# Patient Record
Sex: MALE | Race: WHITE | NOT HISPANIC OR LATINO | Employment: OTHER | URBAN - METROPOLITAN AREA
[De-identification: names, ages, dates, MRNs, and addresses within clinical notes are randomized per-mention and may not be internally consistent; named-entity substitution may affect disease eponyms.]

---

## 2023-04-22 ENCOUNTER — APPOINTMENT (OUTPATIENT)
Dept: CT IMAGING | Facility: CLINIC | Age: 73
DRG: 713 | End: 2023-04-22
Attending: EMERGENCY MEDICINE
Payer: MEDICARE

## 2023-04-22 ENCOUNTER — HOSPITAL ENCOUNTER (INPATIENT)
Facility: CLINIC | Age: 73
LOS: 6 days | Discharge: HOME | DRG: 713 | End: 2023-04-28
Attending: EMERGENCY MEDICINE | Admitting: STUDENT IN AN ORGANIZED HEALTH CARE EDUCATION/TRAINING PROGRAM
Payer: MEDICARE

## 2023-04-22 DIAGNOSIS — K59.00 CONSTIPATION, UNSPECIFIED CONSTIPATION TYPE: ICD-10-CM

## 2023-04-22 DIAGNOSIS — N39.0 COMPLICATED UTI (URINARY TRACT INFECTION): ICD-10-CM

## 2023-04-22 DIAGNOSIS — N10 PYELONEPHRITIS, ACUTE: ICD-10-CM

## 2023-04-22 DIAGNOSIS — R33.9 URINARY RETENTION: ICD-10-CM

## 2023-04-22 DIAGNOSIS — C61 MALIGNANT NEOPLASM OF PROSTATE (H): ICD-10-CM

## 2023-04-22 DIAGNOSIS — C61 PROSTATE CANCER (H): Primary | ICD-10-CM

## 2023-04-22 DIAGNOSIS — N17.9 AKI (ACUTE KIDNEY INJURY) (H): ICD-10-CM

## 2023-04-22 LAB
ALBUMIN UR-MCNC: ABNORMAL MG/DL
ANION GAP SERPL CALCULATED.3IONS-SCNC: 11 MMOL/L (ref 7–15)
APPEARANCE UR: ABNORMAL
BASOPHILS # BLD AUTO: 0 10E3/UL (ref 0–0.2)
BASOPHILS NFR BLD AUTO: 0 %
BILIRUB UR QL STRIP: NEGATIVE
BUN SERPL-MCNC: 25.9 MG/DL (ref 8–23)
CALCIUM SERPL-MCNC: 9.6 MG/DL (ref 8.8–10.2)
CHLORIDE SERPL-SCNC: 100 MMOL/L (ref 98–107)
COLOR UR AUTO: YELLOW
CREAT SERPL-MCNC: 2.03 MG/DL (ref 0.67–1.17)
DEPRECATED HCO3 PLAS-SCNC: 27 MMOL/L (ref 22–29)
EOSINOPHIL # BLD AUTO: 0 10E3/UL (ref 0–0.7)
EOSINOPHIL NFR BLD AUTO: 0 %
ERYTHROCYTE [DISTWIDTH] IN BLOOD BY AUTOMATED COUNT: 12 % (ref 10–15)
GFR SERPL CREATININE-BSD FRML MDRD: 34 ML/MIN/1.73M2
GLUCOSE SERPL-MCNC: 121 MG/DL (ref 70–99)
GLUCOSE UR STRIP-MCNC: NEGATIVE MG/DL
GRANULAR CAST: 6 /LPF
HCO3 BLDV-SCNC: 28 MMOL/L (ref 21–28)
HCT VFR BLD AUTO: 41 % (ref 40–53)
HGB BLD-MCNC: 14.1 G/DL (ref 13.3–17.7)
HGB UR QL STRIP: ABNORMAL
IMM GRANULOCYTES # BLD: 0.1 10E3/UL
IMM GRANULOCYTES NFR BLD: 0 %
KETONES UR STRIP-MCNC: NEGATIVE MG/DL
LACTATE BLD-SCNC: 2 MMOL/L
LEUKOCYTE ESTERASE UR QL STRIP: ABNORMAL
LYMPHOCYTES # BLD AUTO: 1.7 10E3/UL (ref 0.8–5.3)
LYMPHOCYTES NFR BLD AUTO: 9 %
MCH RBC QN AUTO: 31.1 PG (ref 26.5–33)
MCHC RBC AUTO-ENTMCNC: 34.4 G/DL (ref 31.5–36.5)
MCV RBC AUTO: 90 FL (ref 78–100)
MONOCYTES # BLD AUTO: 1.8 10E3/UL (ref 0–1.3)
MONOCYTES NFR BLD AUTO: 9 %
MUCOUS THREADS #/AREA URNS LPF: PRESENT /LPF
NEUTROPHILS # BLD AUTO: 15.4 10E3/UL (ref 1.6–8.3)
NEUTROPHILS NFR BLD AUTO: 82 %
NITRATE UR QL: NEGATIVE
NRBC # BLD AUTO: 0 10E3/UL
NRBC BLD AUTO-RTO: 0 /100
PCO2 BLDV: 50 MM HG (ref 40–50)
PH BLDV: 7.36 [PH] (ref 7.32–7.43)
PH UR STRIP: 6 [PH] (ref 5–7)
PLATELET # BLD AUTO: 286 10E3/UL (ref 150–450)
PO2 BLDV: 26 MM HG (ref 25–47)
POTASSIUM SERPL-SCNC: 4 MMOL/L (ref 3.4–5.3)
RBC # BLD AUTO: 4.54 10E6/UL (ref 4.4–5.9)
RBC URINE: >182 /HPF
SAO2 % BLDV: 44 % (ref 94–100)
SODIUM SERPL-SCNC: 138 MMOL/L (ref 136–145)
SP GR UR STRIP: 1.01 (ref 1–1.03)
UROBILINOGEN UR STRIP-MCNC: NORMAL MG/DL
WBC # BLD AUTO: 19 10E3/UL (ref 4–11)
WBC CLUMPS #/AREA URNS HPF: PRESENT /HPF
WBC URINE: 77 /HPF

## 2023-04-22 PROCEDURE — 36415 COLL VENOUS BLD VENIPUNCTURE: CPT | Performed by: EMERGENCY MEDICINE

## 2023-04-22 PROCEDURE — 250N000011 HC RX IP 250 OP 636: Performed by: EMERGENCY MEDICINE

## 2023-04-22 PROCEDURE — 85025 COMPLETE CBC W/AUTO DIFF WBC: CPT | Performed by: EMERGENCY MEDICINE

## 2023-04-22 PROCEDURE — 83605 ASSAY OF LACTIC ACID: CPT

## 2023-04-22 PROCEDURE — 120N000001 HC R&B MED SURG/OB

## 2023-04-22 PROCEDURE — 87086 URINE CULTURE/COLONY COUNT: CPT | Performed by: EMERGENCY MEDICINE

## 2023-04-22 PROCEDURE — 81001 URINALYSIS AUTO W/SCOPE: CPT | Performed by: EMERGENCY MEDICINE

## 2023-04-22 PROCEDURE — 82803 BLOOD GASES ANY COMBINATION: CPT

## 2023-04-22 PROCEDURE — 74176 CT ABD & PELVIS W/O CONTRAST: CPT | Mod: MA

## 2023-04-22 PROCEDURE — 258N000003 HC RX IP 258 OP 636: Performed by: STUDENT IN AN ORGANIZED HEALTH CARE EDUCATION/TRAINING PROGRAM

## 2023-04-22 PROCEDURE — 250N000013 HC RX MED GY IP 250 OP 250 PS 637: Performed by: STUDENT IN AN ORGANIZED HEALTH CARE EDUCATION/TRAINING PROGRAM

## 2023-04-22 PROCEDURE — 80048 BASIC METABOLIC PNL TOTAL CA: CPT | Performed by: EMERGENCY MEDICINE

## 2023-04-22 PROCEDURE — 99285 EMERGENCY DEPT VISIT HI MDM: CPT | Mod: 25

## 2023-04-22 PROCEDURE — 96374 THER/PROPH/DIAG INJ IV PUSH: CPT

## 2023-04-22 PROCEDURE — 99222 1ST HOSP IP/OBS MODERATE 55: CPT | Mod: AI | Performed by: STUDENT IN AN ORGANIZED HEALTH CARE EDUCATION/TRAINING PROGRAM

## 2023-04-22 RX ORDER — ONDANSETRON 2 MG/ML
4 INJECTION INTRAMUSCULAR; INTRAVENOUS EVERY 6 HOURS PRN
Status: DISCONTINUED | OUTPATIENT
Start: 2023-04-22 | End: 2023-04-23

## 2023-04-22 RX ORDER — CEFTRIAXONE 2 G/1
2 INJECTION, POWDER, FOR SOLUTION INTRAMUSCULAR; INTRAVENOUS EVERY 24 HOURS
Status: DISCONTINUED | OUTPATIENT
Start: 2023-04-23 | End: 2023-04-28

## 2023-04-22 RX ORDER — CEFTRIAXONE 1 G/1
1 INJECTION, POWDER, FOR SOLUTION INTRAMUSCULAR; INTRAVENOUS ONCE
Status: COMPLETED | OUTPATIENT
Start: 2023-04-22 | End: 2023-04-22

## 2023-04-22 RX ORDER — LIDOCAINE 40 MG/G
CREAM TOPICAL
Status: DISCONTINUED | OUTPATIENT
Start: 2023-04-22 | End: 2023-04-23

## 2023-04-22 RX ORDER — ONDANSETRON 4 MG/1
4 TABLET, ORALLY DISINTEGRATING ORAL EVERY 6 HOURS PRN
Status: DISCONTINUED | OUTPATIENT
Start: 2023-04-22 | End: 2023-04-23

## 2023-04-22 RX ORDER — TAMSULOSIN HYDROCHLORIDE 0.4 MG/1
0.4 CAPSULE ORAL DAILY
Status: DISCONTINUED | OUTPATIENT
Start: 2023-04-22 | End: 2023-04-28 | Stop reason: HOSPADM

## 2023-04-22 RX ORDER — ACETAMINOPHEN 650 MG/1
650 SUPPOSITORY RECTAL EVERY 6 HOURS PRN
Status: DISCONTINUED | OUTPATIENT
Start: 2023-04-22 | End: 2023-04-28 | Stop reason: HOSPADM

## 2023-04-22 RX ORDER — SODIUM CHLORIDE 9 MG/ML
INJECTION, SOLUTION INTRAVENOUS CONTINUOUS
Status: DISCONTINUED | OUTPATIENT
Start: 2023-04-22 | End: 2023-04-23

## 2023-04-22 RX ORDER — ACETAMINOPHEN 325 MG/1
650 TABLET ORAL EVERY 6 HOURS PRN
Status: DISCONTINUED | OUTPATIENT
Start: 2023-04-22 | End: 2023-04-28 | Stop reason: HOSPADM

## 2023-04-22 RX ADMIN — Medication 1 MG: at 21:50

## 2023-04-22 RX ADMIN — CEFTRIAXONE SODIUM 1 G: 1 INJECTION, POWDER, FOR SOLUTION INTRAMUSCULAR; INTRAVENOUS at 17:35

## 2023-04-22 RX ADMIN — ACETAMINOPHEN 650 MG: 325 TABLET ORAL at 21:47

## 2023-04-22 RX ADMIN — TAMSULOSIN HYDROCHLORIDE 0.4 MG: 0.4 CAPSULE ORAL at 23:04

## 2023-04-22 RX ADMIN — SODIUM CHLORIDE: 9 INJECTION, SOLUTION INTRAVENOUS at 21:43

## 2023-04-22 ASSESSMENT — ACTIVITIES OF DAILY LIVING (ADL)
ADLS_ACUITY_SCORE: 35
ADLS_ACUITY_SCORE: 35
ADLS_ACUITY_SCORE: 33
ADLS_ACUITY_SCORE: 35

## 2023-04-22 NOTE — PHARMACY-ADMISSION MEDICATION HISTORY
Pharmacist Admission Medication History    Admission medication history is complete. The information provided in this note is only as accurate as the sources available at the time of the update.    Medication reconciliation/reorder completed by provider prior to medication history? No    Information Source(s): Patient via in-person    Allergies reviewed with patient and updates made in EHR: yes    Medication History Completed By: Sona Carvalho 4/22/2023 5:31 PM    Prior to Admission medications    Medication Sig Last Dose Taking? Auth Provider Long Term End Date   NONFORMULARY Patient takes a mens herbal supplement daily which includes saw palmetto. Past Week Yes Unknown, Entered By History

## 2023-04-22 NOTE — ED NOTES
Patient still unable to provide urine sample. He is requesting catheter placement and follow up with Urology. MD notified.

## 2023-04-22 NOTE — ED PROVIDER NOTES
"  History     Chief Complaint:  Dysuria       The history is provided by the patient.      Don Venegas is a 72 year old male who presents with dysuria. Patient reports 2 days he ago he started to experience dysuria and urination frequency. He reports mild abdominal tenderness. Patient denies hematuria, fever, chills.  He does describe bilateral lower back pain.  Patient denies any past history of bladder infection.    Independent Historian:   None - Patient Only    Review of External Notes: none     ROS:  Review of Systems    Allergies:  No Known Allergies     Medications:    The patient is not currently taking any prescribed medications.     Past Medical History:    The patient denies any significant past medical history.     Social History:  The patient presents alone.  Arrived by private vehicle.         Physical Exam     Patient Vitals for the past 24 hrs:   BP Temp Temp src Pulse Resp SpO2 Height Weight   04/22/23 1213 (!) 160/64 97.1  F (36.2  C) Temporal 78 18 97 % 1.778 m (5' 10\") 68 kg (150 lb)        Physical Exam     Nursing note and vitals reviewed.    Constitutional:  Appears comfortable.    HENT:                    Nose normal.  No discharge.      Oral mucosa is moist.    GI:    Soft. mild suprapubic tenderness. No    guarding     No flank pain.    Neurological:   Alert and oriented. No focal weakness.  Skin:    Skin is warm and dry. No rash noted.    No diaphoresis.      No erythema. No pallor.  No lesions.  Psychiatric:   Behavior is normal. Appropriate mood    and affect.     Judgment and thought content normal.     Emergency Department Course       Laboratory:  Labs Ordered and Resulted from Time of ED Arrival to Time of ED Departure   ROUTINE UA WITH MICROSCOPIC REFLEX TO CULTURE - Abnormal       Result Value    Color Urine Yellow      Appearance Urine Cloudy (*)     Glucose Urine Negative      Bilirubin Urine Negative      Ketones Urine Negative      Specific Gravity Urine 1.015      Blood " Urine Large (*)     pH Urine 6.0      Protein Albumin Urine Trace (*)     Urobilinogen Urine Normal      Nitrite Urine Negative      Leukocyte Esterase Urine Trace (*)     WBC Clumps Urine Present (*)     Mucus Urine Present (*)     RBC Urine >182 (*)     WBC Urine 77 (*)     Granular Casts Urine 6 (*)    BASIC METABOLIC PANEL - Abnormal    Sodium 138      Potassium 4.0      Chloride 100      Carbon Dioxide (CO2) 27      Anion Gap 11      Urea Nitrogen 25.9 (*)     Creatinine 2.03 (*)     Calcium 9.6      Glucose 121 (*)     GFR Estimate 34 (*)    CBC WITH PLATELETS AND DIFFERENTIAL - Abnormal    WBC Count 19.0 (*)     RBC Count 4.54      Hemoglobin 14.1      Hematocrit 41.0      MCV 90      MCH 31.1      MCHC 34.4      RDW 12.0      Platelet Count 286      % Neutrophils 82      % Lymphocytes 9      % Monocytes 9      % Eosinophils 0      % Basophils 0      % Immature Granulocytes 0      NRBCs per 100 WBC 0      Absolute Neutrophils 15.4 (*)     Absolute Lymphocytes 1.7      Absolute Monocytes 1.8 (*)     Absolute Eosinophils 0.0      Absolute Basophils 0.0      Absolute Immature Granulocytes 0.1      Absolute NRBCs 0.0     LACTIC ACID WHOLE BLOOD   URINE CULTURE          Emergency Department Course & Assessments:           Interventions:  Medications   cefTRIAXone (ROCEPHIN) 1 g vial to attach to  mL bag for ADULTS or NS 50 mL bag for PEDS (1 g Intravenous $New Bag 4/22/23 173)        Assessments:  1224 I obtained history and examined the patient as noted above.   1649 I rechecked and updated the patient.     Independent Interpretation (X-rays, CTs, rhythm strip):  None    Consultations/Discussion of Management or Tests: I spoke with Dr. Loredo, Hospitalist, regarding the patient's history and presentation in the emergency department today.           Social Determinants of Health affecting care:   None    Disposition:  The patient was admitted to the hospital under the care of Dr. Loredo.     Impression & Plan       Medical Decision Making:  Patient comes in with dysuria.  He could not urinate while in the emergency room.  A bladder scan was obtained he had over 1000 cc in this we placed a May catheter and relieved his obstruction and a large volume of urine came back and it is infected.  His white blood cell count is 19,000, urine is cultured, he has acute renal failure with a creatinine of 2.03 and GFR of 34 but normal electrolytes.  He has no history of renal failure.  He does not usually go to the doctor.  Rocephin is ordered 1 g IV and he will continue to be drained with the May catheter.  I talked with Dr. Loredo who will be the admitting hospitalist and he recommended a CT scan without contrast and the patient will be admitted for further antibiotics and urology consultation.  Dr. Loredo will follow-up the CT report.  .    Diagnosis:    ICD-10-CM    1. Pyelonephritis, acute  N10       2. SHANON (acute kidney injury) (H)  N17.9       3. Urinary retention  R33.9            Scribe Disclosure:  MARISELA LANGSTON PRINCESS, am serving as a scribe at 12:45 PM on 4/22/2023 to document services personally performed by Micaela Sotelo MD based on my observations and the provider's statements to me.      4/22/2023   Micaela Sotelo MD Powell, Tracy Alan, MD  04/22/23 2750

## 2023-04-22 NOTE — ED TRIAGE NOTES
Painful urination started on Thursday. Frequency started on Friday. Last evening pain worsened in penis area.      Triage Assessment     Row Name 04/22/23 1214       Triage Assessment (Adult)    Airway WDL WDL       Respiratory WDL    Respiratory WDL WDL       Skin Circulation/Temperature WDL    Skin Circulation/Temperature WDL WDL       Cardiac WDL    Cardiac WDL WDL       Peripheral/Neurovascular WDL    Peripheral Neurovascular WDL WDL       Cognitive/Neuro/Behavioral WDL    Cognitive/Neuro/Behavioral WDL WDL

## 2023-04-22 NOTE — ED NOTES
"[facility]  ED Nurse Handoff Report    ED Chief complaint: Dysuria      ED Diagnosis:   Final diagnoses:   Pyelonephritis, acute   SHANON (acute kidney injury) (H)   Urinary retention       Code Status: Full Code    Allergies:   Allergies   Allergen Reactions    Gluten Meal        Patient Story: PT comes in through triage with dysuria since Thursday.  Focused Assessment: A/Ox4 can make needs known. RA sats 98%. May placed for retension, return of 1000 ml. Denies pain at this time.     Treatments and/or interventions provided: Iv, labs, CT pending, IV abx.  Patient's response to treatments and/or interventions: Tolerated well.    To be done/followed up on inpatient unit:  Monitor.    Does this patient have any cognitive concerns?: None    Activity level - Baseline/Home:  Independent  Activity Level - Current:   Independent    Patient's Preferred language: English   Needed?: No    Isolation: None  Infection: Not Applicable  Patient tested for COVID 19 prior to admission: NO  Bariatric?: No    Vital Signs:   Vitals:    04/22/23 1213 04/22/23 1722   BP: (!) 160/64 (!) 157/77   Pulse: 78 74   Resp: 18    Temp: 97.1  F (36.2  C)    TempSrc: Temporal    SpO2: 97% 100%   Weight: 68 kg (150 lb)    Height: 1.778 m (5' 10\")        Cardiac Rhythm:     Was the PSS-3 completed:   Yes  What interventions are required if any?               Family Comments: None  OBS brochure/video discussed/provided to patient/family: N/A              Name of person given brochure if not patient: NA              Relationship to patient: NA    For the majority of the shift this patient's behavior was Green.   Behavioral interventions performed were None.    ED NURSE PHONE NUMBER: [phone]         "

## 2023-04-23 ENCOUNTER — ANESTHESIA EVENT (OUTPATIENT)
Dept: SURGERY | Facility: CLINIC | Age: 73
DRG: 713 | End: 2023-04-23
Payer: MEDICARE

## 2023-04-23 ENCOUNTER — ANESTHESIA (OUTPATIENT)
Dept: SURGERY | Facility: CLINIC | Age: 73
DRG: 713 | End: 2023-04-23
Payer: MEDICARE

## 2023-04-23 LAB
ALBUMIN SERPL BCG-MCNC: 3.4 G/DL (ref 3.5–5.2)
ALP SERPL-CCNC: 90 U/L (ref 40–129)
ALT SERPL W P-5'-P-CCNC: 14 U/L (ref 10–50)
ANION GAP SERPL CALCULATED.3IONS-SCNC: 10 MMOL/L (ref 7–15)
ANION GAP SERPL CALCULATED.3IONS-SCNC: 14 MMOL/L (ref 7–15)
AST SERPL W P-5'-P-CCNC: 28 U/L (ref 10–50)
BILIRUB SERPL-MCNC: 1 MG/DL
BUN SERPL-MCNC: 24 MG/DL (ref 8–23)
BUN SERPL-MCNC: 24.2 MG/DL (ref 8–23)
CALCIUM SERPL-MCNC: 8.5 MG/DL (ref 8.8–10.2)
CALCIUM SERPL-MCNC: 8.6 MG/DL (ref 8.8–10.2)
CHLORIDE SERPL-SCNC: 105 MMOL/L (ref 98–107)
CHLORIDE SERPL-SCNC: 105 MMOL/L (ref 98–107)
CREAT SERPL-MCNC: 1.46 MG/DL (ref 0.67–1.17)
CREAT SERPL-MCNC: 1.51 MG/DL (ref 0.67–1.17)
DEPRECATED HCO3 PLAS-SCNC: 21 MMOL/L (ref 22–29)
DEPRECATED HCO3 PLAS-SCNC: 24 MMOL/L (ref 22–29)
ERYTHROCYTE [DISTWIDTH] IN BLOOD BY AUTOMATED COUNT: 12 % (ref 10–15)
GFR SERPL CREATININE-BSD FRML MDRD: 49 ML/MIN/1.73M2
GFR SERPL CREATININE-BSD FRML MDRD: 51 ML/MIN/1.73M2
GLUCOSE SERPL-MCNC: 107 MG/DL (ref 70–99)
GLUCOSE SERPL-MCNC: 115 MG/DL (ref 70–99)
HCT VFR BLD AUTO: 36.3 % (ref 40–53)
HGB BLD-MCNC: 12.7 G/DL (ref 13.3–17.7)
INR PPP: 1.18 (ref 0.85–1.15)
MCH RBC QN AUTO: 31.7 PG (ref 26.5–33)
MCHC RBC AUTO-ENTMCNC: 35 G/DL (ref 31.5–36.5)
MCV RBC AUTO: 91 FL (ref 78–100)
PLATELET # BLD AUTO: 228 10E3/UL (ref 150–450)
POTASSIUM SERPL-SCNC: 3.7 MMOL/L (ref 3.4–5.3)
POTASSIUM SERPL-SCNC: 3.9 MMOL/L (ref 3.4–5.3)
PROT SERPL-MCNC: 5.7 G/DL (ref 6.4–8.3)
RBC # BLD AUTO: 4.01 10E6/UL (ref 4.4–5.9)
SODIUM SERPL-SCNC: 139 MMOL/L (ref 136–145)
SODIUM SERPL-SCNC: 140 MMOL/L (ref 136–145)
WBC # BLD AUTO: 14.6 10E3/UL (ref 4–11)

## 2023-04-23 PROCEDURE — 36415 COLL VENOUS BLD VENIPUNCTURE: CPT | Performed by: UROLOGY

## 2023-04-23 PROCEDURE — 250N000011 HC RX IP 250 OP 636: Performed by: UROLOGY

## 2023-04-23 PROCEDURE — 250N000009 HC RX 250: Performed by: UROLOGY

## 2023-04-23 PROCEDURE — 99232 SBSQ HOSP IP/OBS MODERATE 35: CPT | Performed by: HOSPITALIST

## 2023-04-23 PROCEDURE — 272N000001 HC OR GENERAL SUPPLY STERILE: Performed by: UROLOGY

## 2023-04-23 PROCEDURE — 250N000013 HC RX MED GY IP 250 OP 250 PS 637: Performed by: STUDENT IN AN ORGANIZED HEALTH CARE EDUCATION/TRAINING PROGRAM

## 2023-04-23 PROCEDURE — 80053 COMPREHEN METABOLIC PANEL: CPT | Performed by: STUDENT IN AN ORGANIZED HEALTH CARE EDUCATION/TRAINING PROGRAM

## 2023-04-23 PROCEDURE — 710N000009 HC RECOVERY PHASE 1, LEVEL 1, PER MIN: Performed by: UROLOGY

## 2023-04-23 PROCEDURE — 88307 TISSUE EXAM BY PATHOLOGIST: CPT | Mod: TC | Performed by: UROLOGY

## 2023-04-23 PROCEDURE — 85610 PROTHROMBIN TIME: CPT | Performed by: UROLOGY

## 2023-04-23 PROCEDURE — 370N000017 HC ANESTHESIA TECHNICAL FEE, PER MIN: Performed by: UROLOGY

## 2023-04-23 PROCEDURE — 258N000001 HC RX 258: Performed by: UROLOGY

## 2023-04-23 PROCEDURE — 250N000013 HC RX MED GY IP 250 OP 250 PS 637: Performed by: UROLOGY

## 2023-04-23 PROCEDURE — 360N000076 HC SURGERY LEVEL 3, PER MIN: Performed by: UROLOGY

## 2023-04-23 PROCEDURE — 258N000003 HC RX IP 258 OP 636: Performed by: UROLOGY

## 2023-04-23 PROCEDURE — 0VB08ZZ EXCISION OF PROSTATE, VIA NATURAL OR ARTIFICIAL OPENING ENDOSCOPIC: ICD-10-PCS | Performed by: UROLOGY

## 2023-04-23 PROCEDURE — 85027 COMPLETE CBC AUTOMATED: CPT | Performed by: STUDENT IN AN ORGANIZED HEALTH CARE EDUCATION/TRAINING PROGRAM

## 2023-04-23 PROCEDURE — 250N000013 HC RX MED GY IP 250 OP 250 PS 637: Performed by: HOSPITALIST

## 2023-04-23 PROCEDURE — 250N000009 HC RX 250: Performed by: NURSE ANESTHETIST, CERTIFIED REGISTERED

## 2023-04-23 PROCEDURE — 250N000011 HC RX IP 250 OP 636: Performed by: STUDENT IN AN ORGANIZED HEALTH CARE EDUCATION/TRAINING PROGRAM

## 2023-04-23 PROCEDURE — 999N000141 HC STATISTIC PRE-PROCEDURE NURSING ASSESSMENT: Performed by: UROLOGY

## 2023-04-23 PROCEDURE — 258N000003 HC RX IP 258 OP 636: Performed by: NURSE ANESTHETIST, CERTIFIED REGISTERED

## 2023-04-23 PROCEDURE — 250N000011 HC RX IP 250 OP 636: Performed by: NURSE ANESTHETIST, CERTIFIED REGISTERED

## 2023-04-23 PROCEDURE — 250N000025 HC SEVOFLURANE, PER MIN: Performed by: UROLOGY

## 2023-04-23 PROCEDURE — C1769 GUIDE WIRE: HCPCS | Performed by: UROLOGY

## 2023-04-23 PROCEDURE — 120N000001 HC R&B MED SURG/OB

## 2023-04-23 PROCEDURE — 36415 COLL VENOUS BLD VENIPUNCTURE: CPT | Performed by: STUDENT IN AN ORGANIZED HEALTH CARE EDUCATION/TRAINING PROGRAM

## 2023-04-23 PROCEDURE — 258N000003 HC RX IP 258 OP 636: Performed by: STUDENT IN AN ORGANIZED HEALTH CARE EDUCATION/TRAINING PROGRAM

## 2023-04-23 RX ORDER — SODIUM CHLORIDE, SODIUM LACTATE, POTASSIUM CHLORIDE, CALCIUM CHLORIDE 600; 310; 30; 20 MG/100ML; MG/100ML; MG/100ML; MG/100ML
INJECTION, SOLUTION INTRAVENOUS CONTINUOUS
Status: DISCONTINUED | OUTPATIENT
Start: 2023-04-23 | End: 2023-04-23 | Stop reason: HOSPADM

## 2023-04-23 RX ORDER — FENTANYL CITRATE 50 UG/ML
25 INJECTION, SOLUTION INTRAMUSCULAR; INTRAVENOUS EVERY 5 MIN PRN
Status: DISCONTINUED | OUTPATIENT
Start: 2023-04-23 | End: 2023-04-23 | Stop reason: HOSPADM

## 2023-04-23 RX ORDER — SODIUM CHLORIDE, SODIUM LACTATE, POTASSIUM CHLORIDE, CALCIUM CHLORIDE 600; 310; 30; 20 MG/100ML; MG/100ML; MG/100ML; MG/100ML
INJECTION, SOLUTION INTRAVENOUS CONTINUOUS PRN
Status: DISCONTINUED | OUTPATIENT
Start: 2023-04-23 | End: 2023-04-23

## 2023-04-23 RX ORDER — FENTANYL CITRATE 50 UG/ML
INJECTION, SOLUTION INTRAMUSCULAR; INTRAVENOUS PRN
Status: DISCONTINUED | OUTPATIENT
Start: 2023-04-23 | End: 2023-04-23

## 2023-04-23 RX ORDER — SODIUM CHLORIDE 9 MG/ML
INJECTION, SOLUTION INTRAVENOUS CONTINUOUS
Status: DISCONTINUED | OUTPATIENT
Start: 2023-04-23 | End: 2023-04-28 | Stop reason: HOSPADM

## 2023-04-23 RX ORDER — DEXAMETHASONE SODIUM PHOSPHATE 4 MG/ML
INJECTION, SOLUTION INTRA-ARTICULAR; INTRALESIONAL; INTRAMUSCULAR; INTRAVENOUS; SOFT TISSUE PRN
Status: DISCONTINUED | OUTPATIENT
Start: 2023-04-23 | End: 2023-04-23

## 2023-04-23 RX ORDER — NALOXONE HYDROCHLORIDE 0.4 MG/ML
0.2 INJECTION, SOLUTION INTRAMUSCULAR; INTRAVENOUS; SUBCUTANEOUS
Status: DISCONTINUED | OUTPATIENT
Start: 2023-04-23 | End: 2023-04-28 | Stop reason: HOSPADM

## 2023-04-23 RX ORDER — LIDOCAINE 40 MG/G
CREAM TOPICAL
Status: DISCONTINUED | OUTPATIENT
Start: 2023-04-23 | End: 2023-04-28 | Stop reason: HOSPADM

## 2023-04-23 RX ORDER — ONDANSETRON 4 MG/1
4 TABLET, ORALLY DISINTEGRATING ORAL EVERY 30 MIN PRN
Status: DISCONTINUED | OUTPATIENT
Start: 2023-04-23 | End: 2023-04-23 | Stop reason: HOSPADM

## 2023-04-23 RX ORDER — OXYCODONE HYDROCHLORIDE 5 MG/1
5 TABLET ORAL EVERY 4 HOURS PRN
Status: DISCONTINUED | OUTPATIENT
Start: 2023-04-23 | End: 2023-04-23

## 2023-04-23 RX ORDER — HYDROMORPHONE HCL IN WATER/PF 6 MG/30 ML
0.4 PATIENT CONTROLLED ANALGESIA SYRINGE INTRAVENOUS EVERY 5 MIN PRN
Status: DISCONTINUED | OUTPATIENT
Start: 2023-04-23 | End: 2023-04-23 | Stop reason: HOSPADM

## 2023-04-23 RX ORDER — CEFAZOLIN SODIUM 1 G/3ML
1 INJECTION, POWDER, FOR SOLUTION INTRAMUSCULAR; INTRAVENOUS EVERY 8 HOURS
Status: DISCONTINUED | OUTPATIENT
Start: 2023-04-23 | End: 2023-04-23

## 2023-04-23 RX ORDER — OXYCODONE HYDROCHLORIDE 5 MG/1
5 TABLET ORAL EVERY 4 HOURS PRN
Status: DISCONTINUED | OUTPATIENT
Start: 2023-04-23 | End: 2023-04-28 | Stop reason: HOSPADM

## 2023-04-23 RX ORDER — ACETAMINOPHEN 325 MG/1
650 TABLET ORAL EVERY 4 HOURS PRN
Qty: 100 TABLET | Refills: 0 | Status: SHIPPED | OUTPATIENT
Start: 2023-04-23 | End: 2023-04-28

## 2023-04-23 RX ORDER — PROPOFOL 10 MG/ML
INJECTION, EMULSION INTRAVENOUS PRN
Status: DISCONTINUED | OUTPATIENT
Start: 2023-04-23 | End: 2023-04-23

## 2023-04-23 RX ORDER — ONDANSETRON 2 MG/ML
4 INJECTION INTRAMUSCULAR; INTRAVENOUS EVERY 6 HOURS PRN
Status: DISCONTINUED | OUTPATIENT
Start: 2023-04-23 | End: 2023-04-28 | Stop reason: HOSPADM

## 2023-04-23 RX ORDER — ONDANSETRON 4 MG/1
4-8 TABLET, ORALLY DISINTEGRATING ORAL EVERY 8 HOURS PRN
Qty: 10 TABLET | Refills: 0 | Status: SHIPPED | OUTPATIENT
Start: 2023-04-23 | End: 2023-04-28

## 2023-04-23 RX ORDER — HYDROMORPHONE HCL IN WATER/PF 6 MG/30 ML
0.2 PATIENT CONTROLLED ANALGESIA SYRINGE INTRAVENOUS EVERY 5 MIN PRN
Status: DISCONTINUED | OUTPATIENT
Start: 2023-04-23 | End: 2023-04-23 | Stop reason: HOSPADM

## 2023-04-23 RX ORDER — LIDOCAINE HYDROCHLORIDE 20 MG/ML
INJECTION, SOLUTION INFILTRATION; PERINEURAL PRN
Status: DISCONTINUED | OUTPATIENT
Start: 2023-04-23 | End: 2023-04-23

## 2023-04-23 RX ORDER — ONDANSETRON 2 MG/ML
4 INJECTION INTRAMUSCULAR; INTRAVENOUS EVERY 30 MIN PRN
Status: DISCONTINUED | OUTPATIENT
Start: 2023-04-23 | End: 2023-04-23 | Stop reason: HOSPADM

## 2023-04-23 RX ORDER — FENTANYL CITRATE 50 UG/ML
50 INJECTION, SOLUTION INTRAMUSCULAR; INTRAVENOUS EVERY 5 MIN PRN
Status: DISCONTINUED | OUTPATIENT
Start: 2023-04-23 | End: 2023-04-23 | Stop reason: HOSPADM

## 2023-04-23 RX ORDER — LIDOCAINE 40 MG/G
CREAM TOPICAL
Status: DISCONTINUED | OUTPATIENT
Start: 2023-04-23 | End: 2023-04-23

## 2023-04-23 RX ORDER — HYDROXYZINE HYDROCHLORIDE 10 MG/1
10 TABLET, FILM COATED ORAL EVERY 6 HOURS PRN
Qty: 30 TABLET | Refills: 0 | Status: SHIPPED | OUTPATIENT
Start: 2023-04-23 | End: 2023-04-28

## 2023-04-23 RX ORDER — ONDANSETRON 4 MG/1
4 TABLET, ORALLY DISINTEGRATING ORAL EVERY 6 HOURS PRN
Status: DISCONTINUED | OUTPATIENT
Start: 2023-04-23 | End: 2023-04-28 | Stop reason: HOSPADM

## 2023-04-23 RX ORDER — CEFAZOLIN SODIUM 2 G/100ML
2 INJECTION, SOLUTION INTRAVENOUS
Status: COMPLETED | OUTPATIENT
Start: 2023-04-23 | End: 2023-04-23

## 2023-04-23 RX ORDER — NALOXONE HYDROCHLORIDE 0.4 MG/ML
0.4 INJECTION, SOLUTION INTRAMUSCULAR; INTRAVENOUS; SUBCUTANEOUS
Status: DISCONTINUED | OUTPATIENT
Start: 2023-04-23 | End: 2023-04-28 | Stop reason: HOSPADM

## 2023-04-23 RX ORDER — EPHEDRINE SULFATE 50 MG/ML
INJECTION, SOLUTION INTRAMUSCULAR; INTRAVENOUS; SUBCUTANEOUS PRN
Status: DISCONTINUED | OUTPATIENT
Start: 2023-04-23 | End: 2023-04-23

## 2023-04-23 RX ADMIN — SODIUM CHLORIDE: 9 INJECTION, SOLUTION INTRAVENOUS at 06:44

## 2023-04-23 RX ADMIN — Medication 5 MG: at 17:26

## 2023-04-23 RX ADMIN — LIDOCAINE HYDROCHLORIDE 100 MG: 20 INJECTION, SOLUTION INFILTRATION; PERINEURAL at 17:19

## 2023-04-23 RX ADMIN — FENTANYL CITRATE 50 MCG: 50 INJECTION, SOLUTION INTRAMUSCULAR; INTRAVENOUS at 17:41

## 2023-04-23 RX ADMIN — Medication 10 MG: at 17:19

## 2023-04-23 RX ADMIN — SODIUM CHLORIDE, POTASSIUM CHLORIDE, SODIUM LACTATE AND CALCIUM CHLORIDE: 600; 310; 30; 20 INJECTION, SOLUTION INTRAVENOUS at 17:15

## 2023-04-23 RX ADMIN — PHENYLEPHRINE HYDROCHLORIDE 200 MCG: 10 INJECTION INTRAVENOUS at 17:22

## 2023-04-23 RX ADMIN — HYDROMORPHONE HYDROCHLORIDE 0.5 MG: 1 INJECTION, SOLUTION INTRAMUSCULAR; INTRAVENOUS; SUBCUTANEOUS at 17:50

## 2023-04-23 RX ADMIN — ACETAMINOPHEN 650 MG: 325 TABLET ORAL at 05:56

## 2023-04-23 RX ADMIN — OXYCODONE HYDROCHLORIDE 5 MG: 5 TABLET ORAL at 10:17

## 2023-04-23 RX ADMIN — OXYCODONE HYDROCHLORIDE 5 MG: 5 TABLET ORAL at 14:13

## 2023-04-23 RX ADMIN — DEXAMETHASONE SODIUM PHOSPHATE 4 MG: 4 INJECTION, SOLUTION INTRA-ARTICULAR; INTRALESIONAL; INTRAMUSCULAR; INTRAVENOUS; SOFT TISSUE at 17:39

## 2023-04-23 RX ADMIN — SODIUM CHLORIDE: 9 INJECTION, SOLUTION INTRAVENOUS at 20:08

## 2023-04-23 RX ADMIN — Medication 10 MG: at 17:21

## 2023-04-23 RX ADMIN — PROPOFOL 200 MG: 10 INJECTION, EMULSION INTRAVENOUS at 17:19

## 2023-04-23 RX ADMIN — PHENYLEPHRINE HYDROCHLORIDE 200 MCG: 10 INJECTION INTRAVENOUS at 17:28

## 2023-04-23 RX ADMIN — CEFAZOLIN SODIUM 2 G: 2 INJECTION, SOLUTION INTRAVENOUS at 17:15

## 2023-04-23 RX ADMIN — HYOSCYAMINE SULFATE 125 MCG: 0.12 TABLET SUBLINGUAL at 18:50

## 2023-04-23 RX ADMIN — ONDANSETRON 4 MG: 2 INJECTION INTRAMUSCULAR; INTRAVENOUS at 17:38

## 2023-04-23 RX ADMIN — PHENYLEPHRINE HYDROCHLORIDE 200 MCG: 10 INJECTION INTRAVENOUS at 17:26

## 2023-04-23 RX ADMIN — PHENYLEPHRINE HYDROCHLORIDE 200 MCG: 10 INJECTION INTRAVENOUS at 17:24

## 2023-04-23 RX ADMIN — FENTANYL CITRATE 50 MCG: 50 INJECTION, SOLUTION INTRAMUSCULAR; INTRAVENOUS at 17:19

## 2023-04-23 RX ADMIN — TAMSULOSIN HYDROCHLORIDE 0.4 MG: 0.4 CAPSULE ORAL at 09:19

## 2023-04-23 ASSESSMENT — ACTIVITIES OF DAILY LIVING (ADL)
ADLS_ACUITY_SCORE: 36

## 2023-04-23 NOTE — PLAN OF CARE
Goal Outcome Evaluation:    DX: SHANON, Urinary Retention       Received pt on Unit at 2100. Alert and oriented X4, in no distress. VSS, clear lungs sound bilaterally. Complained of right lower back pain upon arrival, controlled with PRN tylenol X2. Transfer/mobility independent w/SBA. IV NS at 100ml/hr, laughlin in place for urinary retention, patent.  On IV ABX (ROCEPHIN) Q24hr, with initial dose administered in ED. Expected discharge pending 4/24

## 2023-04-23 NOTE — PLAN OF CARE
Goal Outcome Evaluation:      Plan of Care Reviewed With: patient    Overall Patient Progress: no changeOverall Patient Progress: no change     A&O x 4. VSS, RA. CMS intact. Pain managed with oxy. Up SBA. May patent and intact, will be removed in OR. NPO since 7:45 am, OR today.

## 2023-04-23 NOTE — H&P
[facility]    History and Physical - Hospitalist Service       Date of Admission:  4/22/2023    Assessment & Plan      Don Venegas is a 72 year old male admitted on 4/22/2023.         #Pyelonephritis  #Nephrolithiasis  CT scan of the abdomen  Right hydronephrosis/hydroureter with a tiny calcification near the right UVJ and this is favored to represent a small UVJ stone.  2.  Retroperitoneal edema and fluid surrounding right kidney suggests calyceal fornix rupture.  -Consulted urology discussed, with on-call urologist and to continue IV fluids and antibiotics and no surgical intervention planned urology will see patient in the morning  -Continue iv fluids  -Continue May care  -Continue Rocephin  -Follow culture        # SHANON  #Urinary retention    Patient SHANON is most likely due to postrenal.  Has been having symptoms of difficulty voiding since 1 year.  CT scan shows right-sided hydronephrosis and hydroureter  May catheter placed in the ER   Continue IV fluids   Recheck BMP in the morning   Follow urology recommendations        # BPH ?  Will start him on Tamsulosin        Diet: Combination Diet Regular Diet Adult  DVT Prophylaxis: Pneumatic Compression Devices  May Catheter: Not present  Lines: None     Cardiac Monitoring: None  Code Status: Full Code                             Disposition Plan      Expected Discharge Date: 04/24/2023                  Italia Loredo MD  Hospitalist Service  [facility]  Securely message with [employer] (more info)  Text page via [employer] Paging/Directory     ______________________________________________________________________    Chief Complaint       History is obtained from the patient    History of Present Illness   Don Venegas is a 72 year old male who   Patient does not have any significant past medical history.  He has been having difficulty voiding since 1 year.  Since the last 3 to 4 days has been having  flank pain and dysuria.  Denies any fever or chills.  Denies hematuria.  Has been having difficulty with his stream.  Denies any abdominal pain.  Denies any bleeding in the stools.  Denies any shortness of breath or chest pain.      Past Medical History    No past medical history on file.    Past Surgical History   No past surgical history on file.    Prior to Admission Medications   Prior to Admission Medications   Prescriptions Last Dose Informant Patient Reported? Taking?   NONFORMULARY Past Week  Yes Yes   Sig: Patient takes a mens herbal supplement daily which includes saw palmetto.      Facility-Administered Medications: None           Physical Exam   Vital Signs: Temp: 99.3  F (37.4  C) Temp src: Oral BP: (!) 156/74 Pulse: 75   Resp: 16 SpO2: 99 % O2 Device: None (Room air)    Weight: 150 lbs 0 oz  Physical Exam  Cardiovascular:      Rate and Rhythm: Normal rate and regular rhythm.      Heart sounds: Normal heart sounds. No murmur heard.     No gallop.   Pulmonary:      Effort: Pulmonary effort is normal. No respiratory distress.      Breath sounds: No wheezing or rales.   Abdominal:      General: There is no distension.      Palpations: Abdomen is soft.      Tenderness: There is no abdominal tenderness. There is no right CVA tenderness or left CVA tenderness.   Musculoskeletal:      Right lower leg: No edema.      Left lower leg: No edema.       Medical Decision Making       Data     I have personally reviewed the following data over the past 24 hrs:    19.0 (H)  \   14.1   / 286     138 100 25.9 (H) /  121 (H)   4.0 27 2.03 (H) \       Procal: N/A CRP: N/A Lactic Acid: 2.0         Imaging results reviewed over the past 24 hrs:   Recent Results (from the past 24 hour(s))   CT Abdomen Pelvis w/o Contrast    Narrative    EXAM: CT ABDOMEN PELVIS W/O CONTRAST  LOCATION: [facility]  DATE/TIME: 4/22/2023 6:49 PM CDT    INDICATION: Patient has acute urinary retention with acute renal  failure and pyelonephritis.  COMPARISON: None.  TECHNIQUE: CT scan of the abdomen and pelvis was performed without IV contrast. Multiplanar reformats were obtained. Dose reduction techniques were used.  CONTRAST: None.    FINDINGS:   LOWER CHEST: Normal.    HEPATOBILIARY: Normal.    PANCREAS: Normal.    SPLEEN: Normal.    ADRENAL GLANDS: Normal.    KIDNEYS/BLADDER: Moderate right hydronephrosis and hydroureter. There is a 2 mm calcification in the region of the UPJ on series 4 image 163 which is favored to represent a UPJ stone. There is abundant soft tissue stranding and modest retroperitoneal   fluid adjacent to right kidney suggesting calyceal fornix rupture.    Left kidney within normal limits.  May catheter present with bladder empty.    BOWEL: No obstruction or inflammatory change. Appendix normal.    LYMPH NODES: There are several mildly prominent periaortic nodes, largest is aortocaval node measuring 2 x 1.2 cm on image 71    VASCULATURE: Unremarkable.    PELVIC ORGANS: Normal.    MUSCULOSKELETAL: Small bilateral inguinal hernias right side contains a short loop of nonobstructed small bowel.      Impression    IMPRESSION:   1.  Right hydronephrosis/hydroureter with a tiny calcification near the right UVJ and this is favored to represent a small UVJ stone.  2.  Retroperitoneal edema and fluid surrounding right kidney suggests calyceal fornix rupture.

## 2023-04-23 NOTE — BRIEF OP NOTE
[facility] Brief Operative Note    Pre-operative diagnosis: Kidney stone [N20.0]   Post-operative diagnosis Ureteral obstruction from median lobe of prostate    Procedure: Procedure(s):  CYSTOSCOPY, LIMITED TRANSURETHRAL RESECTION OF PROSTATE   Surgeon: Matthias Cuellar MD   Assistants(s): none   Estimated blood loss: Less than 50 ml    Specimens: Alisa   Findings: Unable to see UOS micaela; limited resection of median lobe of prostate in attempt to see UOs; a lot of inflammation    Plan: he needs right PCN next and follow-up on creatinine; CBI till clear; keep laughlin for 1-2 weeks

## 2023-04-23 NOTE — PROGRESS NOTES
[facility]    Medicine Progress Note - Hospitalist Service    Date of Admission:  4/22/2023    Assessment & Plan   Don Venegas is a 72 year old male admitted on 4/22/2023.            Pyelonephritis  Nephrolithiasis  CT scan of the abdomen  Right hydronephrosis/hydroureter with a tiny calcification near the right UVJ and this is favored to represent a small UVJ stone.  Retroperitoneal edema and fluid surrounding right kidney suggests calyceal fornix rupture.  - Consulted urology - unclear timing of possible cystoscopy and intervention.  - Continue iv fluids  - Continue May care  - Continue Rocephin  - Follow culture  - prn analgesia now in place    SHANON - improving  Urinary retention  Patient SHANON is most likely due to postrenal.  Has been having symptoms of difficulty voiding since 1 year.  CT scan shows right-sided hydronephrosis and hydroureter  May catheter placed in the ER  - Continue IV fluids  - creat 1.5 today, 2.0 on adm  - Follow urology recommendations     BPH, possible  Will start him on Tamsulosin          Diet: NPO for Medical/Clinical Reasons Except for: Meds    DVT Prophylaxis: Pneumatic Compression Devices  May Catheter: PRESENT, indication:    Lines: None     Cardiac Monitoring: None  Code Status: Full Code      Clinically Significant Risk Factors Present on Admission              # Hypoalbuminemia: Lowest albumin = 3.4 g/dL at 4/23/2023  6:53 AM, will monitor as appropriate                  Disposition Plan      Expected Discharge Date: 04/24/2023                  Javier Loja MD  Hospitalist Service  [facility]  Securely message with [employer] (more info)  Text page via [employer] Paging/Directory   ______________________________________________________________________    Interval History   Seen and examined. No new issues, still some R flank/back pain. Improved with analgesia. No sob, f/c.    Physical Exam   Vital Signs: Temp: 97.8  F (36.6   C) Temp src: Oral BP: 124/60 Pulse: 76   Resp: 16 SpO2: (P) 97 % O2 Device: (P) None (Room air)    Weight: 150 lbs 0 oz    Gen: NAD, pleasant  HEENT: EOMI, MMM  Resp: no focal crackles, no wheezes, no increased work of resp  CV: S1S2 heard, reg rhythm, reg rate  Abdo: soft, nontender, nondistended, bowel sounds present  Ext: calves nontender, well perfused  Neuro: aa, conversant, moving ext, CN grossly intact, no facial asymmetry      Medical Decision Making       36 MINUTES SPENT BY ME on the date of service doing chart review, history, exam, documentation & further activities per the note.      Data   Recent Labs   Lab 04/23/23  0653 04/22/23  1541   WBC 14.6* 19.0*   HGB 12.7* 14.1   MCV 91 90    286    138   POTASSIUM 3.7 4.0   CHLORIDE 105 100   CO2 24 27   BUN 24.2* 25.9*   CR 1.51* 2.03*   ANIONGAP 10 11   MIKY 8.6* 9.6   * 121*   ALBUMIN 3.4*  --    PROTTOTAL 5.7*  --    BILITOTAL 1.0  --    ALKPHOS 90  --    ALT 14  --    AST 28  --

## 2023-04-23 NOTE — ANESTHESIA CARE TRANSFER NOTE
Patient: Don Venegas    Procedure: Procedure(s):  CYSTOSCOPY, LIMITED TRANSURETHRAL RESECTION OF PROSTATE       Diagnosis: Kidney stone [N20.0]  Diagnosis Additional Information: No value filed.    Anesthesia Type:   General     Note:    Oropharynx: oropharynx clear of all foreign objects  Level of Consciousness: awake  Oxygen Supplementation: face mask  Level of Supplemental Oxygen (L/min / FiO2): 6  Independent Airway: airway patency satisfactory and stable  Dentition: dentition unchanged  Vital Signs Stable: post-procedure vital signs reviewed and stable  Report to RN Given: handoff report given  Patient transferred to: PACU    Handoff Report: Identifed the Patient, Identified the Reponsible Provider, Reviewed the pertinent medical history, Discussed the surgical course, Reviewed Intra-OP anesthesia mangement and issues during anesthesia, Set expectations for post-procedure period and Allowed opportunity for questions and acknowledgement of understanding      Vitals:  Vitals Value Taken Time   /83 04/23/23 1834   Temp     Pulse 100 04/23/23 1840   Resp 13 04/23/23 1840   SpO2 95 % 04/23/23 1840   Vitals shown include unvalidated device data.    Electronically Signed By: DALILA Solis CRNA  April 23, 2023  6:40 PM

## 2023-04-23 NOTE — PROGRESS NOTES
Notified provider about indwelling laughlin catheter discussed removal or continued need.    Did provider choose to remove indwelling laughlin catheter? Yes after surgery, provider will remove in OR    Provider's laughlin indication for keeping indwelling laughlin catheter: Retention.    Is there an order for indwelling laughlin catheter? Yes    *If there is a plan to keep laughlin catheter in place at discharge daily notification with provider is not necessary, but please add a notation in the treatment team sticky note that the patient will be discharging with the catheter.

## 2023-04-23 NOTE — ED NOTES
RECEIVING UNIT ED HANDOFF REVIEW    ED Nurse Handoff Report was reviewed by: Miracle Carballo RN on April 22, 2023 at 8:48 PM

## 2023-04-23 NOTE — CONSULTS
[facility] Inpatient Consultation Note    Don Venegas MRN# [id number]   Age: 72 year old YOB: 1950     Date of Admission:  4/22/2023    Reason for consult: Right UVJ stone       Requesting physician: Dr Loredo                   History of Present Illness:     73 yo M admitted with flank pain and dysuria x 3 days. CT here reveals small right UVJ stone with fluid around kidney suggesting calyceal rupture.     Febrile. Cr 2.03, WBC 19, UA with RBC > WBC and culture pending. Started on Rocephin in ED.     Endorses difficulty urinating recently, weak stream, burning with urination. Bladder scan for 999cc last night; laughlin placed for 700cc. Denies fever/chills, n/v. He did eat this morning.             Past Medical History:   No past medical history on file.          Past Surgical History:   No past surgical history on file.          Social History:     Social History     Socioeconomic History     Marital status: Single     Spouse name: Not on file     Number of children: Not on file     Years of education: Not on file     Highest education level: Not on file   Occupational History     Not on file   Tobacco Use     Smoking status: Not on file     Smokeless tobacco: Not on file   Substance and Sexual Activity     Alcohol use: Not on file     Drug use: Not on file     Sexual activity: Not on file   Other Topics Concern     Not on file   Social History Narrative     Not on file     Social Determinants of Health     Financial Resource Strain: Not on file   Food Insecurity: Not on file   Transportation Needs: Not on file   Physical Activity: Not on file   Stress: Not on file   Social Connections: Not on file   Intimate Partner Violence: Not on file   Housing Stability: Not on file             Family History:   No family history on file.          Immunizations:     There is no immunization history on file for this patient.          Allergies:     Allergies   Allergen Reactions     Gluten Meal            "   Medications:     Current Facility-Administered Medications   Medication     acetaminophen (TYLENOL) tablet 650 mg    Or     acetaminophen (TYLENOL) Suppository 650 mg     ceFAZolin (ANCEF) 2 g in 100 mL D5W intermittent infusion     cefTRIAXone (ROCEPHIN) 2 g vial to attach to  ml bag for ADULTS or NS 50 ml bag for PEDS     lidocaine (LMX4) cream     lidocaine 1 % 0.1-1 mL     melatonin tablet 1 mg     naloxone (NARCAN) injection 0.2 mg    Or     naloxone (NARCAN) injection 0.4 mg    Or     naloxone (NARCAN) injection 0.2 mg    Or     naloxone (NARCAN) injection 0.4 mg     ondansetron (ZOFRAN ODT) ODT tab 4 mg    Or     ondansetron (ZOFRAN) injection 4 mg     oxyCODONE (ROXICODONE) tablet 5 mg     sodium chloride (PF) 0.9% PF flush 3 mL     sodium chloride (PF) 0.9% PF flush 3 mL     sodium chloride 0.9% infusion     tamsulosin (FLOMAX) capsule 0.4 mg             Review of Systems:   Comprehensive review of systems from the Admission note dated 4/22/23 at [facility] was reviewed with no changes except per HPI.     Examination:  /60 (BP Location: Right arm)   Pulse 76   Temp 97.8  F (36.6  C) (Oral)   Resp 16   Ht 1.778 m (5' 10\")   Wt 68 kg (150 lb)   SpO2 (P) 97%   BMI 21.52 kg/m              Data:     Lab Results   Component Value Date    WBC 14.6 04/23/2023     Lab Results   Component Value Date    RBC 4.01 04/23/2023     Lab Results   Component Value Date    HGB 12.7 04/23/2023     Lab Results   Component Value Date    HCT 36.3 04/23/2023     Lab Results   Component Value Date     04/23/2023     Creatinine   Date Value Ref Range Status   04/23/2023 1.51 (H) 0.67 - 1.17 mg/dL Final   ]  Lab Results   Component Value Date    BUN 24.2 04/23/2023       Imaging:    CT AP WO 4/22/23  IMPRESSION:   1.  Right hydronephrosis/hydroureter with a tiny calcification near the right UVJ and this is favored to represent a small UVJ stone.  2.  Retroperitoneal edema and fluid " surrounding right kidney suggests calyceal fornix rupture.       Impression:  71 yo M with small right UVJ stone, calyceal fornix rupture, SHANON    Plan:  - To OR today with Dr Cuellar for right ureteral stent placement once NPO for several hours and safe for anesthesia   - Trend Cr, follow UCx  - Agree with laughlin placement and starting Flomax d/t difficulty voiding      Yasmin Kruse PA-C  [facility]   Pager: [phone]  Office: [phone]

## 2023-04-24 ENCOUNTER — APPOINTMENT (OUTPATIENT)
Dept: INTERVENTIONAL RADIOLOGY/VASCULAR | Facility: CLINIC | Age: 73
DRG: 713 | End: 2023-04-24
Attending: UROLOGY
Payer: MEDICARE

## 2023-04-24 LAB
BACTERIA UR CULT: NORMAL
GLUCOSE BLDC GLUCOMTR-MCNC: 113 MG/DL (ref 70–99)

## 2023-04-24 PROCEDURE — 250N000013 HC RX MED GY IP 250 OP 250 PS 637: Performed by: STUDENT IN AN ORGANIZED HEALTH CARE EDUCATION/TRAINING PROGRAM

## 2023-04-24 PROCEDURE — 272N000500 HC NEEDLE CR2

## 2023-04-24 PROCEDURE — 99232 SBSQ HOSP IP/OBS MODERATE 35: CPT | Performed by: HOSPITALIST

## 2023-04-24 PROCEDURE — 250N000011 HC RX IP 250 OP 636: Performed by: STUDENT IN AN ORGANIZED HEALTH CARE EDUCATION/TRAINING PROGRAM

## 2023-04-24 PROCEDURE — C1729 CATH, DRAINAGE: HCPCS

## 2023-04-24 PROCEDURE — 258N000003 HC RX IP 258 OP 636: Performed by: UROLOGY

## 2023-04-24 PROCEDURE — 272N000147 HC KIT CR7

## 2023-04-24 PROCEDURE — 250N000011 HC RX IP 250 OP 636: Performed by: NURSE PRACTITIONER

## 2023-04-24 PROCEDURE — 250N000013 HC RX MED GY IP 250 OP 250 PS 637: Performed by: HOSPITALIST

## 2023-04-24 PROCEDURE — 50432 PLMT NEPHROSTOMY CATHETER: CPT

## 2023-04-24 PROCEDURE — 120N000001 HC R&B MED SURG/OB

## 2023-04-24 PROCEDURE — 250N000009 HC RX 250: Performed by: NURSE PRACTITIONER

## 2023-04-24 PROCEDURE — 0T9330Z DRAINAGE OF RIGHT KIDNEY PELVIS WITH DRAINAGE DEVICE, PERCUTANEOUS APPROACH: ICD-10-PCS | Performed by: RADIOLOGY

## 2023-04-24 PROCEDURE — C1769 GUIDE WIRE: HCPCS

## 2023-04-24 RX ORDER — NALOXONE HYDROCHLORIDE 0.4 MG/ML
0.2 INJECTION, SOLUTION INTRAMUSCULAR; INTRAVENOUS; SUBCUTANEOUS
Status: DISCONTINUED | OUTPATIENT
Start: 2023-04-24 | End: 2023-04-24

## 2023-04-24 RX ORDER — NALOXONE HYDROCHLORIDE 0.4 MG/ML
0.4 INJECTION, SOLUTION INTRAMUSCULAR; INTRAVENOUS; SUBCUTANEOUS
Status: DISCONTINUED | OUTPATIENT
Start: 2023-04-24 | End: 2023-04-24

## 2023-04-24 RX ORDER — DOCUSATE SODIUM 100 MG/1
100 CAPSULE, LIQUID FILLED ORAL 2 TIMES DAILY
Status: DISCONTINUED | OUTPATIENT
Start: 2023-04-24 | End: 2023-04-28 | Stop reason: HOSPADM

## 2023-04-24 RX ORDER — FENTANYL CITRATE 50 UG/ML
25-50 INJECTION, SOLUTION INTRAMUSCULAR; INTRAVENOUS EVERY 5 MIN PRN
Status: DISCONTINUED | OUTPATIENT
Start: 2023-04-24 | End: 2023-04-26 | Stop reason: HOSPADM

## 2023-04-24 RX ORDER — SENNOSIDES 8.6 MG
8.6 TABLET ORAL 2 TIMES DAILY PRN
Status: DISCONTINUED | OUTPATIENT
Start: 2023-04-24 | End: 2023-04-28 | Stop reason: HOSPADM

## 2023-04-24 RX ORDER — FLUMAZENIL 0.1 MG/ML
0.2 INJECTION, SOLUTION INTRAVENOUS
Status: DISCONTINUED | OUTPATIENT
Start: 2023-04-24 | End: 2023-04-26 | Stop reason: HOSPADM

## 2023-04-24 RX ORDER — POLYETHYLENE GLYCOL 3350 17 G/17G
17 POWDER, FOR SOLUTION ORAL DAILY PRN
Status: DISCONTINUED | OUTPATIENT
Start: 2023-04-24 | End: 2023-04-28 | Stop reason: HOSPADM

## 2023-04-24 RX ADMIN — SODIUM CHLORIDE: 9 INJECTION, SOLUTION INTRAVENOUS at 10:45

## 2023-04-24 RX ADMIN — MIDAZOLAM 2 MG: 1 INJECTION INTRAMUSCULAR; INTRAVENOUS at 09:05

## 2023-04-24 RX ADMIN — ACETAMINOPHEN 650 MG: 325 TABLET ORAL at 15:37

## 2023-04-24 RX ADMIN — DOCUSATE SODIUM 100 MG: 100 CAPSULE, LIQUID FILLED ORAL at 21:29

## 2023-04-24 RX ADMIN — LIDOCAINE HYDROCHLORIDE 6 ML: 10 INJECTION, SOLUTION INFILTRATION; PERINEURAL at 09:07

## 2023-04-24 RX ADMIN — TAMSULOSIN HYDROCHLORIDE 0.4 MG: 0.4 CAPSULE ORAL at 08:22

## 2023-04-24 RX ADMIN — DOCUSATE SODIUM 100 MG: 100 CAPSULE, LIQUID FILLED ORAL at 10:45

## 2023-04-24 RX ADMIN — FENTANYL CITRATE 50 MCG: 50 INJECTION, SOLUTION INTRAMUSCULAR; INTRAVENOUS at 08:55

## 2023-04-24 RX ADMIN — SENNOSIDES 8.6 MG: 8.6 TABLET, FILM COATED ORAL at 10:45

## 2023-04-24 RX ADMIN — CEFTRIAXONE SODIUM 2 G: 2 INJECTION, POWDER, FOR SOLUTION INTRAMUSCULAR; INTRAVENOUS at 18:06

## 2023-04-24 RX ADMIN — FENTANYL CITRATE 50 MCG: 50 INJECTION, SOLUTION INTRAMUSCULAR; INTRAVENOUS at 09:05

## 2023-04-24 ASSESSMENT — ACTIVITIES OF DAILY LIVING (ADL)
ADLS_ACUITY_SCORE: 39
ADLS_ACUITY_SCORE: 41
ADLS_ACUITY_SCORE: 41
ADLS_ACUITY_SCORE: 36
ADLS_ACUITY_SCORE: 41
ADLS_ACUITY_SCORE: 39
ADLS_ACUITY_SCORE: 39
ADLS_ACUITY_SCORE: 41
ADLS_ACUITY_SCORE: 39
ADLS_ACUITY_SCORE: 39

## 2023-04-24 NOTE — ANESTHESIA POSTPROCEDURE EVALUATION
Patient: Don Venegas    Procedure: Procedure(s):  CYSTOSCOPY, LIMITED TRANSURETHRAL RESECTION OF PROSTATE       Anesthesia Type:  General    Note:  Disposition: Inpatient   Postop Pain Control: Uneventful            Sign Out: Well controlled pain   PONV: No   Neuro/Psych: Uneventful            Sign Out: Acceptable/Baseline neuro status   Airway/Respiratory: Uneventful            Sign Out: Acceptable/Baseline resp. status   CV/Hemodynamics: Uneventful            Sign Out: Acceptable CV status; No obvious hypovolemia; No obvious fluid overload   Other NRE: NONE   DID A NON-ROUTINE EVENT OCCUR? No           Last vitals:  Vitals Value Taken Time   /83 04/23/23 1900   Temp     Pulse 87 04/23/23 1910   Resp 12 04/23/23 1910   SpO2 99 % 04/23/23 1912   Vitals shown include unvalidated device data.    Electronically Signed By: Raul Hudson MD  April 23, 2023  9:06 PM

## 2023-04-24 NOTE — IR NOTE
Patient Name: Don Venegas  Medical Record Number: [id number]  Today's Date: 4/24/2023    Procedure: Right Nephrostomy Tube Placement  Proceduralist: Dr. Vides  Pathology present: no    Procedure Start: 0904  Procedure end: 0911  Sedation medications administered: Last Dose: 0905, Fentanyl 100 mcg, Versed 2 mg, Lidocaine 6 ml's.    Report given to: Mary Beth Lees RN  : no    Other Notes: Pt will require 1 bedrest post procedure. Pt arrived to IR room 2 from 2319. Consent reviewed. Pt denies any questions or concerns regarding procedure. Pt positioned prone and monitored per protocol. Pt tolerated procedure without any noted complications.

## 2023-04-24 NOTE — PLAN OF CARE
Goal Outcome Evaluation:      Plan of Care Reviewed With: patient    Overall Patient Progress: improving    Outcome Evaluation: neph tube placement complete    Date/Time: 4/24 3-11:30 shift    Trauma/Ortho/Medical (Choose one) Medical    Diagnosis: Urinary retention w/hydronephrosis and hydroureter  POD#: 1 from cytoscopy and minimal TURP, 0 from nephrostomy tube placement  Mental Status: A&O x4  Activity/dangle: SBA x1  Diet: Regular  Pain: Mild to moderate, received tylenol at 1537 with good effect  May/Voiding: May to be left until after discharge  Tele/Restraints/Iso: No  02/LDA: PIV with NS @50 mL/hr. Nephrostomy tube draining blood tinged fluid.   D/C Date: Estimated 1-2 days pending need for ureter stent possibly being done tomorrow

## 2023-04-24 NOTE — PRE-PROCEDURE
GENERAL PRE-PROCEDURE:   Procedure:  Right nephrostomy tube placement with moderate sedation   Date/Time:  4/24/2023 8:10 AM    Written consent obtained?: Yes    Risks and benefits: Risks, benefits and alternatives were discussed    Consent given by:  Patient  Patient states understanding of procedure being performed: Yes    Patient's understanding of procedure matches consent: Yes    Procedure consent matches procedure scheduled: Yes    Expected level of sedation:  Moderate  Appropriately NPO:  Yes  ASA Class:  3  Mallampati  :  Grade 2- soft palate, base of uvula, tonsillar pillars, and portion of posterior pharyngeal wall visible  Lungs:  Lungs clear with good breath sounds bilaterally  Heart:  Normal heart sounds and rate  History & Physical reviewed:  History and physical reviewed and no updates needed  Statement of review:  I have reviewed the lab findings, diagnostic data, medications, and the plan for sedation    Total time: 20 minutes    Thanks [facility] Interventional Radiology CNP ([phone]) (phone [phone])

## 2023-04-24 NOTE — PLAN OF CARE
Notified provider about indwelling laughlin catheter discussed removal or continued need.    Did provider choose to remove indwelling laughlin catheter? Yes    Provider's laughlin indication for keeping indwelling laughlin catheter: /GI/GYN Pelvic Procedure . Plan to leave in at discharge    Is there an order for indwelling laughlin catheter? Yes    *If there is a plan to keep laughlin catheter in place at discharge daily notification with provider is not necessary, but please add a notation in the treatment team sticky note that the patient will be discharging with the catheter.

## 2023-04-24 NOTE — PROGRESS NOTES
[facility]    Urology Progress Note  Date of Service: 04/24/2023      Interval History   Patient went with IR this am, right PCN placed. Says he feels better now. cbi clamped.     AVSS.  Creatinine: 1.51--1.46    Assessment & Plan   73 yo M with small right UVJ stone, calyceal fornix rupture, SHANON     Dr. Cuellar took to OR 4/23, unable to see UO for stent placement. Did limited TUR.     Went to IR this morning for right PCN placement.      - Trend creatinine. If not improving after PCN on the right. Consider re imaging to evaluate the left side.    - Ideally convert PCN to stent.    - Continue laughlin, will plan to discharge with laughlin.    - Disconnect cbi.    - Follow path result from prostate resection.     Physical Exam   Temp:  [97.8  F (36.6  C)-98.8  F (37.1  C)] 98.8  F (37.1  C)  Pulse:  [73-90] 73  Resp:  [12-16] 15  BP: (111-150)/(55-81) 113/57  SpO2:  [92 %-98 %] 96 %    Weights:   Vitals:    04/22/23 1213   Weight: 68 kg (150 lb)    Body mass index is 21.52 kg/m .    Constitutional: Alert. Pleasant. No acute distress.   Respiratory: No respiratory distress. Breathing unlabored.   GI: Soft, nontender.  Male :laughlin in place with gabby outputs. Right PCN now also in place- blood tinged output.  Skin: Warm and dry.  Musculoskeletal: Moving all extremities approprietly.    Data   Recent Labs   Lab 04/24/23  0602 04/23/23  2254 04/23/23  1958 04/23/23  0653 04/22/23  1541   WBC  --   --   --  14.6* 19.0*   HGB  --   --   --  12.7* 14.1   MCV  --   --   --  91 90   PLT  --   --   --  228 286   INR  --  1.18*  --   --   --    NA  --   --  140 139 138   POTASSIUM  --   --  3.9 3.7 4.0   CHLORIDE  --   --  105 105 100   CO2  --   --  21* 24 27   BUN  --   --  24.0* 24.2* 25.9*   CR  --   --  1.46* 1.51* 2.03*   ANIONGAP  --   --  14 10 11   MIKY  --   --  8.5* 8.6* 9.6   *  --  107* 115* 121*   ALBUMIN  --   --   --  3.4*  --    PROTTOTAL  --   --   --  5.7*  --    BILITOTAL  --    --   --  1.0  --    ALKPHOS  --   --   --  90  --    ALT  --   --   --  14  --    AST  --   --   --  28  --        Recent Labs   Lab 04/24/23  0602 04/23/23 1958 04/23/23 0653 04/22/23  1541   * 107* 115* 121*        Unresulted Labs Ordered in the Past 30 Days of this Admission     Date and Time Order Name Status Description    4/23/2023  6:05 PM Surgical Pathology Exam In process            Imaging  No results found for this or any previous visit (from the past 24 hour(s)).     Marcia Dickson PA-C on 4/24/2023 at 9:46 AM   [facility]

## 2023-04-24 NOTE — PROGRESS NOTES
[facility]    Medicine Progress Note - Hospitalist Service    Date of Admission:  4/22/2023    Assessment & Plan   Don Venegas is a 72 year old male with limited past medical history admitted on 4/22/2023  with acute kidney injury (SHANON), urinary retention, and abnormal CT imaging - see report.    Possible acute pyelonephritis  Right hydronephrosis/hydroureter with tiny calcification near the right UVJ on CT 4/22  Retroperitoneal edema and fluid surrounding right kidney suggests calyceal fornix rupture on CT 4/22  S/p cystoscopy, limited transurethral resection of prostate (TURP) 4/23/23  S/p Right nephrostomy tube placement with IR, 4/24/23  Acute kidney injury (SHANON)   Benign prostatic hypertrophy (BPH)  Urinary retention with obstruction  CT scan abdomen/pelvis 4/22/23, see report.  - Urology consult follow-up, surgery 4/23, biopsies/pathology PENDING  - IR consult follow-up, procedure 4/24  - Consulted urology - unclear timing of possible cystoscopy and intervention  - consider re-imaging at some point, urology to review  - Continue IV fluids at adjusted rate, reassess daily  - Continue May per urology; CBI as per urology  - Continue IV Rocephin, urology to please review duration of use  - Urine culture 4/22 showed 10,000-50,000 CFU/mL mixture of urogenital olena   - pain control  - continue Flomax  - AM labs with basic profile, CBC     Constipation  - bowel regiment ordered 4/24, continue to monitor    Disposition   - estimated length of stay 24 to 48 hours  - anticipated discharge to home; social work consult     Diet: Regular Diet Adult    DVT Prophylaxis: Pneumatic Compression Devices  May Catheter: PRESENT, indication: Retention, /GI/GYN Pelvic Procedure  Lines: None     Cardiac Monitoring: None  Code Status: Full Code      Clinically Significant Risk Factors              # Hypoalbuminemia: Lowest albumin = 3.4 g/dL at 4/23/2023  6:53 AM, will monitor as appropriate                    Disposition Plan     Expected Discharge Date: 04/25/2023      Destination: home            Alexandre Timbo Redmond MD  Hospitalist Service  [facility]  Securely message with app2you (more info)  Text page via [employer] Paging/Directory   ______________________________________________________________________    Interval History   First visit with patient today. In good spirits, pleasant and interactive.  No report of pain.  Procedure with IR earlier this morning.    Physical Exam   Vital Signs: Temp: 98.8  F (37.1  C) Temp src: Oral BP: 113/57 Pulse: 73   Resp: 15 SpO2: 96 % O2 Device: None (Room air)    Weight: 150 lbs 0 oz    GENERAL awake and alert, lying in bed, no acute distress, in good spirits  HEENT no conjunctival injection or jaundice  LUNGS moderate inspiratory effort, no wheezes or crackles  HEART S1, S2 regular rate and rhythm; no rubs or gallops  ABDOMEN soft, nontender to palpation; no guarding, rebound, or rigidity  MUSCULOSKELETAL extremities without edema  SKIN warm and dry; right percutaneous nephrostomy tube with bandaging in place; May catheter also noted  NEURO moves upper and lower extremities spontaneously and to command  MENTAL STATUS answering questions and following simple commands    Medical Decision Making             Data     I have personally reviewed the following data over the past 24 hrs:    N/A  \   N/A   / N/A     140 105 24.0 (H) /  113 (H)   3.9 21 (L) 1.46 (H) \       INR:  1.18 (H) PTT:  N/A   D-dimer:  N/A Fibrinogen:  N/A       Imaging results reviewed over the past 24 hrs:   No results found for this or any previous visit (from the past 24 hour(s)).

## 2023-04-24 NOTE — PROGRESS NOTES
Date/Time 4/24/23/ 2300 -0730    Trauma/Ortho/Medical (Choose one) Medical    Diagnosis: Hydronephrosis  POD#: 1 from Cytoscopy and Mild TURP   Mental Status: A&O x 4  Activity/dangle: Stand by assist  Diet: NPO for Procedure today  Pain: Denied  May/Voiding: May  Tele/Restraints/Iso: NA  02/LDA: Continuous PIV NS at 75 ml/hr  D/C Date: Pending  Other Info: Pt is On continuous bladder irrigation with Sodium Chloride as needed due to TURP.

## 2023-04-24 NOTE — PROVIDER NOTIFICATION
MD Notification    Notified Person: MD    Notified Person Name: Dr. Redmond    Notification Date/Time: 04/24/23 9:40 AM    Notification Interaction: Text Page    Purpose of Notification: Pt just had R Neph tube placed, he is reporting some constipation can we have some bowl meds? Also a diet?     Orders Received:    Comments:

## 2023-04-24 NOTE — PROGRESS NOTES
Urology     71 yo M with small right UVJ stone, calyceal fornix rupture, SHANON     Dr. Cuellar took to OR 4/23, unable to see UO for stent placement. Did limited TUR.     - IR consult for right PCN with hopeful placement of right ureteral stent.    - Trend creatinine. If not improving after PCN on the right. Consider re imaging to evaluate the left side.    - Continue laughlin, will plan to discharge with laughlin.    - Wean CBI today.    - Follow path result from prostate resection.      I will plan to see patient later today.     Marcia Dickson PA-C   [facility]  Office Phone: [phone]  After 4pm and on weekends, please call [phone].

## 2023-04-25 LAB
ANION GAP SERPL CALCULATED.3IONS-SCNC: 9 MMOL/L (ref 7–15)
BASOPHILS # BLD AUTO: 0 10E3/UL (ref 0–0.2)
BASOPHILS NFR BLD AUTO: 0 %
BUN SERPL-MCNC: 14.4 MG/DL (ref 8–23)
CALCIUM SERPL-MCNC: 8.6 MG/DL (ref 8.8–10.2)
CHLORIDE SERPL-SCNC: 105 MMOL/L (ref 98–107)
CREAT SERPL-MCNC: 0.83 MG/DL (ref 0.67–1.17)
DEPRECATED HCO3 PLAS-SCNC: 26 MMOL/L (ref 22–29)
EOSINOPHIL # BLD AUTO: 0 10E3/UL (ref 0–0.7)
EOSINOPHIL NFR BLD AUTO: 0 %
ERYTHROCYTE [DISTWIDTH] IN BLOOD BY AUTOMATED COUNT: 11.9 % (ref 10–15)
GFR SERPL CREATININE-BSD FRML MDRD: >90 ML/MIN/1.73M2
GLUCOSE SERPL-MCNC: 97 MG/DL (ref 70–99)
HCT VFR BLD AUTO: 35.4 % (ref 40–53)
HGB BLD-MCNC: 12.3 G/DL (ref 13.3–17.7)
IMM GRANULOCYTES # BLD: 0.1 10E3/UL
IMM GRANULOCYTES NFR BLD: 0 %
LYMPHOCYTES # BLD AUTO: 2 10E3/UL (ref 0.8–5.3)
LYMPHOCYTES NFR BLD AUTO: 15 %
MCH RBC QN AUTO: 31.6 PG (ref 26.5–33)
MCHC RBC AUTO-ENTMCNC: 34.7 G/DL (ref 31.5–36.5)
MCV RBC AUTO: 91 FL (ref 78–100)
MONOCYTES # BLD AUTO: 1.3 10E3/UL (ref 0–1.3)
MONOCYTES NFR BLD AUTO: 10 %
NEUTROPHILS # BLD AUTO: 9.8 10E3/UL (ref 1.6–8.3)
NEUTROPHILS NFR BLD AUTO: 75 %
NRBC # BLD AUTO: 0 10E3/UL
NRBC BLD AUTO-RTO: 0 /100
PLATELET # BLD AUTO: 226 10E3/UL (ref 150–450)
POTASSIUM SERPL-SCNC: 3.6 MMOL/L (ref 3.4–5.3)
RBC # BLD AUTO: 3.89 10E6/UL (ref 4.4–5.9)
SODIUM SERPL-SCNC: 140 MMOL/L (ref 136–145)
WBC # BLD AUTO: 13.2 10E3/UL (ref 4–11)

## 2023-04-25 PROCEDURE — 250N000013 HC RX MED GY IP 250 OP 250 PS 637: Performed by: UROLOGY

## 2023-04-25 PROCEDURE — 120N000001 HC R&B MED SURG/OB

## 2023-04-25 PROCEDURE — 99232 SBSQ HOSP IP/OBS MODERATE 35: CPT | Performed by: HOSPITALIST

## 2023-04-25 PROCEDURE — 258N000003 HC RX IP 258 OP 636: Performed by: HOSPITALIST

## 2023-04-25 PROCEDURE — 36415 COLL VENOUS BLD VENIPUNCTURE: CPT | Performed by: HOSPITALIST

## 2023-04-25 PROCEDURE — 85025 COMPLETE CBC W/AUTO DIFF WBC: CPT | Performed by: HOSPITALIST

## 2023-04-25 PROCEDURE — 80048 BASIC METABOLIC PNL TOTAL CA: CPT | Performed by: HOSPITALIST

## 2023-04-25 PROCEDURE — 250N000011 HC RX IP 250 OP 636: Performed by: STUDENT IN AN ORGANIZED HEALTH CARE EDUCATION/TRAINING PROGRAM

## 2023-04-25 PROCEDURE — 250N000013 HC RX MED GY IP 250 OP 250 PS 637: Performed by: HOSPITALIST

## 2023-04-25 PROCEDURE — 250N000013 HC RX MED GY IP 250 OP 250 PS 637: Performed by: STUDENT IN AN ORGANIZED HEALTH CARE EDUCATION/TRAINING PROGRAM

## 2023-04-25 RX ADMIN — ACETAMINOPHEN 650 MG: 325 TABLET ORAL at 08:41

## 2023-04-25 RX ADMIN — TAMSULOSIN HYDROCHLORIDE 0.4 MG: 0.4 CAPSULE ORAL at 08:42

## 2023-04-25 RX ADMIN — SODIUM CHLORIDE: 9 INJECTION, SOLUTION INTRAVENOUS at 03:44

## 2023-04-25 RX ADMIN — CEFTRIAXONE SODIUM 2 G: 2 INJECTION, POWDER, FOR SOLUTION INTRAMUSCULAR; INTRAVENOUS at 17:39

## 2023-04-25 RX ADMIN — ACETAMINOPHEN 650 MG: 325 TABLET ORAL at 01:10

## 2023-04-25 RX ADMIN — DOCUSATE SODIUM 100 MG: 100 CAPSULE, LIQUID FILLED ORAL at 08:42

## 2023-04-25 RX ADMIN — DOCUSATE SODIUM 100 MG: 100 CAPSULE, LIQUID FILLED ORAL at 21:06

## 2023-04-25 RX ADMIN — ACETAMINOPHEN 650 MG: 325 TABLET ORAL at 21:06

## 2023-04-25 RX ADMIN — OXYCODONE HYDROCHLORIDE 2.5 MG: 5 TABLET ORAL at 20:06

## 2023-04-25 RX ADMIN — SODIUM CHLORIDE: 9 INJECTION, SOLUTION INTRAVENOUS at 23:45

## 2023-04-25 ASSESSMENT — ACTIVITIES OF DAILY LIVING (ADL)
ADLS_ACUITY_SCORE: 41

## 2023-04-25 NOTE — PROGRESS NOTES
Date/Time:04/24 ,6812-5609    Trauma/Ortho/Medical (Choose one) :Medical    Diagnosis:Urinary retention w/hydronephrosis and hydroureter  POD#:1 right PCN placement  Mental Status:A&O x 4  Activity/dangle:A-1 SBA  Diet:Reg  Pain:PRN Tylenol,oxycodone available  Laughlin/Voiding:Continue laughlin, will plan to discharge with laughlin  Tele/Restraints/Iso:N/A  02/LDA:RA  D/C Date:04/25??  Other Info:Nephrostomy tube draining blood tinged fluid.   NS @50 ml/hr continuous  Possible placement of stent??

## 2023-04-25 NOTE — PLAN OF CARE
Goal Outcome Evaluation:      Plan of Care Reviewed With: patient    Overall Patient Progress: improving        Trauma/Ortho/Medical (Choose one) Medical    Diagnosis:Urinary retention with hydronephrosis and hydroureter   POD#:2 of cystoscopy with TURP. POD 1 of Neph tupe placement   Mental Status: A&O x4  Activity/dangle Up with SBA with GW  Diet: Reg, NPO at midnight for stent placement.  Pain: Tylenol  Laughlin/Voiding: via laughlin  Tele/Restraints/Iso:NA  02/LDA: NS infusing at 50 ml/hr  D/C Date: TBD  Other Info: Neph drain of 360 ml.

## 2023-04-25 NOTE — PLAN OF CARE
Goal Outcome Evaluation:      Plan of Care Reviewed With: patient    Overall Patient Progress: improving     Date/Time: 4/25 3-11:30 shift     Trauma/Ortho/Medical (Choose one) Medical     Diagnosis: Urinary retention w/hydronephrosis and hydroureter  POD#: 2 from cytoscopy and minimal TURP, 1 from nephrostomy tube placement  Mental Status: A&O x4  Activity/dangle: SBA x1  Diet: Regular  Pain: Complained of back pain, was slumped down in bed, given boost and felt better  May/Voiding: May to be left until after discharge  Tele/Restraints/Iso: No  02/LDA: PIV with NS @50 mL/hr. Nephrostomy tube draining blood tinged fluid.   D/C Date: Estimated 1-2 days   Other info: to have stent placed tomorrow. Has not had BM yet.

## 2023-04-25 NOTE — PROGRESS NOTES
"[facility]    Urology Progress Note     Assessment & Plan   Don Venegas is a 71 yo M admitted with small right UVJ stone, calyceal fornix rupture, SHANON, and urinary retention. Dr Cuellar took to OR 4/23, unable to see UO for stent placement, limited TURP performed. CBI weaned 4/24. Now s/p R PCN placement with IR on 2/24.     Interval History   R PCN placed by IR yesterday, draining well, good outputs, blood tinged (450 / 1090 ml). Laughlin in place, CBI disconnected, clear outputs and also with good UOP (500 / 1625 ml via laughlin). He is feeling stronger today, still some flank pain.     AVSS  Cr normalized today to 0.83  WBC improving 19 -- 14 -- 13.   UCx from 4/22 negative     Plan:   - Clinically improving well s/p PCN placement. Would hold off on re-imaging at this time.   - Will plan for internalization of right PCN to antegrade stent when PCN outputs clear, likely tomorrow. NPO at midnight for stent placement tomorrow with IR.   - Anticipate likely OK for discharge tomorrow with laughlin pending IR procedure   - F/u with urology in about 2 weeks for TOV, repeat CT, and possible stent removal vs. discuss definitive stone surgery pending passage of small distal stone with stent in place.  - Recommend 14 day total course of antibiotic; could discharge tomorrow with 10 days oral     D/w Dr Cuellar.     Yasmin Kruse PA-C  [facility]  Pager: [phone]  Office: [phone]      OBJECTIVE:          /51 (BP Location: Right arm)   Pulse 82   Temp 99.5  F (37.5  C) (Oral)   Resp 16   Ht 1.778 m (5' 10\")   Wt 68 kg (150 lb)   SpO2 97%   BMI 21.52 kg/m      Intake/Output Summary (Last 24 hours) at 4/25/2023 0839  Last data filed at 4/25/2023 0700  Gross per 24 hour   Intake 1317 ml   Output 2715 ml   Net -1398 ml            General appearance shows no deformaties and good grooming in no acute distress.  EYES: no icterus  HEAD, EARS, NOSE, MOUTH, AND THROAT: " atraumatic, normocephalic  CARDIAC: skin well perfused, no LE edema  RESPIRATORY: breathing unlabored  ABDOMEN: soft, non tender, non distended  : May draining clear urine. Right nephrostomy tube draining gabby, blood-tinged urine.   SKIN/HAIR/NAILS: no visible rashes  NEUROLOGIC: no focal deficits  PSYCHIATRIC: Speech, mood, and affect normal        Yasmin Kruse PA-C  [facility]   Pager: [phone]  Office: [phone]

## 2023-04-25 NOTE — PROGRESS NOTES
Interventional Radiology Progress Note:  Inpatient at [facility]  Date: April 25, 2023     History: Don Venegas is a 72 year old male with admitted on 4/22/2023  with acute kidney injury (SHANON), urinary retention, and abnormal CT imaging. He was found to have a R hydronephrosis, kidney stone and a  calyceal rupture. He is s/p cystoscopy, limited (TURP) 4/23/23, urology unable to place retrograde stent and is s/p right nephrostomy tube placement in IR 4/24.     Don is being seen in IR follow up today.     Interval History: Feeling stronger and better today. Ate all of breakfast. Intermittent pain from nephrostomy tube.     Physical Exam:   Vitals:Temp:  [98.5  F (36.9  C)-99.5  F (37.5  C)] 99.5  F (37.5  C)  Pulse:  [73-87] 82  Resp:  [12-16] 16  BP: (113-143)/(51-81) 130/51  SpO2:  [92 %-98 %] 97 %    General: Pleasant, cooperative, thin male sitting in bed in no acute distress.    Neuro:  A&O x 4. Moves all extremities equally.  Resp:  Normal respirations on room air. Non labored breathing. Equal air entry B/L   Abdomen:  Soft, non-distended, non-tender.    R nephrostomy Drain:   -Dressing is C/D/I.   -Tube is draining serosanguinous urine.     Outputs since placement: 1100 mL    Labs:  ROUTINE ICU LABS (Last four results)  CMPRecent Labs   Lab 04/24/23  0602 04/23/23 1958 04/23/23  0653 04/22/23  1541   NA  --  140 139 138   POTASSIUM  --  3.9 3.7 4.0   CHLORIDE  --  105 105 100   CO2  --  21* 24 27   ANIONGAP  --  14 10 11   * 107* 115* 121*   BUN  --  24.0* 24.2* 25.9*   CR  --  1.46* 1.51* 2.03*   GFRESTIMATED  --  51* 49* 34*   MIKY  --  8.5* 8.6* 9.6   PROTTOTAL  --   --  5.7*  --    ALBUMIN  --   --  3.4*  --    BILITOTAL  --   --  1.0  --    ALKPHOS  --   --  90  --    AST  --   --  28  --    ALT  --   --  14  --      CBC  Recent Labs   Lab 04/23/23  0653 04/22/23  1541   WBC 14.6* 19.0*   RBC 4.01* 4.54   HGB 12.7* 14.1   HCT 36.3* 41.0   MCV 91 90   MCH 31.7 31.1   MCHC  35.0 34.4   RDW 12.0 12.0    286     INR  Recent Labs   Lab 04/23/23  2254   INR 1.18*     Arterial Blood Gas  Recent Labs   Lab 04/22/23  1807   PH 7.36     Assessment: Don Venegas is a 72 year old male with admitted on 4/22/2023  with acute kidney injury (SHANON), urinary retention, and abnormal CT imaging. He was found to have a R hydronephrosis, kidney stone and a  calyceal rupture. He is s/p cystoscopy, limited (TURP) 4/23/23, urology unable to place retrograde stent and is s/p right nephrostomy tube placement in IR 4/24.     Successful right nephrostomy tube placement with good UO, Labs not back yet from today   -Discussed briefly things to think about, that he will need a person to help with dressing changes (Don thinks he has friends that can help with that), also discussed showering and covering the site with plastic.     Plan:   -Monitor WBC and kidney function   -Continue to follow regarding discharge planning, instructions  -Further plans per Urology     Total time spent on the date of the encounter is 20 minutes, including time spent counseling the patient, performing a medically appropriate evaluation, reviewing prior medical history, ordering medications and tests, documenting clinical information in the medical record, and communication of results.    Thanks [facility] Interventional Radiology CNP ([phone]) (phone [phone])

## 2023-04-25 NOTE — PROGRESS NOTES
[facility]    Medicine Progress Note - Hospitalist Service    Date of Admission:  4/22/2023    Assessment & Plan   Don Venegas is a 72 year old male with limited past medical history admitted on 4/22/2023  with acute kidney injury (SHANON), urinary retention, and abnormal CT imaging - see report.    Possible acute pyelonephritis  Right hydronephrosis/hydroureter with tiny calcification near the right UVJ on CT 4/22  Retroperitoneal edema and fluid surrounding right kidney suggests calyceal fornix rupture on CT 4/22  S/p cystoscopy, limited transurethral resection of prostate (TURP) 4/23/23  S/p Right nephrostomy tube placement with IR, 4/24/23  Acute kidney injury (SHANON)   Benign prostatic hypertrophy (BPH)  Urinary retention with obstruction  CT scan abdomen/pelvis 4/22/23, see report.  - Urology consult follow-up, surgery 4/23, biopsies/pathology PENDING, follow-up note 4/25, possible internalization for right PCN to antegrade stent 4/26  - IR consult follow-up, procedure 4/24, follow-up note 4/25 reviewed  - timing of future re-imaging as per urology  - Continue IV fluids, reassess daily  - Continue May per urology with plans for discharge with May   - Continue IV Rocephin in hospital, urology planning 10 days of oral therapy on discharge  - Urine culture 4/22 showed 10,000-50,000 CFU/mL mixture of urogenital olena   - pain control  - continue Flomax     Constipation  - bowel medications ordered 4/24, continue to monitor    Disposition   - estimated length of stay 24 to 48 hours  - anticipated discharge to home; social work consult     Diet: Regular Diet Adult  NPO per Anesthesia Guidelines for Procedure/Surgery Except for: Meds    DVT Prophylaxis: Pneumatic Compression Devices  May Catheter: PRESENT, indication: Retention, Retention  Lines: None     Cardiac Monitoring: None  Code Status: Full Code      Clinically Significant Risk Factors              # Hypoalbuminemia: Lowest  albumin = 3.4 g/dL at 4/23/2023  6:53 AM, will monitor as appropriate                   Disposition Plan      Expected Discharge Date: 04/26/2023      Destination: home            Alexandre Redmond MD  Hospitalist Service  [facility]  Securely message with [employer] (more info)  Text page via [facility] Paging/Directory   ______________________________________________________________________    Interval History   First visit with patient yesterday.  Resting comfortably, lying in bed, in good spirits.  Mild to moderate discomfort right flank and area of nephrostomy tube.  Otherwise no new complaints.  Urology and interventional radiology follow-up.    Physical Exam   Vital Signs: Temp: 99.5  F (37.5  C) Temp src: Oral BP: 130/51 Pulse: 82   Resp: 16 SpO2: 97 % O2 Device: None (Room air)    Weight: 150 lbs 0 oz    GENERAL awake and alert, sitting up in bed, pleasant and interactive  HEENT no conjunctival injection or jaundice  LUNGS moderate inspiratory effort, no wheezes or crackles  HEART S1, S2 regular rate and rhythm; no rubs or gallops  ABDOMEN soft, nontender to palpation; no guarding, rebound, or rigidity  MUSCULOSKELETAL extremities without edema  SKIN warm and dry; right percutaneous nephrostomy tube with bandaging in place; May catheter also noted  NEURO moves upper and lower extremities spontaneously and to command  MENTAL STATUS answering questions and following simple commands    Medical Decision Making             Data     I have personally reviewed the following data over the past 24 hrs:    13.2 (H)  \   12.3 (L)   / 226     140 105 14.4 /  97   3.6 26 0.83 \       Imaging results reviewed over the past 24 hrs:   No results found for this or any previous visit (from the past 24 hour(s)).

## 2023-04-26 ENCOUNTER — APPOINTMENT (OUTPATIENT)
Dept: NUCLEAR MEDICINE | Facility: CLINIC | Age: 73
DRG: 713 | End: 2023-04-26
Payer: MEDICARE

## 2023-04-26 ENCOUNTER — MEDICAL CORRESPONDENCE (OUTPATIENT)
Dept: HEALTH INFORMATION MANAGEMENT | Facility: CLINIC | Age: 73
End: 2023-04-26

## 2023-04-26 ENCOUNTER — APPOINTMENT (OUTPATIENT)
Dept: INTERVENTIONAL RADIOLOGY/VASCULAR | Facility: CLINIC | Age: 73
DRG: 713 | End: 2023-04-26
Payer: MEDICARE

## 2023-04-26 LAB
PATH REPORT.COMMENTS IMP SPEC: ABNORMAL
PATH REPORT.COMMENTS IMP SPEC: ABNORMAL
PATH REPORT.COMMENTS IMP SPEC: YES
PATH REPORT.FINAL DX SPEC: ABNORMAL
PATH REPORT.GROSS SPEC: ABNORMAL
PATH REPORT.MICROSCOPIC SPEC OTHER STN: ABNORMAL
PATH REPORT.RELEVANT HX SPEC: ABNORMAL
PHOTO IMAGE: ABNORMAL
PSA SERPL DL<=0.01 NG/ML-MCNC: 145 NG/ML (ref 0–6.5)

## 2023-04-26 PROCEDURE — C1769 GUIDE WIRE: HCPCS

## 2023-04-26 PROCEDURE — 250N000011 HC RX IP 250 OP 636: Performed by: PHYSICIAN ASSISTANT

## 2023-04-26 PROCEDURE — 88341 IMHCHEM/IMCYTCHM EA ADD ANTB: CPT | Mod: 26 | Performed by: PATHOLOGY

## 2023-04-26 PROCEDURE — 99152 MOD SED SAME PHYS/QHP 5/>YRS: CPT

## 2023-04-26 PROCEDURE — 88342 IMHCHEM/IMCYTCHM 1ST ANTB: CPT | Mod: 26 | Performed by: PATHOLOGY

## 2023-04-26 PROCEDURE — 250N000009 HC RX 250: Performed by: PHYSICIAN ASSISTANT

## 2023-04-26 PROCEDURE — 36415 COLL VENOUS BLD VENIPUNCTURE: CPT

## 2023-04-26 PROCEDURE — 250N000013 HC RX MED GY IP 250 OP 250 PS 637: Performed by: HOSPITALIST

## 2023-04-26 PROCEDURE — 120N000001 HC R&B MED SURG/OB

## 2023-04-26 PROCEDURE — 250N000009 HC RX 250: Performed by: RADIOLOGY

## 2023-04-26 PROCEDURE — 84153 ASSAY OF PSA TOTAL: CPT

## 2023-04-26 PROCEDURE — A9503 TC99M MEDRONATE: HCPCS | Performed by: RADIOLOGY

## 2023-04-26 PROCEDURE — 250N000011 HC RX IP 250 OP 636: Performed by: STUDENT IN AN ORGANIZED HEALTH CARE EDUCATION/TRAINING PROGRAM

## 2023-04-26 PROCEDURE — 250N000011 HC RX IP 250 OP 636: Performed by: NURSE PRACTITIONER

## 2023-04-26 PROCEDURE — 272N000116 HC CATH CR1

## 2023-04-26 PROCEDURE — C1758 CATHETER, URETERAL: HCPCS

## 2023-04-26 PROCEDURE — 88305 TISSUE EXAM BY PATHOLOGIST: CPT | Mod: 26 | Performed by: PATHOLOGY

## 2023-04-26 PROCEDURE — 258N000003 HC RX IP 258 OP 636: Performed by: HOSPITALIST

## 2023-04-26 PROCEDURE — 99232 SBSQ HOSP IP/OBS MODERATE 35: CPT | Performed by: HOSPITALIST

## 2023-04-26 PROCEDURE — 78306 BONE IMAGING WHOLE BODY: CPT | Mod: MG

## 2023-04-26 PROCEDURE — 250N000013 HC RX MED GY IP 250 OP 250 PS 637: Performed by: STUDENT IN AN ORGANIZED HEALTH CARE EDUCATION/TRAINING PROGRAM

## 2023-04-26 PROCEDURE — 0T9670Z DRAINAGE OF RIGHT URETER WITH DRAINAGE DEVICE, VIA NATURAL OR ARTIFICIAL OPENING: ICD-10-PCS | Performed by: RADIOLOGY

## 2023-04-26 PROCEDURE — 343N000001 HC RX 343: Performed by: RADIOLOGY

## 2023-04-26 PROCEDURE — C1729 CATH, DRAINAGE: HCPCS

## 2023-04-26 RX ORDER — NALOXONE HYDROCHLORIDE 0.4 MG/ML
0.2 INJECTION, SOLUTION INTRAMUSCULAR; INTRAVENOUS; SUBCUTANEOUS
Status: DISCONTINUED | OUTPATIENT
Start: 2023-04-26 | End: 2023-04-26 | Stop reason: HOSPADM

## 2023-04-26 RX ORDER — NALOXONE HYDROCHLORIDE 0.4 MG/ML
0.4 INJECTION, SOLUTION INTRAMUSCULAR; INTRAVENOUS; SUBCUTANEOUS
Status: DISCONTINUED | OUTPATIENT
Start: 2023-04-26 | End: 2023-04-26 | Stop reason: HOSPADM

## 2023-04-26 RX ORDER — LIDOCAINE HYDROCHLORIDE 20 MG/ML
JELLY TOPICAL ONCE
Status: COMPLETED | OUTPATIENT
Start: 2023-04-26 | End: 2023-04-26

## 2023-04-26 RX ORDER — TC 99M MEDRONATE 20 MG/10ML
25 INJECTION, POWDER, LYOPHILIZED, FOR SOLUTION INTRAVENOUS ONCE
Status: COMPLETED | OUTPATIENT
Start: 2023-04-26 | End: 2023-04-26

## 2023-04-26 RX ORDER — FLUMAZENIL 0.1 MG/ML
0.2 INJECTION, SOLUTION INTRAVENOUS
Status: DISCONTINUED | OUTPATIENT
Start: 2023-04-26 | End: 2023-04-26 | Stop reason: HOSPADM

## 2023-04-26 RX ORDER — FENTANYL CITRATE 50 UG/ML
25-50 INJECTION, SOLUTION INTRAMUSCULAR; INTRAVENOUS EVERY 5 MIN PRN
Status: DISCONTINUED | OUTPATIENT
Start: 2023-04-26 | End: 2023-04-26 | Stop reason: HOSPADM

## 2023-04-26 RX ADMIN — SODIUM CHLORIDE: 9 INJECTION, SOLUTION INTRAVENOUS at 19:37

## 2023-04-26 RX ADMIN — DOCUSATE SODIUM 100 MG: 100 CAPSULE, LIQUID FILLED ORAL at 08:07

## 2023-04-26 RX ADMIN — TC 99M MEDRONATE 28.2 MILLICURIE: 20 INJECTION, POWDER, LYOPHILIZED, FOR SOLUTION INTRAVENOUS at 12:56

## 2023-04-26 RX ADMIN — CEFTRIAXONE SODIUM 2 G: 2 INJECTION, POWDER, FOR SOLUTION INTRAMUSCULAR; INTRAVENOUS at 17:17

## 2023-04-26 RX ADMIN — LIDOCAINE HYDROCHLORIDE 6 ML: 20 JELLY TOPICAL at 09:17

## 2023-04-26 RX ADMIN — FENTANYL CITRATE 25 MCG: 50 INJECTION, SOLUTION INTRAMUSCULAR; INTRAVENOUS at 09:14

## 2023-04-26 RX ADMIN — ACETAMINOPHEN 650 MG: 325 TABLET ORAL at 19:36

## 2023-04-26 RX ADMIN — DOCUSATE SODIUM 100 MG: 100 CAPSULE, LIQUID FILLED ORAL at 19:36

## 2023-04-26 RX ADMIN — MIDAZOLAM 0.5 MG: 1 INJECTION INTRAMUSCULAR; INTRAVENOUS at 09:06

## 2023-04-26 RX ADMIN — FENTANYL CITRATE 50 MCG: 50 INJECTION, SOLUTION INTRAMUSCULAR; INTRAVENOUS at 08:59

## 2023-04-26 RX ADMIN — LIDOCAINE HYDROCHLORIDE 40 ML: 10 INJECTION, SOLUTION INFILTRATION; PERINEURAL at 09:12

## 2023-04-26 RX ADMIN — MIDAZOLAM 1 MG: 1 INJECTION INTRAMUSCULAR; INTRAVENOUS at 08:59

## 2023-04-26 RX ADMIN — FENTANYL CITRATE 25 MCG: 50 INJECTION, SOLUTION INTRAMUSCULAR; INTRAVENOUS at 09:07

## 2023-04-26 RX ADMIN — TAMSULOSIN HYDROCHLORIDE 0.4 MG: 0.4 CAPSULE ORAL at 08:07

## 2023-04-26 RX ADMIN — MIDAZOLAM 0.5 MG: 1 INJECTION INTRAMUSCULAR; INTRAVENOUS at 09:14

## 2023-04-26 ASSESSMENT — ACTIVITIES OF DAILY LIVING (ADL)
WEAR_GLASSES_OR_BLIND: YES
DIFFICULTY_EATING/SWALLOWING: NO
DOING_ERRANDS_INDEPENDENTLY_DIFFICULTY: NO
ADLS_ACUITY_SCORE: 41
ADLS_ACUITY_SCORE: 26
ADLS_ACUITY_SCORE: 41
DRESSING/BATHING_DIFFICULTY: NO
VISION_MANAGEMENT: GLASSES
CONCENTRATING,_REMEMBERING_OR_MAKING_DECISIONS_DIFFICULTY: NO
WALKING_OR_CLIMBING_STAIRS_DIFFICULTY: NO
ADLS_ACUITY_SCORE: 26
ADLS_ACUITY_SCORE: 41
ADLS_ACUITY_SCORE: 41
ADLS_ACUITY_SCORE: 26
ADLS_ACUITY_SCORE: 26
TOILETING_ISSUES: NO
FALL_HISTORY_WITHIN_LAST_SIX_MONTHS: NO
ADLS_ACUITY_SCORE: 41
CHANGE_IN_FUNCTIONAL_STATUS_SINCE_ONSET_OF_CURRENT_ILLNESS/INJURY: NO

## 2023-04-26 NOTE — PROGRESS NOTES
Notified provider about indwelling laughlin catheter discussed removal or continued need.    Did provider choose to remove indwelling laughlin catheter? NO    Provider's laughlin indication for keeping indwelling alughlin catheter: Indication for continued use: Retention WILL KEEP AT DISCHARGE    Is there an order for indwelling laughlin catheter? YES    *If there is a plan to keep laughlin catheter in place at discharge daily notification with provider is not necessary, but please add a notation in the treatment team sticky note that the patient will be discharging with the catheter.

## 2023-04-26 NOTE — PROGRESS NOTES
[facility]    Medicine Progress Note - Hospitalist Service    Date of Admission:  4/22/2023    Assessment & Plan   Don Venegas is a 72 year old male with limited past medical history admitted on 4/22/2023  with acute kidney injury (SHANON), urinary retention, and abnormal CT imaging - see report.  Newly diagnosed prostate cancer, high grade.    Newly diagnosed high grade prostate cancer  Possible acute pyelonephritis  Right hydronephrosis/hydroureter with tiny calcification near the right UVJ on CT 4/22  Retroperitoneal edema and fluid surrounding right kidney suggests calyceal fornix rupture on CT 4/22  S/p cystoscopy, limited transurethral resection of prostate (TURP) 4/23/23  S/p Right nephrostomy tube placement with IR, 4/24/23  Acute kidney injury (SHANON)   Benign prostatic hypertrophy (BPH)  Urinary retention with obstruction  CT scan abdomen/pelvis 4/22/23, see report.  - urology follow-up, note 4/26, new high grade prostate cancer, workup underway; defer to urology further testing and treatment options (bone scan); future follow-up with Dr. Dey noted  - IR consult follow-up, note 4/26, s/p Double-J stent placement; future repeat nephrostogram next week (Monday) as outpatient with possible tube removal at that time (urology note 4/26)  - 10 days oral antibiotic on discharge, urology to review choice (currently on IV ceftriaxone)  - future discharge with May catheter  - AM labs with serum Cr     Constipation  - bowel medications ordered 4/24, continue to monitor, reassess daily    Disposition   - estimated length of stay 24 hours  - anticipated discharge to home; social work consult     Diet: Regular Diet Adult    DVT Prophylaxis: Pneumatic Compression Devices  May Catheter: PRESENT, indication: Retention, Retention  Lines: None     Cardiac Monitoring: None  Code Status: Full Code      Clinically Significant Risk Factors              # Hypoalbuminemia: Lowest albumin = 3.4  g/dL at 4/23/2023  6:53 AM, will monitor as appropriate                   Disposition Plan      Expected Discharge Date: 04/27/2023      Destination: home            Alexandre Redmond MD  Hospitalist Service  [facility]  Securely message with [employer] (more info)  Text page via [employer] Paging/Directory   ______________________________________________________________________    Interval History   Comfortable, lying in bed, no acute distress.  Pain controlled, no new symptoms.    Physical Exam   Vital Signs: Temp: 97.9  F (36.6  C) Temp src: Oral BP: (!) 149/73 Pulse: 71   Resp: 12 SpO2: 99 % O2 Device: None (Room air)    Weight: 150 lbs 0 oz    GENERAL awake and alert, lying in bed, talkative and interactive   HEENT no conjunctival injection or jaundice  LUNGS moderate inspiratory effort, no wheezes or crackles  HEART S1, S2 regular rate and rhythm; no rubs or gallops  ABDOMEN soft, nontender to palpation; no guarding, rebound, or rigidity  MUSCULOSKELETAL extremities without edema  SKIN warm and dry; right percutaneous nephrostomy tube site examined with bandaging in place; May catheter also noted  NEURO moves upper and lower extremities spontaneously and to command  MENTAL STATUS answering questions and following simple commands, normal    Medical Decision Making             Data         Imaging results reviewed over the past 24 hrs:   Recent Results (from the past 24 hour(s))   IR Ureteral Stent Placement Right    Narrative    IR URETERAL STENT PLACEMENT RIGHT  4/26/2023 9:27 AM     HISTORY: Patient had a nephrostomy tube placed for a right renal  obstruction on 4/24/2023. Patient presents today for internalization  of the nephrostomy tube for a double-J stent.    COMPARISON: 4/24/2023    FINDINGS: After obtaining informed consent, the patient was placed in  a prone position on the fluoroscopy table. The skin entry site and  external portions of existing nephrostomy tube were prepped and  draped  in the usual sterile manner. 1% lidocaine was injected for local  anesthesia. Nephrostogram was performed. This showed prominence of the  right intrarenal collecting system with drainage of contrast into the  distal ureter but no emptying into the bladder.    Intervention: The existing nephrostomy tube was removed over a  Glidewire. The Glidewire was manipulated into the distal ureter with  the aid of a 5 Bulgarian Dunne catheter. The catheter wire  combination were then able to be passed across an area of narrowing in  the distal right ureter. A Super Stiff Amplatz wire was placed. Over  the wire, a 10 Bulgarian by 26 cm double J stent was placed. A repeat  nephrostogram was performed. There was slow emptying of the right  intrarenal collecting system through the double-J stent therefore, it  was elected to leave behind a 10 Bulgarian nephrostomy tube. A  fluoroscopic image was saved to document catheter positions.    I determined this patient to be an appropriate candidate for the  planned sedation and procedure and reassessed the patient immediately  prior to sedation and procedure. Moderate intravenous conscious  sedation was supervised by me. The patient was independently monitored  by a registered nurse assigned to the Department of radiology using  automated blood pressure, EKG and pulse oximetry. The patient  tolerated the procedure well. There were no immediate postprocedure  complications. The patient's vital signs were monitored by radiology  nursing staff under my supervision and remained stable throughout the  study. Radiation dose for this scan was reduced using automated  exposure control, adjustment of the mA and/or kV according to patient  size, or iterative reconstruction technique.    MEDICATIONS: Versed 2 mg, fentanyl 100 mcg    Sedation time: 29 minutes    Fluoroscopy time: 3.7 minutes    Total fluoroscopy dose: 12.3 mGy Air Kerma    Contrast: 30 mL Isovue-300    Local anesthetic: 40 mL       Impression    IMPRESSION: Double-J stent placed as above. Slow drainage from the  right intrarenal collecting system to the bladder through the double-J  stent, therefore, a right nephrostomy tube was left in place. The  nephrostomy tube will be capped at this time. A repeat nephrostogram  could be performed in 3-4 days. If there is satisfactory drainage  through the double-J stent, the nephrostomy tube will be removed.    ANNIKA TAM MD         SYSTEM ID:  [id number]

## 2023-04-26 NOTE — OP NOTE
Procedure Date: 04/26/2023    PREOPERATIVE DIAGNOSIS:  Infected, obstructive stone in the right distal ureter with hydronephrosis.    POSTOPERATIVE DIAGNOSIS:  Infected, obstructive stone in the right distal ureter with hydronephrosis.    PROCEDURE:  Cystoscopy, attempted retrograde, limited transurethral resection of the prostate.      SPECIMEN:  Prostatic chips.    FINDINGS:  A lot of inflammation at the trigone, with obstructive nature from his prostate.  Significant bleeding and friable prostatic tissue.  Unable to get access to his ureteral orifices bilaterally.    INDICATIONS FOR PROCEDURE:  The patient is a 72-year-old gentleman who presents with urinary retention and right-sided hydronephrosis secondary to a very tiny small distal ureteral stone.  His creatinine is elevated in spite of the May placement, and he continued to have right flank pain.  He was consented for stent placement and possible ureteroscopy with the risks of bleeding, infection, injury, need for additional surgery, stent discomfort and urosepsis.  He would like to proceed.    DESCRIPTION OF PROCEDURE:  The patient was brought to the operating room and placed in supine position.  After excellent induction of general anesthesia, his perineum was prepped and draped in the regular fashion.  A 22-Sami cystoscope was placed per his urethra.  Evaluation of the prostatic urethra demonstrated relatively small lateral lobes of the prostate.  However, there was significant enlargement of the prostate at the median lobe, with almost like infiltration coming from the prostate over the trigone area.  Some of the areas appeared to be lobulated, consistent with BPH.  Some of the areas appeared to be with caseous necrosis, which could be potentially either severe prostatitis versus cancer.  I did limited resection of the trigone area in order to identify the ureteral orifices, especially on the right side.  Unfortunately, even after my limited  resection, I could not visualize the ureteral orifices on either side.  Good hemostasis was achieved.  A 22-Kazakh, 3-way May catheter was placed, and CBI was initiated.  The plan as of now is to proceed with a right percutaneous nephrostomy placement followed by antegrade stent.  The patient will follow up on the pathology results, and he will need additional treatment based on the pathology.    ESTIMATED BLOOD LOSS:  50 mL.     Matthias Cuellar MD        D: 2023   T: 2023   MT: almita    Name:     MARGA GORDONHuy  MRN:      [id number]        Account:        [id number]   :      1950           Procedure Date: 2023     Document: [id number]

## 2023-04-26 NOTE — PLAN OF CARE
Goal Outcome Evaluation:      Plan of Care Reviewed With: patient    Overall Patient Progress: improving    Date/Time 07:00-15:30    Trauma/Ortho/Medical (Choose one) Madical    Diagnosis: Urinary retention w/hydronephrosis and hydrourerter  POD#: 3 from cystoscopy with TURP,1 from Nephrostomy tube placement, and 0 from Ureteral stent placement.  Mental Status: A&O x4.   Activity/dangle Up with SBA of 1   Diet: regular  Pain: denies  Laughlin/Voiding: laughlin with gabby color urine  Tele/Restraints/Iso: NA  02/LDA: NS infusing at 50 ml/hr  D/C Date: 4/27?  Other Info: Ureteral stent placement done today, PCN capped.

## 2023-04-26 NOTE — PROGRESS NOTES
UROLOGY BRIEF NOTE    After speaking with pharmacy, Firmagon not available inpatient.    Our clinic will be scheduling him for outpatient Firmagon administration in the coming days after discharge from hospital. Inpatient order cancelled.     Yasmin Kruse PA-C (Jackie)  [facility]  Office: [phone]

## 2023-04-26 NOTE — PRE-PROCEDURE
GENERAL PRE-PROCEDURE:   Procedure:  Right ureteral stent placement with possible nephrostomy tube removal  Date/Time:  4/26/2023 8:50 AM    Written consent obtained?: Yes    Risks and benefits: Risks, benefits and alternatives were discussed    Consent given by:  Patient  Patient states understanding of procedure being performed: Yes    Patient's understanding of procedure matches consent: Yes    Procedure consent matches procedure scheduled: Yes    Expected level of sedation:  Moderate  Appropriately NPO:  Yes  ASA Class:  2  Mallampati  :  Grade 2- soft palate, base of uvula, tonsillar pillars, and portion of posterior pharyngeal wall visible  Lungs:  Lungs clear with good breath sounds bilaterally  Heart:  Normal heart sounds and rate  History & Physical reviewed:  History and physical reviewed and no updates needed  Statement of review:  I have reviewed the lab findings, diagnostic data, medications, and the plan for sedation

## 2023-04-26 NOTE — PROGRESS NOTES
"[facility]    Urology Progress Note     Assessment & Plan   Don Venegas is a 71 yo M admitted with small right UVJ stone, calyceal fornix rupture, SHANON, and urinary retention. Dr Cuellar took to OR 4/23, unable to see UO for stent placement, limited TURP performed. CBI weaned 4/24. Now s/p R PCN placement with IR on 2/24, then antegrade R stent placement on 4/26 and PCN capped. Pathology showing high grade prostate cancer, Genny 9.     Interval History   Seen after IR procedure. Stent in, PCN left capped. Doing well. May draining clear urine. AVSS.   Pathology back this morning - showing high grade prostate cancer, Van Orin 4 + 5 = 9. Dr Cuellar discussed this with patient at bedside.     Per IR procedure note:  \"IMPRESSION: Double-J stent placed as above. Slow drainage from the  right intrarenal collecting system to the bladder through the double-J  stent, therefore, a right nephrostomy tube was left in place. The  nephrostomy tube will be capped at this time. A repeat nephrostogram  could be performed in 3-4 days. If there is satisfactory drainage  through the double-J stent, the nephrostomy tube will be removed.\"    Plan:   1. Newly diagnosed high grade prostate cancer, Van Orin 4+5=9   - Begin workup for high grade prostate cancer while here - will obtain PSA and bone scan today  and start patient on Firmagon 240mg.    - If Cr remains WNL tomorrow, will also obtain CT AP with contrast before discharge for further  cancer staging.    - Will have him follow up with Dr Dey in the coming weeks for ongoing management.     2. Right ureteral obstruction (d/t small stone vs cancer), PCN capped and antegrade stent, urinary retention.   - Will keep in hospital one additional night, check Cr in morning with PCN capped. If Cr normal, OK  to discharge with PCN capped (and OK to get contrast CT as above).    - Per IR, repeat nephrostogram on Monday as outpatient with possible tube " "removal at that time.    - Recommend 10 days oral antibiotic upon discharge    - Will discharge with ureteral stent, nephrostomy tube, and laughlin. RN to teach leg bag cares prior to  discharge tomorrow.     Case and pathology reviewed with Dr Cuellar who also spoke with patient at bedside.     Yasmin Kruse PA-C  [facility]  Pager: [phone]  Office: [phone]      OBJECTIVE:          BP (!) 149/73   Pulse 71   Temp 97.9  F (36.6  C) (Oral)   Resp 12   Ht 1.778 m (5' 10\")   Wt 68 kg (150 lb)   SpO2 99%   BMI 21.52 kg/m      Intake/Output Summary (Last 24 hours) at 4/26/2023 0952  Last data filed at 4/26/2023 0743  Gross per 24 hour   Intake --   Output 1225 ml   Net -1225 ml            General appearance shows no deformaties and good grooming in no acute distress.  EYES: no icterus  HEAD, EARS, NOSE, MOUTH, AND THROAT: atraumatic, normocephalic  CARDIAC: skin well perfused  RESPIRATORY: breathing unlabored  ABDOMEN: soft, non tender, non distended  : Laughlin draining clear urine. Dry dressing over former R PCN site.   SKIN/HAIR/NAILS: no visible rashes  NEUROLOGIC: no focal deficits  PSYCHIATRIC: Speech, mood, and affect normal      Yasmin Kruse PA-C  [facility]   Pager: [phone]  Office: [phone]    "

## 2023-04-26 NOTE — CONSULTS
Interventional Radiology - Progress Note  Inpatient - [facility]  4/26/2023    IR Brief Note    Patient known to our service from recent right PNT placement due to hytdronephrosis 2/2 obstructive stone. Re-consulted by urology for antegrade ureteral stent placement. Patient seen this AM. Denies N/V/F/C. Per RN, had some gabby colored urine overnight, currently urine in collection bag is clear yellow.    Scheduled today for antegrade right ureteral stent placement via existing access with possible nephrostomy tube removal. If tube is not removed will initiate capping trial and follow up in a couple days as in or outpatient for likely removal.     Discussed with Dr Edward who is in agreement with plan. Patient is on ceftriaxone 2g IV daily    SONIA ServinC  Interventional Radiology  [phone] (IR)  *[phone] ([place] Office)

## 2023-04-26 NOTE — PROGRESS NOTES
Cath care. May, indwelling urinary cath, leg bag and urinary catheter bag handouts printed and given to patient

## 2023-04-26 NOTE — IR NOTE
Interventional Radiology Intra-procedural Nursing Note    Patient Name: Don Venegas  Medical Record Number: [id number]  Today's Date: April 26, 2023    Procedure: Right ureteral stent placement with possible nephrostomy tube removal  Start time: 0905  End time: 0926  Report provided to: YASMEEN Rose  Patient depart time and location: 0938 to Room 2319    Note: Patient entered Interventional Radiology Suite number 2 via cart. Patient awake, alert and oriented. Assisted onto procedural table in proned position. Prepped and draped.  Dr. Edward in room. Time out and procedure started. Vital signs stable. Telemetry reading NSR.    Procedure well tolerated by patient without complications. Procedure end with debrief by Dr. Edward.  Manual pressure applied until hemostasis achieved. Gauze and tegaderm dressing applied to right interventional procedure access site, dressing is c/d/i.    Administered medication totals:  Lidocaine 1% 40 mL Intradermal  Versed 2 mg IVP  Fentanyl 100 mcg IVP    Last dose of sedation administered at 0914.

## 2023-04-26 NOTE — PROGRESS NOTES
Date/Time:04/25 ,6090-8916    Trauma/Ortho/Medical (Choose one) :Medical    Diagnosis:Urinary retention w/hydronephrosis and hydroureter  POD#:2 right PCN placement  Mental Status:A&O x 4  Activity/dangle:A-1 SBA  Diet:Reg  Pain:PRN Tylenol,oxycodone   Laughlin/Voiding:Continue laughlin, will plan to discharge with laughlin  Tele/Restraints/Iso:N/A  02/LDA:RA  D/C Date:TBD  Other Info:Nephrostomy tube patent with gabby colored fluid.  NS @50 ml/hr continuous  Possible placement of stent??

## 2023-04-27 ENCOUNTER — APPOINTMENT (OUTPATIENT)
Dept: CT IMAGING | Facility: CLINIC | Age: 73
DRG: 713 | End: 2023-04-27
Attending: STUDENT IN AN ORGANIZED HEALTH CARE EDUCATION/TRAINING PROGRAM
Payer: MEDICARE

## 2023-04-27 PROBLEM — C61 PROSTATE CANCER (H): Status: ACTIVE | Noted: 2023-04-27

## 2023-04-27 LAB
ANION GAP SERPL CALCULATED.3IONS-SCNC: 11 MMOL/L (ref 7–15)
BUN SERPL-MCNC: 10.1 MG/DL (ref 8–23)
CALCIUM SERPL-MCNC: 8.6 MG/DL (ref 8.8–10.2)
CHLORIDE SERPL-SCNC: 102 MMOL/L (ref 98–107)
CREAT SERPL-MCNC: 0.73 MG/DL (ref 0.67–1.17)
DEPRECATED HCO3 PLAS-SCNC: 26 MMOL/L (ref 22–29)
GFR SERPL CREATININE-BSD FRML MDRD: >90 ML/MIN/1.73M2
GLUCOSE SERPL-MCNC: 96 MG/DL (ref 70–99)
POTASSIUM SERPL-SCNC: 3.4 MMOL/L (ref 3.4–5.3)
SODIUM SERPL-SCNC: 139 MMOL/L (ref 136–145)

## 2023-04-27 PROCEDURE — 250N000013 HC RX MED GY IP 250 OP 250 PS 637: Performed by: UROLOGY

## 2023-04-27 PROCEDURE — 250N000011 HC RX IP 250 OP 636: Performed by: STUDENT IN AN ORGANIZED HEALTH CARE EDUCATION/TRAINING PROGRAM

## 2023-04-27 PROCEDURE — 120N000001 HC R&B MED SURG/OB

## 2023-04-27 PROCEDURE — 80048 BASIC METABOLIC PNL TOTAL CA: CPT

## 2023-04-27 PROCEDURE — 74177 CT ABD & PELVIS W/CONTRAST: CPT | Mod: MG

## 2023-04-27 PROCEDURE — 250N000013 HC RX MED GY IP 250 OP 250 PS 637: Performed by: STUDENT IN AN ORGANIZED HEALTH CARE EDUCATION/TRAINING PROGRAM

## 2023-04-27 PROCEDURE — 250N000009 HC RX 250: Performed by: STUDENT IN AN ORGANIZED HEALTH CARE EDUCATION/TRAINING PROGRAM

## 2023-04-27 PROCEDURE — 99232 SBSQ HOSP IP/OBS MODERATE 35: CPT | Performed by: HOSPITALIST

## 2023-04-27 PROCEDURE — 250N000013 HC RX MED GY IP 250 OP 250 PS 637: Performed by: HOSPITALIST

## 2023-04-27 PROCEDURE — G1010 CDSM STANSON: HCPCS

## 2023-04-27 PROCEDURE — 36415 COLL VENOUS BLD VENIPUNCTURE: CPT

## 2023-04-27 RX ORDER — IOPAMIDOL 755 MG/ML
75 INJECTION, SOLUTION INTRAVASCULAR ONCE
Status: COMPLETED | OUTPATIENT
Start: 2023-04-27 | End: 2023-04-27

## 2023-04-27 RX ADMIN — ACETAMINOPHEN 650 MG: 325 TABLET ORAL at 17:15

## 2023-04-27 RX ADMIN — SODIUM CHLORIDE 65 ML: 9 INJECTION, SOLUTION INTRAVENOUS at 10:06

## 2023-04-27 RX ADMIN — TAMSULOSIN HYDROCHLORIDE 0.4 MG: 0.4 CAPSULE ORAL at 08:18

## 2023-04-27 RX ADMIN — ACETAMINOPHEN 650 MG: 325 TABLET ORAL at 06:48

## 2023-04-27 RX ADMIN — DOCUSATE SODIUM 100 MG: 100 CAPSULE, LIQUID FILLED ORAL at 20:15

## 2023-04-27 RX ADMIN — CEFTRIAXONE SODIUM 2 G: 2 INJECTION, POWDER, FOR SOLUTION INTRAMUSCULAR; INTRAVENOUS at 17:11

## 2023-04-27 RX ADMIN — SENNOSIDES 8.6 MG: 8.6 TABLET, FILM COATED ORAL at 08:18

## 2023-04-27 RX ADMIN — DOCUSATE SODIUM 100 MG: 100 CAPSULE, LIQUID FILLED ORAL at 08:18

## 2023-04-27 RX ADMIN — POLYETHYLENE GLYCOL 3350 17 G: 17 POWDER, FOR SOLUTION ORAL at 08:18

## 2023-04-27 RX ADMIN — IOPAMIDOL 75 ML: 755 INJECTION, SOLUTION INTRAVENOUS at 10:06

## 2023-04-27 RX ADMIN — OXYCODONE HYDROCHLORIDE 5 MG: 5 TABLET ORAL at 08:22

## 2023-04-27 ASSESSMENT — ACTIVITIES OF DAILY LIVING (ADL)
ADLS_ACUITY_SCORE: 22
ADLS_ACUITY_SCORE: 26
ADLS_ACUITY_SCORE: 22
ADLS_ACUITY_SCORE: 26
ADLS_ACUITY_SCORE: 22

## 2023-04-27 NOTE — PLAN OF CARE
Goal Outcome Evaluation:      Plan of Care Reviewed With: patient    Overall Patient Progress: no change    Date/Time 2398-5899    Trauma/Ortho/Medical (Choose one) Medical    Diagnosis: Urinary retention w/hydronephrosis and hydrourerter  POD#: 4 from cystoscopy with TURP, 2 from Nephrostomy tube placement,and 1 from Ureteral stent placement.  Mental Status: A&O x4  Activity/dangle Up with SBA x1  Diet: regular. NPO at mid night   Pain: Oxycodone  Laughlin/Voiding: Via laughlin. Discharging with laughlin.  Tele/Restraints/Iso: NA  02/LDA: NS infusing at 50 ml/hr  D/C Date: 4/27 by noon.  Other Info: Radiology is working on getting neph tube remove  prior to discharging tomorrow. Dressing CDI.

## 2023-04-27 NOTE — PLAN OF CARE
Goal Outcome Evaluation:    Dx: Urine retention   Mental statues: A&O X4  VS/O2: VSS on RA  Activity: SBA  Diet: Regular   Bowel and bladder: May catheter in place  Skin: R nephrostomy tube capped, dressing CDI.  Pain: Denies   LDA: NS infusing at 50 mL/hr  Discharge: Pending

## 2023-04-27 NOTE — PROGRESS NOTES
"UROLOGY PROGRESS NOTE    April 27, 2023     HPI/SUBJECTIVE:   Patient doing okay. No interval changes, feeling similar to yesterday. Feeling tired and slightly decreased appetite. Reports moderate flank discomfort with PCN capped, but states it hurts more with changing positions and at the skin level.    Bone scan showing osseous mets to thoracic and lumbar spine  PSA = 145  Creatinine 0.73    OBJECTIVE:          /72 (BP Location: Right arm)   Pulse 72   Temp 98.2  F (36.8  C) (Oral)   Resp 16   Ht 1.778 m (5' 10\")   Wt 68 kg (150 lb)   SpO2 96%   BMI 21.52 kg/m      Intake/Output Summary (Last 24 hours) at 4/27/2023 0922  Last data filed at 4/27/2023 0630  Gross per 24 hour   Intake 960 ml   Output 2730 ml   Net -1770 ml       GENERAL: Awake alert, NAD. Recumbent in bed.   HEAD, EARS, NOSE, MOUTH, AND THROAT: atraumatic, normocephalic  NECK: symmetric  RESPIRATORY: breathing unlabored  ABDOMEN: soft, non tender, non distended  FLANKS: Right PCN capped, mild flank discomfort, no CVAT  : May draining clear urine. No SP tenderness.  NEUROLOGIC: no focal deficits  PSYCHIATRIC: Speech: normal Mood: normal Affect: normal    Imaging:    EXAM: NM BONE SCAN WHOLE BODY  LOCATION: [facility]  DATE/TIME: 4/26/2023 4:00 PM CDT     INDICATION: Malignant neoplasm of prostate  COMPARISON: CT the abdomen pelvis dated 04/22/2023.  TECHNIQUE: 28.2 mCi technetium-99m MDP, IV. Anterior and posterior delayed whole-body images at 3 hours with additional spot images of the skull.     FINDINGS: Scattered foci of radiotracer uptake throughout the lower thoracic and upper lumbar spine corresponding with subtle areas of in situ sclerosis seen on CT dated 04/22/2023 suspicious for scattered osseous metastases. Additional radiotracer   uptake seen in the cervical spine, shoulders, and knees are typical of degenerative change.                                                                  "   IMPRESSION:  Findings suspicious for scattered osseous metastases involving the lower thoracic and upper lumbar spine      ASSESSMENT:  72 year old male admitted with small right UVJ stone, calyceal fornix rupture, SHANON, and urinary retention. Dr Cuellar took to OR 4/23, unable to see UO for stent placement, limited TURP performed. CBI weaned 4/24. Now s/p R PCN placement with IR on 2/24, then antegrade R stent placement on 4/26 and PCN capped. Pathology showing high grade prostate cancer, [place] 9. Bone scan concerning for osseous mets to spine.    PLAN:    1. Newly diagnosed high grade prostate cancer, [place] 4+5=9.    - PSA = 145. Bone scan concerning for osseous mets to thoracic and lumbar spine   - Recommend starting firmagon ASAP. Unable to administer inpatient. Will plan to administer at our office within the next few days, ideally today or tomorrow pending discharge timing. Patient instructed to present to our [place] office upon discharge to receive the injection. Cancer nurse navigator, Amber Perez requesting updates from urology team with his discharge progress so she can schedule him for nurse appt.   - CT a/p with contrast today for further staging   - Planning for outpatient cancer management with Dr. Dey; preliminarily scheduled for Monday afternoon. AVS updated.       2. Right ureteral obstruction (d/t small stone vs cancer), PCN capped and antegrade stent, urinary retention.              - Discharge with PCN capped and laughlin              - Per IR, repeat nephrostogram on Monday as outpatient with possible tube removal at that time.               - Recommend 10 days oral antibiotic upon discharge               - Will discharge with ureteral stent, nephrostomy tube, and laughlin. RN to teach leg bag cares    Patient case discussed with Dr. Cuellar. Also discussed plan with YASMEEN Rose.  Ok to discharge from urology standpoint once cleared by IM.    ADDENDUM 1220: I was contacted by YASMEEN Rose  that the plan per IM is to discharge home tomorrow mid-day. I have contacted Amber, our nurse navigator, to schedule Firmagon injection early afternoon. Will update AVS with time of appt once scheduled. Discussed with Dr. Cuellar, she recommends Keflex 500 BID x 10 days upon discharge.       Brianne Mehta PA-C will be rounding for our team tomorrow    Yoana Velazquez PA-C  [facility]   Office: [phone]  During office hours you can reach me at my pager: [phone]. After 5:00 PM or on weekends, please call the office to be directed to the on-call urologist.

## 2023-04-27 NOTE — PLAN OF CARE
Goal Outcome Evaluation:    Pt A&Ox4. VSS on Rm Air. C/o 5 out of 10 pain to R flank area; PRN tylenol given w/ good effect. R nephrostomy tube capped, no drainage noted, drsg CDI, no s/s of infection. May for retention; adequate UOP. No BM during shift. Regular diet; NPO after MN for IR procedure in a.m. regarding potential removal. IV infusing 50 ml/hr. Patient ambulating in hallways w/ staff, SBA in . Discharge pending.

## 2023-04-27 NOTE — PROGRESS NOTES
"  Interventional Radiology - Progress Note  Inpatient - [facility]  4/27/2023    S:  Minimal right flank pain easily relieved with repositioning. Denies N/V/F/C. Creat stable today.      O:  /72 (BP Location: Right arm)   Pulse 72   Temp 98.2  F (36.8  C) (Oral)   Resp 16   Ht 1.778 m (5' 10\")   Wt 68 kg (150 lb)   SpO2 96%   BMI 21.52 kg/m    General:  Stable.  In no acute distress.    Neuro:  A&O x 3. Moves all extremities equally.  : Large amount of dark yellow urine in collection bag, no tenderness at right flank      Labs (Last four results)  CMPRecent Labs   Lab 04/27/23  0828 04/25/23  0758 04/24/23  0602 04/23/23  1958 04/23/23  0653   POTASSIUM 3.4 3.6  --  3.9 3.7   GLC 96 97 113* 107* 115*   BUN 10.1 14.4  --  24.0* 24.2*   CR 0.73 0.83  --  1.46* 1.51*   GFRESTIMATED >90 >90  --  51* 49*     CBC  Recent Labs   Lab 04/25/23  0758 04/23/23  0653 04/22/23  1541   WBC 13.2* 14.6* 19.0*   RBC 3.89* 4.01* 4.54   HGB 12.3* 12.7* 14.1   HCT 35.4* 36.3* 41.0    228 286     INR  Recent Labs   Lab 04/23/23  2254   INR 1.18*         A:  Obstructive uropathy s/p right PNT placement 4/24  S/P ureteral stent placement 4/26 with initiation of capping trial    P:    -Patient will not be discharged until tomorrow  -Discussed with Dr Vides, Dr Redmond. Will schedule right nephrostogram with possible PNT removal tomorrow AM prior to discontinue  -All aforementioned in agreement with plan    Rafiq Ingram PA-C  Interventional Radiology  *[id number]  [phone]      Total time today: 15 minutes    "

## 2023-04-27 NOTE — PROGRESS NOTES
[facility]    Medicine Progress Note - Hospitalist Service    Date of Admission:  4/22/2023    Assessment & Plan   Don Venegas is a 72 year old male with limited past medical history admitted on 4/22/2023  with acute kidney injury (SHANON), urinary retention, and abnormal CT imaging - see report.  Newly diagnosed prostate cancer, high grade.    Newly diagnosed high grade prostate cancer  Possible acute pyelonephritis  Right hydronephrosis/hydroureter with tiny calcification near the right UVJ on CT 4/22  Retroperitoneal edema and fluid surrounding right kidney suggests calyceal fornix rupture on CT 4/22  S/p cystoscopy, limited transurethral resection of prostate (TURP) 4/23/23  S/p Right nephrostomy tube placement with IR, 4/24/23  Acute kidney injury (SHANON)   Benign prostatic hypertrophy (BPH)  Urinary retention with obstruction  CT scan abdomen/pelvis 4/22/23, see report.  Bone scan 4/26, findings suspicious for scattered osseous metastases involving lower thoracic and upper lumbar spine, see report.  CT abdomen/pelvis 4/27 with retrograde peritoneal adenopathy, new percutaneous nephrostomy and ureteral stent on the right, and additional findings, see report.  - Urology follow-up, note 4/27; bone scan 4/26, CT abdomen/pelvis 4/27 for staging of new prostate cancer; continue May on discharge; follow-up with Dr. Dey as outpatient (?next Monday); dosing of firmagon as per urology, possibly 4/28 after hospital discharge, urology to arrange   - IR consult follow-up, note 4/26, s/p Double-J stent placement; future repeat nephrostogram next week (Monday) as outpatient with possible tube removal at that time (urology note 4/26); Dr. Dey to review  - 10 days oral antibiotic on discharge, urology to review choice (currently on IV ceftriaxone), reassess 4/28  - AM labs reviewed, satisfactory     Constipation  - bowel medications ordered 4/24, continue to monitor, reassess  daily    Disposition   - estimated length of stay 24 hours, tentative discharge 4/28 with urology follow-up   - anticipated discharge to home; social work consulted     Diet: Regular Diet Adult    DVT Prophylaxis: Pneumatic Compression Devices  May Catheter: PRESENT, indication: Retention, Retention  Lines: None     Cardiac Monitoring: None  Code Status: Full Code      Clinically Significant Risk Factors              # Hypoalbuminemia: Lowest albumin = 3.4 g/dL at 4/23/2023  6:53 AM, will monitor as appropriate                   Disposition Plan      Expected Discharge Date: 04/28/2023      Destination: home            Alexandre Redmond MD  Hospitalist Service  [facility]  Securely message with [employer] (more info)  Text page via [employer] Paging/Directory   ______________________________________________________________________    Interval History   Lying in bed, CT scan earlier today, bone scan yesterday.  Newly diagnosed prostate cancer.  Tentative plans for discharge tomorrow.  Pain controlled.    Physical Exam   Vital Signs: Temp: 98.2  F (36.8  C) Temp src: Oral BP: 129/72 Pulse: 72   Resp: 16 SpO2: 96 % O2 Device: None (Room air)    Weight: 150 lbs 0 oz    GENERAL awake and alert, no acute distress   LUNGS clear  HEART S1, S2 regular rate and rhythm; no rubs or gallops  ABDOMEN soft, nontender to palpation; no guarding, rebound, or rigidity  MUSCULOSKELETAL extremities without edema  NEURO moves upper and lower extremities spontaneously and to command  MENTAL STATUS answering questions and following simple commands, normal    Medical Decision Making             Data     I have personally reviewed the following data over the past 24 hrs:    N/A  \   N/A   / N/A     139 102 10.1 /  96   3.4 26 0.73 \       Imaging results reviewed over the past 24 hrs:   Recent Results (from the past 24 hour(s))   NM Bone Scan Whole Body    Narrative    EXAM: NM BONE SCAN WHOLE BODY  LOCATION: [facility]  DATE/TIME: 4/26/2023 4:00 PM CDT    INDICATION: Malignant neoplasm of prostate  COMPARISON: CT the abdomen pelvis dated 04/22/2023.  TECHNIQUE: 28.2 mCi technetium-99m MDP, IV. Anterior and posterior delayed whole-body images at 3 hours with additional spot images of the skull.    FINDINGS: Scattered foci of radiotracer uptake throughout the lower thoracic and upper lumbar spine corresponding with subtle areas of in situ sclerosis seen on CT dated 04/22/2023 suspicious for scattered osseous metastases. Additional radiotracer   uptake seen in the cervical spine, shoulders, and knees are typical of degenerative change.       Impression    IMPRESSION:    Findings suspicious for scattered osseous metastases involving the lower thoracic and upper lumbar spine   CT Abdomen Pelvis w Contrast    Narrative    CT ABDOMEN AND PELVIS WITH CONTRAST 4/27/2023 10:18 AM    CLINICAL HISTORY: Abdominal pain. Staging for new diagnosis advanced  stage prostate cancer.    TECHNIQUE: CT scan of the abdomen and pelvis was performed following  injection of IV contrast. Multiplanar reformats were obtained. Dose  reduction techniques were used.  CONTRAST: 75mL [id number]    COMPARISON: April 22, 2023    FINDINGS:   LOWER CHEST: New minimal right and trace left pleural effusions with  associated compressive atelectasis and/or infiltrate.    HEPATOBILIARY: No significant mass or bile duct dilatation. No  calcified gallstones.     PANCREAS: No significant mass, duct dilatation, or inflammatory  change.    SPLEEN: Normal size.    ADRENAL GLANDS: No significant nodules.    KIDNEYS/BLADDER: New percutaneous nephrostomy and ureteral stent on  the right with resolution of the previously seen hydronephrosis. Mild  left hydronephrosis persists. No renal mass.    BOWEL: No obstruction or inflammatory change.    PELVIC ORGANS: Prostatic tissue may invade the bladder floor.    ADDITIONAL FINDINGS: Retroperitoneal adenopathy  evident with a  discrete aortocaval node measuring 2.5 x 1.5 cm. Additional  ill-defined and smaller nodes noted.    MUSCULOSKELETAL: Tiny sclerotic foci in both femoral heads may be bone  islands. Incompletely seen sclerosis in T10 could represent a bony  metastasis. Please see bone scan results for further evaluation of  bony lesions.      Impression    IMPRESSION:   1.  Retrograde peritoneal adenopathy compatible with metastatic  disease.  2.  New percutaneous nephrostomy and ureteral stent on the right with  resolution of right hydronephrosis. Similar left hydronephrosis.  3.  New minimal right and trace left effusions with associated  compressive atelectasis and/or infiltrate.

## 2023-04-28 ENCOUNTER — APPOINTMENT (OUTPATIENT)
Dept: INTERVENTIONAL RADIOLOGY/VASCULAR | Facility: CLINIC | Age: 73
DRG: 713 | End: 2023-04-28
Attending: PHYSICIAN ASSISTANT
Payer: MEDICARE

## 2023-04-28 VITALS
TEMPERATURE: 98.8 F | BODY MASS INDEX: 21.47 KG/M2 | OXYGEN SATURATION: 98 % | HEART RATE: 78 BPM | DIASTOLIC BLOOD PRESSURE: 61 MMHG | HEIGHT: 70 IN | SYSTOLIC BLOOD PRESSURE: 127 MMHG | RESPIRATION RATE: 16 BRPM | WEIGHT: 150 LBS

## 2023-04-28 PROCEDURE — 50431 NJX PX NFROSGRM &/URTRGRM: CPT

## 2023-04-28 PROCEDURE — C1769 GUIDE WIRE: HCPCS

## 2023-04-28 PROCEDURE — 0TP570Z REMOVAL OF DRAINAGE DEVICE FROM KIDNEY, VIA NATURAL OR ARTIFICIAL OPENING: ICD-10-PCS | Performed by: RADIOLOGY

## 2023-04-28 PROCEDURE — 250N000013 HC RX MED GY IP 250 OP 250 PS 637: Performed by: HOSPITALIST

## 2023-04-28 PROCEDURE — 99239 HOSP IP/OBS DSCHRG MGMT >30: CPT | Performed by: HOSPITALIST

## 2023-04-28 PROCEDURE — 250N000013 HC RX MED GY IP 250 OP 250 PS 637: Performed by: STUDENT IN AN ORGANIZED HEALTH CARE EDUCATION/TRAINING PROGRAM

## 2023-04-28 RX ORDER — DOCUSATE SODIUM 100 MG/1
100 CAPSULE, LIQUID FILLED ORAL 2 TIMES DAILY
Qty: 60 CAPSULE | Refills: 0 | Status: SHIPPED | OUTPATIENT
Start: 2023-04-28 | End: 2023-06-22

## 2023-04-28 RX ORDER — CEPHALEXIN 500 MG/1
500 CAPSULE ORAL 2 TIMES DAILY
Qty: 20 CAPSULE | Refills: 0 | Status: SHIPPED | OUTPATIENT
Start: 2023-04-28 | End: 2023-06-22

## 2023-04-28 RX ORDER — CEPHALEXIN 500 MG/1
500 CAPSULE ORAL 2 TIMES DAILY
Status: DISCONTINUED | OUTPATIENT
Start: 2023-04-28 | End: 2023-04-28 | Stop reason: HOSPADM

## 2023-04-28 RX ORDER — TAMSULOSIN HYDROCHLORIDE 0.4 MG/1
0.4 CAPSULE ORAL DAILY
Qty: 30 CAPSULE | Refills: 0 | Status: SHIPPED | OUTPATIENT
Start: 2023-04-28 | End: 2023-10-20

## 2023-04-28 RX ADMIN — TAMSULOSIN HYDROCHLORIDE 0.4 MG: 0.4 CAPSULE ORAL at 11:29

## 2023-04-28 RX ADMIN — DOCUSATE SODIUM 100 MG: 100 CAPSULE, LIQUID FILLED ORAL at 11:30

## 2023-04-28 RX ADMIN — ACETAMINOPHEN 650 MG: 325 TABLET ORAL at 11:37

## 2023-04-28 RX ADMIN — CEPHALEXIN 500 MG: 500 CAPSULE ORAL at 11:29

## 2023-04-28 ASSESSMENT — ACTIVITIES OF DAILY LIVING (ADL)
ADLS_ACUITY_SCORE: 22

## 2023-04-28 NOTE — CARE PLAN
Pt is A & O x 4. Lungs sound clear, bowel sounds active, cms intact. Up with SBA. Went to IR today for right Neph tube removal, May in place. May and leg bag teaching done.received tylenol for pian. Discharge instructions and meds were reviewed. Pt to go to urology clinic when going home and to [facility] to  meds. Was discharged home.

## 2023-04-28 NOTE — PROGRESS NOTES
Pt is A&O x4. Up with stand by assist. NPO from mid night for IR procedure this morning. May in place and patent. Nephrostomy tube in place, dressing CDI. On continuous NS at 50 ml/hr.

## 2023-04-28 NOTE — PROGRESS NOTES
[facility]    Urology Progress Note     Assessment & Plan   72 year old male admitted with small right UVJ stone, calyceal fornix rupture, SHANON, and urinary retention. Dr Cuellar took to OR 4/23, unable to see UO for stent placement, limited TURP performed. CBI weaned 4/24. Now s/p R PCN placement with IR on 2/24, then antegrade R stent placement on 4/26 and PCN capped. Pathology showing high grade prostate cancer, [place] 9. Bone scan concerning for osseous mets to spine.    Plan:   1. Newly diagnosed high grade prostate cancer, [place] 4+5=9.               - PSA = 145. Bone scan concerning for osseous mets to thoracic and lumbar spine              - Recommend starting firmagon ASAP. Unable to administer inpatient. Will plan to administer at our office today, after hospital discharge. Patient instructed to present to our [place] office upon discharge to receive the injection.              - Planning for outpatient cancer management with Dr. Dey; preliminarily scheduled for Monday afternoon. AVS updated.       2. Right ureteral obstruction (d/t small stone vs cancer), PCN capped and antegrade stent, urinary retention.              - PCN removed this AM by IR               - Recommend 10 days oral antibiotic upon discharge               - Will discharge with ureteral stent and laughlin. RN to teach leg bag cares    Brianne Mehta PA-C  [facility]   [email or web address]  Text Page (7:30am to 4:00pm)      Interval History   Feeling well this AM, planning to discharge   IR removed PCN earlier today   Urine is clear with laughlin    AVSS    Physical Exam   Temp: 98.8  F (37.1  C) Temp src: Oral BP: 127/61 Pulse: 78   Resp: 16 SpO2: 98 % O2 Device: None (Room air)    Vitals:    04/22/23 1213   Weight: 68 kg (150 lb)     Vital Signs with Ranges  Temp:  [97.6  F (36.4  C)-98.8  F (37.1  C)] 98.8  F (37.1  C)  Pulse:  [70-78] 78  Resp:  [16-18] 16  BP: (121-139)/(54-73)  127/61  SpO2:  [96 %-98 %] 98 %  I/O last 3 completed shifts:  In: 480 [P.O.:480]  Out: 1450 [Urine:1450]    GENERAL: Awake alert, NAD. Recumbent in bed.   HEAD, EARS, NOSE, MOUTH, AND THROAT: atraumatic, normocephalic  NECK: symmetric  RESPIRATORY: breathing unlabored  ABDOMEN: soft, non tender, non distended  FLANKS: no CVAT bilaterally   : May draining clear urine. No SP tenderness.  NEUROLOGIC: no focal deficits  PSYCHIATRIC: Speech: normal Mood: normal Affect: normal    Medications     sodium chloride 50 mL/hr at 04/26/23 1937       cefTRIAXone  2 g Intravenous Q24H     docusate sodium  100 mg Oral BID     sodium chloride (PF)  3 mL Intracatheter Q8H     tamsulosin  0.4 mg Oral Daily       Data   No results found for this or any previous visit (from the past 24 hour(s)).

## 2023-04-28 NOTE — IR NOTE
Interventional Radiology Intra-procedural Nursing Note    Patient Name: Don Venegas  Medical Record Number: [id number]  Today's Date: April 28, 2023    Procedure: right nephrostogram with nephrostomy tube removal  Start time: 0811  End time: 0815  Report provided to: ortho RN  Patient depart time and location: [phone]    Note: Patient entered Interventional Radiology Suite number 2 via cart. Patient awake, alert and oriented. Assisted onto procedural table in prone position. Prepped and draped.  Dr. Vides in room. Time out and procedure started.     Procedure well tolerated by patient without complications. Procedure end with debrief by Dr Vides.

## 2023-04-28 NOTE — DISCHARGE SUMMARY
[facility]  Hospitalist Discharge Summary      Date of Admission:  4/22/2023  Date of Discharge:  4/28/2023  Discharging Provider: Alexandre Redmond MD  Discharge Service: Hospitalist Service    Discharge Diagnoses   1. Newly diagnosed high grade prostate cancer  2. Possible acute pyelonephritis  3. Right hydronephrosis/hydroureter with tiny calcification near the right UVJ on CT 4/22  4. Retroperitoneal edema and fluid surrounding right kidney suggests calyceal fornix rupture on CT 4/22  5. S/p cystoscopy, limited transurethral resection of prostate (TURP) 4/23/23  6. S/p Right nephrostomy tube placement with IR, 4/24/23, removed 4/28/23  7. Acute kidney injury (SHANON), resolving  8. Benign prostatic hypertrophy (BPH)  9. Urinary retention with obstruction  10. Constipation    Follow-ups Needed After Discharge   Follow-up Appointments     Follow Up ([facility])      Please go directly to [facility] office in [place] for Firmagon   injection upon your hospital discharge. You are also scheduled for an   prostate cancer consult with Dr. Dey on Monday, 5/1/23 at 2:30 PM.    [facility]  [address] [place] [address]  Appointment scheduling: [phone]         Follow-up and recommended labs and tests       Follow-up with urology next week as arranged.             Unresulted Labs Ordered in the Past 30 Days of this Admission     No orders found from 3/23/2023 to 4/23/2023.      These results will be followed up by Urology, Dr. Dey.    Discharge Disposition   Discharged to home  Condition at discharge: Stable    Hospital Course   Don Venegas is a 72 year old male with limited past medical history admitted on 4/22/2023  with acute kidney injury (SHANON), urinary retention, and abnormal CT imaging - see report.  Newly diagnosed prostate cancer, high grade.  For details, see admission note.  CT scan abdomen pelvis, see report.  Urology consulted.  Underwent  surgery with urology 4/23/2023.  Newly diagnosed prostate cancer.  Interventional radiology consulted.  Right nephrostomy tube placement with subsequent removal prior to discharge.  IV antibiotics in hospital, transition to oral on discharge with outpatient urology follow-up.  See hospital record for additional details regarding hospital course.  Patient will call or seek medical attention if any worrisome signs or symptoms develop post discharge.  Follow-up with urology as arranged.    Consultations This Hospital Stay   UROLOGY IP CONSULT  INTERVENTIONAL RADIOLOGY ADULT/PEDS IP CONSULT  INTERVENTIONAL RADIOLOGY ADULT/PEDS IP CONSULT  INTERVENTIONAL RADIOLOGY ADULT/PEDS IP CONSULT    Code Status   Full Code    Time Spent on this Encounter   I, Alexandre Redmond MD, personally saw the patient today and spent greater than 30 minutes discharging this patient.       Alexandre Redmond MD  [facility]  [address] [place] [id number]  Phone: [phone]  Fax: [phone]  ______________________________________________________________________    Physical Exam   Vital Signs: Temp: 98.8  F (37.1  C) Temp src: Oral BP: 127/61 Pulse: 78   Resp: 16 SpO2: 98 % O2 Device: None (Room air)    Weight: 150 lbs 0 oz    Awake and alert, in good spirits, lying in bed excited to be discharged home  Lungs clear  Heart regular rhythm without rubs or gallops  Abdomen soft and nontender without rebound or rigidity  Mental status normal       Primary Care Physician   Physician No Ref-Primary    Discharge Orders      Discharge Instructions - Comfort and Pain Management    Pain after surgery is normal and expected. You will have some amount of pain after surgery. Your pain will improve with time. There are several things you can do to help reduce your pain including: rest, ice, and using pain medications as needed. Use pain interventions and don't wait until pain level is out of control. Contact your  Surgeon Team if you have pain that persists or worsens after surgery despite rest, ice, and taking your medication(s) as prescribed. You may have a dry mouth, a sore throat, muscles aches or trouble sleeping, and these symptoms should go away after 24 hours.     Discharge Instructions - Rest    Rest and relax for the next 24 hours. Make arrangements to have someone stay with you overnight, and avoid hazardous and strenuous activities.  Do NOT make any important decisions for the next 24 hours.     Follow Up ([facility])    Please go directly to [facility] office in [place] for Firmagon injection upon your hospital discharge. You are also scheduled for an prostate cancer consult with Dr. Dey on Monday, 5/1/23 at 2:30 PM.    [facility]  [address] [place] [address]  Appointment scheduling: [phone]     Reason for your hospital stay    Right-sided urinary obstruction, newly diagnosed prostate cancer with recent surgery performed     Follow-up and recommended labs and tests     Follow-up with urology next week as arranged.     Activity    Your activity upon discharge: activity as tolerated, advance slowly     Diet    Follow this diet upon discharge: Orders Placed This Encounter      Regular Diet Adult       Significant Results and Procedures   Most Recent 3 CBC's:Recent Labs   Lab Test 04/25/23  0758 04/23/23  0653 04/22/23  1541   WBC 13.2* 14.6* 19.0*   HGB 12.3* 12.7* 14.1   MCV 91 91 90    228 286     Most Recent 3 BMP's:Recent Labs   Lab Test 04/27/23  0828 04/25/23  0758 04/24/23  0602 04/23/23  1958    140  --  140   POTASSIUM 3.4 3.6  --  3.9   CHLORIDE 102 105  --  105   CO2 26 26  --  21*   BUN 10.1 14.4  --  24.0*   CR 0.73 0.83  --  1.46*   ANIONGAP 11 9  --  14   MIKY 8.6* 8.6*  --  8.5*   GLC 96 97 113* 107*   ,   Results for orders placed or performed during the hospital encounter of 04/22/23   CT Abdomen Pelvis w/o Contrast    Narrative    EXAM: CT ABDOMEN  PELVIS W/O CONTRAST  LOCATION: [facility]  DATE/TIME: 4/22/2023 6:49 PM CDT    INDICATION: Patient has acute urinary retention with acute renal failure and pyelonephritis.  COMPARISON: None.  TECHNIQUE: CT scan of the abdomen and pelvis was performed without IV contrast. Multiplanar reformats were obtained. Dose reduction techniques were used.  CONTRAST: None.    FINDINGS:   LOWER CHEST: Normal.    HEPATOBILIARY: Normal.    PANCREAS: Normal.    SPLEEN: Normal.    ADRENAL GLANDS: Normal.    KIDNEYS/BLADDER: Moderate right hydronephrosis and hydroureter. There is a 2 mm calcification in the region of the UPJ on series 4 image 163 which is favored to represent a UPJ stone. There is abundant soft tissue stranding and modest retroperitoneal   fluid adjacent to right kidney suggesting calyceal fornix rupture.    Left kidney within normal limits.  May catheter present with bladder empty.    BOWEL: No obstruction or inflammatory change. Appendix normal.    LYMPH NODES: There are several mildly prominent periaortic nodes, largest is aortocaval node measuring 2 x 1.2 cm on image 71    VASCULATURE: Unremarkable.    PELVIC ORGANS: Normal.    MUSCULOSKELETAL: Small bilateral inguinal hernias right side contains a short loop of nonobstructed small bowel.      Impression    IMPRESSION:   1.  Right hydronephrosis/hydroureter with a tiny calcification near the right UVJ and this is favored to represent a small UVJ stone.  2.  Retroperitoneal edema and fluid surrounding right kidney suggests calyceal fornix rupture.     IR Nephrostomy Tube Placement Right    Narrative    INTERVENTIONAL RADIOLOGY NEPHROSTOMY TUBE PLACEMENT RIGHT 4/24/2023  9:10 AM    PROCEDURE(S):  1. Right antegrade nephrostogram  2. Placement of 10.2 Japanese percutaneous nephrostomy tube    DATE OF PROCEDURE: 4/24/2023.    MODERATE SEDATION: Versed 2 mg IV; Fentanyl 100 mcg IV. During the  time out, immediately prior to the administration  "of medications, the  patient was reassessed for adequacy to receive conscious sedation.   Under physician supervision, Versed and fentanyl were administered for  moderate sedation. Pulse oximetry, heart rate and blood pressure were  continuously monitored by an independent trained observer. The  physician spent 7 minutes of face-to-face sedation time with the  patient.    CONTRAST: 10 mL Isovue 300 into the renal collecting system    FLUOROSCOPY TIME: 0.5 minutes    AIR KERMA: 3.42 mGy.    COMPLICATIONS: None    CLINICAL HISTORY/INDICATION: Hydronephrosis from obstructing prostate.    PROCEDURES AND FINDINGS:  Following a discussion of the risks, benefits, indications and  alternatives to treatment, appropriate informed consent was obtained.    The patient was brought to the interventional radiology suite and  placed on the table. The patient's right flank was prepped and draped  in a sterile fashion. A time out was performed per universal protocol  policy to ensure correct patient, site and procedure to be performed.     A preliminary ultrasound of the right flank was performed which  demonstrates moderate hydronephrosis.  Ultrasound images were  archived.  Then, under ultrasound guidance an appropriate site was  marked for needle placement and this region was subsequently  infiltrated with 1% Lidocaine for local anesthesia. Under direct  ultrasound guidance, a 21-gauge needle was inserted into the lower  pole of the dilated calyx until urine return was noted.  A small  amount of contrast was then injected which demonstrates moderate  hydronephrosis and proximal hydroureter. Contrast is seen flowing into  the proximal, mid and distal ureter but not into the bladder.  A  0.018\" wire was then advanced through the needle and coiled within the  renal pelvis. The needle was then exchanged for a coaxial AccuStick  set, which was advanced over the wire through the calyx into the renal  pelvis. The 0.018\" wire was placed " "to the side as a safety wire and a  0.035\" guidewire was advanced into the renal pelvis. The tract was  serially dilated with final placement of a 10.2 Ivorian nephrostomy  tube with the tip coiled in the renal pelvis. Injection of contrast  demonstrates the pigtail centered in the renal pelvis.  The contrast  was aspirated and the tube was placed to gravity bag drainage. The  catheter was secured to the skin using a 2-0 silk and a sterile  dressing was applied.      Throughout the procedure, the patient was monitored by a radiology  nurse for cardiac rhythm and oxygen saturation which remained stable.  The patient tolerated the procedure well and left interventional  radiology in stable condition.      Impression    IMPRESSION:  1. Antegrade nephrostogram demonstrating moderate hydronephrosis and  hydroureter. No contrast is seen flowing into the bladder  2. Successful placement of a 10.2 Ivorian percutaneous nephrostomy  tube, as detailed above.      THUY QUILES DO         SYSTEM ID:  [id number]   IR Ureteral Stent Placement Right    Narrative    IR URETERAL STENT PLACEMENT RIGHT  4/26/2023 9:27 AM     HISTORY: Patient had a nephrostomy tube placed for a right renal  obstruction on 4/24/2023. Patient presents today for internalization  of the nephrostomy tube for a double-J stent.    COMPARISON: 4/24/2023    FINDINGS: After obtaining informed consent, the patient was placed in  a prone position on the fluoroscopy table. The skin entry site and  external portions of existing nephrostomy tube were prepped and draped  in the usual sterile manner. 1% lidocaine was injected for local  anesthesia. Nephrostogram was performed. This showed prominence of the  right intrarenal collecting system with drainage of contrast into the  distal ureter but no emptying into the bladder.    Intervention: The existing nephrostomy tube was removed over a  Glidewire. The Glidewire was manipulated into the distal ureter with  the aid " of a 5 Bahraini Dunne catheter. The catheter wire  combination were then able to be passed across an area of narrowing in  the distal right ureter. A Super Stiff Amplatz wire was placed. Over  the wire, a 10 Bahraini by 26 cm double J stent was placed. A repeat  nephrostogram was performed. There was slow emptying of the right  intrarenal collecting system through the double-J stent therefore, it  was elected to leave behind a 10 Bahraini nephrostomy tube. A  fluoroscopic image was saved to document catheter positions.    I determined this patient to be an appropriate candidate for the  planned sedation and procedure and reassessed the patient immediately  prior to sedation and procedure. Moderate intravenous conscious  sedation was supervised by me. The patient was independently monitored  by a registered nurse assigned to the Department of radiology using  automated blood pressure, EKG and pulse oximetry. The patient  tolerated the procedure well. There were no immediate postprocedure  complications. The patient's vital signs were monitored by radiology  nursing staff under my supervision and remained stable throughout the  study. Radiation dose for this scan was reduced using automated  exposure control, adjustment of the mA and/or kV according to patient  size, or iterative reconstruction technique.    MEDICATIONS: Versed 2 mg, fentanyl 100 mcg    Sedation time: 29 minutes    Fluoroscopy time: 3.7 minutes    Total fluoroscopy dose: 12.3 mGy Air Kerma    Contrast: 30 mL Isovue-300    Local anesthetic: 40 mL      Impression    IMPRESSION: Double-J stent placed as above. Slow drainage from the  right intrarenal collecting system to the bladder through the double-J  stent, therefore, a right nephrostomy tube was left in place. The  nephrostomy tube will be capped at this time. A repeat nephrostogram  could be performed in 3-4 days. If there is satisfactory drainage  through the double-J stent, the nephrostomy tube  will be removed.    ANNIKA TAM MD         SYSTEM ID:  [id number]   NM Bone Scan Whole Body    Narrative    EXAM: NM BONE SCAN WHOLE BODY  LOCATION: [facility]  DATE/TIME: 4/26/2023 4:00 PM CDT    INDICATION: Malignant neoplasm of prostate  COMPARISON: CT the abdomen pelvis dated 04/22/2023.  TECHNIQUE: 28.2 mCi technetium-99m MDP, IV. Anterior and posterior delayed whole-body images at 3 hours with additional spot images of the skull.    FINDINGS: Scattered foci of radiotracer uptake throughout the lower thoracic and upper lumbar spine corresponding with subtle areas of in situ sclerosis seen on CT dated 04/22/2023 suspicious for scattered osseous metastases. Additional radiotracer   uptake seen in the cervical spine, shoulders, and knees are typical of degenerative change.       Impression    IMPRESSION:    Findings suspicious for scattered osseous metastases involving the lower thoracic and upper lumbar spine   CT Abdomen Pelvis w Contrast    Narrative    CT ABDOMEN AND PELVIS WITH CONTRAST 4/27/2023 10:18 AM    CLINICAL HISTORY: Abdominal pain. Staging for new diagnosis advanced  stage prostate cancer.    TECHNIQUE: CT scan of the abdomen and pelvis was performed following  injection of IV contrast. Multiplanar reformats were obtained. Dose  reduction techniques were used.  CONTRAST: 75mL ISOVUE-370    COMPARISON: April 22, 2023    FINDINGS:   LOWER CHEST: New minimal right and trace left pleural effusions with  associated compressive atelectasis and/or infiltrate.    HEPATOBILIARY: No significant mass or bile duct dilatation. No  calcified gallstones.     PANCREAS: No significant mass, duct dilatation, or inflammatory  change.    SPLEEN: Normal size.    ADRENAL GLANDS: No significant nodules.    KIDNEYS/BLADDER: New percutaneous nephrostomy and ureteral stent on  the right with resolution of the previously seen hydronephrosis. Mild  left hydronephrosis persists. No renal  mass.    BOWEL: No obstruction or inflammatory change.    PELVIC ORGANS: Prostatic tissue may invade the bladder floor.    ADDITIONAL FINDINGS: Retroperitoneal adenopathy evident with a  discrete aortocaval node measuring 2.5 x 1.5 cm. Additional  ill-defined and smaller nodes noted.    MUSCULOSKELETAL: Tiny sclerotic foci in both femoral heads may be bone  islands. Incompletely seen sclerosis in T10 could represent a bony  metastasis. Please see bone scan results for further evaluation of  bony lesions.      Impression    IMPRESSION:   1.  Retroperitoneal adenopathy compatible with metastatic disease.  2.  New percutaneous nephrostomy and ureteral stent on the right with  resolution of right hydronephrosis. Similar left hydronephrosis.  3.  New minimal right and trace left effusions with associated  compressive atelectasis and/or infiltrate.    ADA BOWERS MD         SYSTEM ID:  [id number]   IR Nephrostomy Tube Check Right    Narrative    PROCEDURE(S):  1. Right antegrade nephrostogram  2. Right percutaneous nephrostomy tube removal    DATE OF PROCEDURE: 4/28/2023.    MEDICATIONS: None.    CONTRAST: 15 mL Isovue 300 into the renal collecting system    FLUOROSCOPY TIME: 0.4 minutes    AIR KERMA: 3 mGy    COMPLICATIONS: None.    CLINICAL HISTORY/INDICATION: Tapping trial initiated on 4/26/2023.  Patient with ureteral obstruction a double-J ureteral stent in place.  Nephrostomy tube also in place. Check for possible nephrostomy tube  removal.    PROCEDURES AND FINDINGS: Following a discussion of the risks,  benefits, indications and alternatives to treatment, appropriate  informed consent was obtained. The patient was brought to the  interventional radiology suite and placed prone on the table. The  patient's right flank, including the indwelling nephrostomy tube, was  prepped and draped in usual sterile fashion. A time out was performed  per universal protocol policy to ensure correct patient, site and  procedure to  "be performed.     1% Lidocaine was used for local anesthetic. A preliminary   radiograph was performed and demonstrates a locking loop catheter in  the right flank. Contrast was injected through the indwelling right  nephrostomy tube. Diagnostic antegrade nephrostogram demonstrates  pigtail catheter is in place. There is also a double-J ureteral stent.  Images show no hydronephrosis or hydroureter. Contrast flows easily  down the ureter into the bladder. The catheter retention suture was  cut and a 0.035\" guidewire was advanced into and coiled within the  renal pelvis. The indwelling nephrostomy tube was then removed over  the wire. A sterile dressing was applied. The tube was placed to  gravity bag drainage.    Throughout the procedure, the patient was monitored by a radiology  nurse for cardiac rhythm and oxygen saturation which remained stable.  The patient tolerated the procedure well and left interventional  radiology in stable condition.      Impression    IMPRESSION: Right antegrade nephrostogram shows contrast flows easily  down the ureter into the bladder via the double-J ureteral stent. The  right nephrostomy tube was removed over a wire.             Discharge Medications   Current Discharge Medication List      START taking these medications    Details   acetaminophen (TYLENOL) 325 MG tablet Take 2 tablets (650 mg) by mouth every 4 hours as needed for other (mild pain)  Qty: 100 tablet, Refills: 0    Associated Diagnoses: Urinary retention      cephALEXin (KEFLEX) 500 MG capsule Take 1 capsule (500 mg) by mouth 2 times daily  Qty: 20 capsule, Refills: 0    Associated Diagnoses: Complicated UTI (urinary tract infection)      docusate sodium (COLACE) 100 MG capsule Take 1 capsule (100 mg) by mouth 2 times daily Hold if loose stools (for constipation)  Qty: 60 capsule, Refills: 0    Associated Diagnoses: Constipation, unspecified constipation type      hydrOXYzine (ATARAX) 10 MG tablet Take 1 tablet (10 " mg) by mouth every 6 hours as needed for itching or anxiety (with pain, moderate pain)  Qty: 30 tablet, Refills: 0    Associated Diagnoses: Urinary retention      ondansetron (ZOFRAN ODT) 4 MG ODT tab Take 1-2 tablets (4-8 mg) by mouth every 8 hours as needed for nausea Dissolve ON the tongue.  Qty: 10 tablet, Refills: 0    Associated Diagnoses: Urinary retention      tamsulosin (FLOMAX) 0.4 MG capsule Take 1 capsule (0.4 mg) by mouth daily Future refills to Urology, Dr. Dey  Qty: 30 capsule, Refills: 0    Associated Diagnoses: Urinary retention; Prostate cancer (H)         CONTINUE these medications which have NOT CHANGED    Details   NONFORMULARY Patient takes a mens herbal supplement daily which includes saw palmetto.           Allergies   Allergies   Allergen Reactions     Gluten Meal

## 2023-05-03 ENCOUNTER — HOSPITAL ENCOUNTER (EMERGENCY)
Facility: CLINIC | Age: 73
Discharge: HOME | End: 2023-05-03
Attending: EMERGENCY MEDICINE | Admitting: EMERGENCY MEDICINE
Payer: MEDICARE

## 2023-05-03 VITALS
WEIGHT: 150 LBS | TEMPERATURE: 97.6 F | OXYGEN SATURATION: 94 % | RESPIRATION RATE: 18 BRPM | DIASTOLIC BLOOD PRESSURE: 68 MMHG | BODY MASS INDEX: 21.52 KG/M2 | HEART RATE: 74 BPM | SYSTOLIC BLOOD PRESSURE: 120 MMHG

## 2023-05-03 DIAGNOSIS — R33.9 URINARY RETENTION: ICD-10-CM

## 2023-05-03 PROCEDURE — 51702 INSERT TEMP BLADDER CATH: CPT

## 2023-05-03 PROCEDURE — 51798 US URINE CAPACITY MEASURE: CPT

## 2023-05-03 PROCEDURE — 99284 EMERGENCY DEPT VISIT MOD MDM: CPT | Mod: 25

## 2023-05-03 NOTE — ED NOTES
Patient bladder scanned. 74 ml present upon scanning his bladder. Patient also complains of pain at the tip of his penis.

## 2023-05-03 NOTE — ED PROVIDER NOTES
History     Chief Complaint:  Catheter Problem       HPI   Don Venegas is a 72 year old male with a history of prostate cancer and urinary retention who presents with bladder fullness. He has a laughlin catheter for prostate cancer for the past two weeks. He was going to have it removed this morning at urology clinic, but he did not pass, so it was reinserted at around 0900. Since then, the bag has not been draining as much, and he is experiencing bladder fullness. He has not noticed any leaking around the catheter.    Independent Historian:   Patient    Review of External Notes: None     ROS:  Review of Systems   Genitourinary: Positive for decreased urine volume (in bag).   All other systems reviewed and are negative.      Allergies:  Gluten Meal     Medications:    Keflex  Colace  Flomax    Past Medical History:    Prostate cancer  SHANON  Pyelonephritis  Urinary retention    Past Surgical History:    Cystoscopy  Nephrostomy tube placement, right  Ureteral stent placement, right     Social History:  Presents via private vehicle.  PCP: No Ref-Primary, Physician     Physical Exam     Patient Vitals for the past 24 hrs:   BP Temp Temp src Pulse Resp SpO2 Weight   05/03/23 1323 120/68 97.6  F (36.4  C) Temporal 74 18 94 % 68 kg (150 lb)        Physical Exam  Eye:  Pupils are equal, round, and reactive.  Extraocular movements intact.    ENT:  No rhinorrhea.  Moist mucus membranes.  Normal tongue and tonsil.    Cardiac:  Regular rate and rhythm.  No murmurs, gallops, or rubs.    Pulmonary:  Clear to auscultation bilaterally.  No wheezes, rales, or rhonchi.    Abdomen:  Positive bowel sounds.  Abdomen is soft and non-distended, without focal tenderness.    Musculoskeletal:  Normal movement of all extremities without evidence for deficit.    Skin:  Warm and dry without rashes.    Neurologic:  Non-focal exam without asymmetric weakness or numbness.     Psychiatric:  Normal affect with appropriate interaction with  "examiner.    Emergency Department Course     Interventions:  Medications - No data to display     Assessments:  1409 I obtained history and examined the patient, as noted above.  1419 I rechecked and updated the patient.    Independent Interpretation (X-rays, CTs, rhythm strip):  None    Consultations/Discussion of Management or Tests:  None     Social Determinants of Health affecting care:   None    Disposition:  The patient was discharged to home.     Impression & Plan      Medical Decision Making:  This pleasant 72-year-old man with recent diagnosis of prostate cancer and urinary retention presents to us for a May catheter check.  He had a visit with his urologist today where they infuse some fluid into his bladder, removed his catheter, and proceeded with a voiding trial.  He did not pass this study, and therefore needed to have his catheter replaced.  He was chiefly concerned that he has not had a lot of urine out.  He also feels somewhat \"full\" in his suprapubic area.  However, he denies any pain and denies any urine leaking around the catheter.    On my exam, he appears well.  There is clear appearing urine in his leg bag.  His abdomen is soft.  He does feel as though there is more urine in the bag now than when he first arrived in the ER.  Bladder scan shows less than 100 cc.  On further questioning, the patient admits that he did not eat breakfast or drink hardly anything this morning.  After seeing the urologist, he went to the store to purchase some food, but notes that he still has not eaten or drank anything.  I believe that his minimal urine output is more related to minimal oral intake today thus far.  There is no indication to flush the catheter as it was just changed out and is new and appears to be in appropriate position.  The patient feels reassured and was advised to drink ample fluids and he gets home.  He will otherwise continue to work with urology on his urinary retention issues with " immediate return to us for any other emergent concerns.    Diagnosis:    ICD-10-CM    1. Urinary retention  R33.9            Discharge Medications:  New Prescriptions    No medications on file        Scribe Disclosure:  I, Virgilio Gupta, am serving as a scribe at 2:10 PM on 5/3/2023 to document services personally performed by Trierweiler, Chad A, MD based on my observations and the provider's statements to me.      Trierweiler, Chad A, MD  05/04/23 0851

## 2023-05-03 NOTE — ED TRIAGE NOTES
Has laughlin cath for prostate cancer placed 2 weeks ago. Was going to have it removed this AM at urology clinic but did not pass and had to have it reinserted at 0830. Bag not draining now and patient reporting bladder fullness.

## 2023-05-21 ENCOUNTER — HEALTH MAINTENANCE LETTER (OUTPATIENT)
Age: 73
End: 2023-05-21

## 2023-06-22 ENCOUNTER — APPOINTMENT (OUTPATIENT)
Dept: CT IMAGING | Facility: CLINIC | Age: 73
DRG: 689 | End: 2023-06-22
Attending: STUDENT IN AN ORGANIZED HEALTH CARE EDUCATION/TRAINING PROGRAM
Payer: MEDICARE

## 2023-06-22 ENCOUNTER — NURSE TRIAGE (OUTPATIENT)
Dept: NURSING | Facility: CLINIC | Age: 73
End: 2023-06-22
Payer: MEDICARE

## 2023-06-22 ENCOUNTER — HOSPITAL ENCOUNTER (INPATIENT)
Facility: CLINIC | Age: 73
LOS: 3 days | Discharge: HOME | DRG: 689 | End: 2023-06-26
Attending: STUDENT IN AN ORGANIZED HEALTH CARE EDUCATION/TRAINING PROGRAM | Admitting: HOSPITALIST
Payer: MEDICARE

## 2023-06-22 DIAGNOSIS — R47.01 EXPRESSIVE APHASIA: ICD-10-CM

## 2023-06-22 DIAGNOSIS — R42 DIZZINESS: ICD-10-CM

## 2023-06-22 DIAGNOSIS — N30.00 ACUTE CYSTITIS WITHOUT HEMATURIA: Primary | ICD-10-CM

## 2023-06-22 LAB
ALBUMIN SERPL BCG-MCNC: 4.1 G/DL (ref 3.5–5.2)
ALP SERPL-CCNC: 98 U/L (ref 40–129)
ALT SERPL W P-5'-P-CCNC: 18 U/L (ref 0–70)
ANION GAP SERPL CALCULATED.3IONS-SCNC: 14 MMOL/L (ref 7–15)
APTT PPP: 28 SECONDS (ref 22–38)
AST SERPL W P-5'-P-CCNC: 18 U/L (ref 0–45)
ATRIAL RATE - MUSE: 89 BPM
BASOPHILS # BLD AUTO: 0.1 10E3/UL (ref 0–0.2)
BASOPHILS NFR BLD AUTO: 0 %
BILIRUB SERPL-MCNC: 0.6 MG/DL
BUN SERPL-MCNC: 21.9 MG/DL (ref 8–23)
CALCIUM SERPL-MCNC: 9.2 MG/DL (ref 8.8–10.2)
CHLORIDE SERPL-SCNC: 96 MMOL/L (ref 98–107)
CREAT SERPL-MCNC: 0.97 MG/DL (ref 0.67–1.17)
DEPRECATED HCO3 PLAS-SCNC: 23 MMOL/L (ref 22–29)
DIASTOLIC BLOOD PRESSURE - MUSE: NORMAL MMHG
EOSINOPHIL # BLD AUTO: 0 10E3/UL (ref 0–0.7)
EOSINOPHIL NFR BLD AUTO: 0 %
ERYTHROCYTE [DISTWIDTH] IN BLOOD BY AUTOMATED COUNT: 11.9 % (ref 10–15)
GFR SERPL CREATININE-BSD FRML MDRD: 83 ML/MIN/1.73M2
GLUCOSE SERPL-MCNC: 109 MG/DL (ref 70–99)
HCO3 BLDV-SCNC: 29 MMOL/L (ref 21–28)
HCT VFR BLD AUTO: 40.6 % (ref 40–53)
HGB BLD-MCNC: 13.8 G/DL (ref 13.3–17.7)
HOLD SPECIMEN: NORMAL
HOLD SPECIMEN: NORMAL
IMM GRANULOCYTES # BLD: 0.1 10E3/UL
IMM GRANULOCYTES NFR BLD: 0 %
INR PPP: 1.03 (ref 0.85–1.15)
INTERPRETATION ECG - MUSE: NORMAL
LACTATE BLD-SCNC: 0.8 MMOL/L
LYMPHOCYTES # BLD AUTO: 2.4 10E3/UL (ref 0.8–5.3)
LYMPHOCYTES NFR BLD AUTO: 16 %
MCH RBC QN AUTO: 31.4 PG (ref 26.5–33)
MCHC RBC AUTO-ENTMCNC: 34 G/DL (ref 31.5–36.5)
MCV RBC AUTO: 93 FL (ref 78–100)
MONOCYTES # BLD AUTO: 1.3 10E3/UL (ref 0–1.3)
MONOCYTES NFR BLD AUTO: 9 %
NEUTROPHILS # BLD AUTO: 10.5 10E3/UL (ref 1.6–8.3)
NEUTROPHILS NFR BLD AUTO: 75 %
NRBC # BLD AUTO: 0 10E3/UL
NRBC BLD AUTO-RTO: 0 /100
P AXIS - MUSE: 74 DEGREES
PCO2 BLDV: 46 MM HG (ref 40–50)
PH BLDV: 7.41 [PH] (ref 7.32–7.43)
PLATELET # BLD AUTO: 318 10E3/UL (ref 150–450)
PO2 BLDV: 18 MM HG (ref 25–47)
POTASSIUM SERPL-SCNC: 3.8 MMOL/L (ref 3.4–5.3)
PR INTERVAL - MUSE: 128 MS
PROCALCITONIN SERPL IA-MCNC: 0.11 NG/ML
PROT SERPL-MCNC: 7.2 G/DL (ref 6.4–8.3)
QRS DURATION - MUSE: 90 MS
QT - MUSE: 334 MS
QTC - MUSE: 406 MS
R AXIS - MUSE: -5 DEGREES
RBC # BLD AUTO: 4.39 10E6/UL (ref 4.4–5.9)
SAO2 % BLDV: 27 % (ref 94–100)
SODIUM SERPL-SCNC: 133 MMOL/L (ref 136–145)
SYSTOLIC BLOOD PRESSURE - MUSE: NORMAL MMHG
T AXIS - MUSE: 74 DEGREES
TROPONIN T SERPL HS-MCNC: 8 NG/L
VENTRICULAR RATE- MUSE: 89 BPM
WBC # BLD AUTO: 14.3 10E3/UL (ref 4–11)

## 2023-06-22 PROCEDURE — 85014 HEMATOCRIT: CPT | Performed by: EMERGENCY MEDICINE

## 2023-06-22 PROCEDURE — 93005 ELECTROCARDIOGRAM TRACING: CPT

## 2023-06-22 PROCEDURE — 84145 PROCALCITONIN (PCT): CPT | Performed by: STUDENT IN AN ORGANIZED HEALTH CARE EDUCATION/TRAINING PROGRAM

## 2023-06-22 PROCEDURE — 80061 LIPID PANEL: CPT | Performed by: HOSPITALIST

## 2023-06-22 PROCEDURE — G0378 HOSPITAL OBSERVATION PER HR: HCPCS

## 2023-06-22 PROCEDURE — G1010 CDSM STANSON: HCPCS

## 2023-06-22 PROCEDURE — 85610 PROTHROMBIN TIME: CPT | Performed by: STUDENT IN AN ORGANIZED HEALTH CARE EDUCATION/TRAINING PROGRAM

## 2023-06-22 PROCEDURE — 70498 CT ANGIOGRAPHY NECK: CPT | Mod: MG

## 2023-06-22 PROCEDURE — 84484 ASSAY OF TROPONIN QUANT: CPT | Performed by: STUDENT IN AN ORGANIZED HEALTH CARE EDUCATION/TRAINING PROGRAM

## 2023-06-22 PROCEDURE — 85730 THROMBOPLASTIN TIME PARTIAL: CPT | Performed by: STUDENT IN AN ORGANIZED HEALTH CARE EDUCATION/TRAINING PROGRAM

## 2023-06-22 PROCEDURE — 70496 CT ANGIOGRAPHY HEAD: CPT | Mod: MG

## 2023-06-22 PROCEDURE — 250N000011 HC RX IP 250 OP 636: Performed by: STUDENT IN AN ORGANIZED HEALTH CARE EDUCATION/TRAINING PROGRAM

## 2023-06-22 PROCEDURE — 99285 EMERGENCY DEPT VISIT HI MDM: CPT | Mod: 25

## 2023-06-22 PROCEDURE — 99223 1ST HOSP IP/OBS HIGH 75: CPT | Mod: AI | Performed by: HOSPITALIST

## 2023-06-22 PROCEDURE — 83036 HEMOGLOBIN GLYCOSYLATED A1C: CPT | Performed by: HOSPITALIST

## 2023-06-22 PROCEDURE — 250N000009 HC RX 250: Performed by: STUDENT IN AN ORGANIZED HEALTH CARE EDUCATION/TRAINING PROGRAM

## 2023-06-22 PROCEDURE — 36415 COLL VENOUS BLD VENIPUNCTURE: CPT | Performed by: EMERGENCY MEDICINE

## 2023-06-22 PROCEDURE — 85025 COMPLETE CBC W/AUTO DIFF WBC: CPT | Performed by: STUDENT IN AN ORGANIZED HEALTH CARE EDUCATION/TRAINING PROGRAM

## 2023-06-22 PROCEDURE — 0042T CT HEAD PERFUSION W CONTRAST: CPT

## 2023-06-22 PROCEDURE — 83605 ASSAY OF LACTIC ACID: CPT

## 2023-06-22 PROCEDURE — 80053 COMPREHEN METABOLIC PANEL: CPT | Performed by: STUDENT IN AN ORGANIZED HEALTH CARE EDUCATION/TRAINING PROGRAM

## 2023-06-22 PROCEDURE — 82803 BLOOD GASES ANY COMBINATION: CPT

## 2023-06-22 RX ORDER — ASPIRIN 325 MG
325 TABLET ORAL ONCE
Status: COMPLETED | OUTPATIENT
Start: 2023-06-22 | End: 2023-06-23

## 2023-06-22 RX ORDER — CLOPIDOGREL 300 MG/1
300 TABLET, FILM COATED ORAL ONCE
Status: COMPLETED | OUTPATIENT
Start: 2023-06-22 | End: 2023-06-23

## 2023-06-22 RX ORDER — PREDNISONE 5 MG/1
5 TABLET ORAL 2 TIMES DAILY
COMMUNITY

## 2023-06-22 RX ORDER — ABIRATERONE ACETATE 250 MG/1
1000 TABLET ORAL DAILY
COMMUNITY
End: 2024-10-03

## 2023-06-22 RX ORDER — IOPAMIDOL 755 MG/ML
125 INJECTION, SOLUTION INTRAVASCULAR ONCE
Status: COMPLETED | OUTPATIENT
Start: 2023-06-22 | End: 2023-06-22

## 2023-06-22 RX ADMIN — SODIUM CHLORIDE 100 ML: 9 INJECTION, SOLUTION INTRAVENOUS at 21:48

## 2023-06-22 RX ADMIN — IOPAMIDOL 125 ML: 755 INJECTION, SOLUTION INTRAVENOUS at 21:48

## 2023-06-22 ASSESSMENT — ACTIVITIES OF DAILY LIVING (ADL): ADLS_ACUITY_SCORE: 35

## 2023-06-23 ENCOUNTER — APPOINTMENT (OUTPATIENT)
Dept: GENERAL RADIOLOGY | Facility: CLINIC | Age: 73
DRG: 689 | End: 2023-06-23
Attending: STUDENT IN AN ORGANIZED HEALTH CARE EDUCATION/TRAINING PROGRAM
Payer: MEDICARE

## 2023-06-23 ENCOUNTER — APPOINTMENT (OUTPATIENT)
Dept: OCCUPATIONAL THERAPY | Facility: CLINIC | Age: 73
DRG: 689 | End: 2023-06-23
Attending: HOSPITALIST
Payer: MEDICARE

## 2023-06-23 ENCOUNTER — APPOINTMENT (OUTPATIENT)
Dept: MRI IMAGING | Facility: CLINIC | Age: 73
DRG: 689 | End: 2023-06-23
Attending: STUDENT IN AN ORGANIZED HEALTH CARE EDUCATION/TRAINING PROGRAM
Payer: MEDICARE

## 2023-06-23 LAB
ALBUMIN UR-MCNC: 30 MG/DL
ANION GAP SERPL CALCULATED.3IONS-SCNC: 13 MMOL/L (ref 7–15)
APPEARANCE UR: CLEAR
BACTERIA #/AREA URNS HPF: ABNORMAL /HPF
BILIRUB UR QL STRIP: NEGATIVE
BUN SERPL-MCNC: 17.1 MG/DL (ref 8–23)
CALCIUM SERPL-MCNC: 8.7 MG/DL (ref 8.8–10.2)
CHLORIDE SERPL-SCNC: 101 MMOL/L (ref 98–107)
CHOLEST SERPL-MCNC: 159 MG/DL
COLOR UR AUTO: YELLOW
CREAT SERPL-MCNC: 0.89 MG/DL (ref 0.67–1.17)
DEPRECATED HCO3 PLAS-SCNC: 22 MMOL/L (ref 22–29)
ERYTHROCYTE [DISTWIDTH] IN BLOOD BY AUTOMATED COUNT: 12 % (ref 10–15)
GFR SERPL CREATININE-BSD FRML MDRD: >90 ML/MIN/1.73M2
GLUCOSE BLDC GLUCOMTR-MCNC: 126 MG/DL (ref 70–99)
GLUCOSE BLDC GLUCOMTR-MCNC: 156 MG/DL (ref 70–99)
GLUCOSE BLDC GLUCOMTR-MCNC: 93 MG/DL (ref 70–99)
GLUCOSE SERPL-MCNC: 106 MG/DL (ref 70–99)
GLUCOSE UR STRIP-MCNC: NEGATIVE MG/DL
HBA1C MFR BLD: 5.3 %
HCT VFR BLD AUTO: 37.9 % (ref 40–53)
HDLC SERPL-MCNC: 54 MG/DL
HGB BLD-MCNC: 13 G/DL (ref 13.3–17.7)
HGB UR QL STRIP: ABNORMAL
KETONES UR STRIP-MCNC: NEGATIVE MG/DL
LACTATE SERPL-SCNC: 0.8 MMOL/L (ref 0.7–2)
LDLC SERPL CALC-MCNC: 87 MG/DL
LEUKOCYTE ESTERASE UR QL STRIP: ABNORMAL
MCH RBC QN AUTO: 31.5 PG (ref 26.5–33)
MCHC RBC AUTO-ENTMCNC: 34.3 G/DL (ref 31.5–36.5)
MCV RBC AUTO: 92 FL (ref 78–100)
MUCOUS THREADS #/AREA URNS LPF: PRESENT /LPF
NITRATE UR QL: POSITIVE
NONHDLC SERPL-MCNC: 105 MG/DL
PH UR STRIP: 7.5 [PH] (ref 5–7)
PLATELET # BLD AUTO: 251 10E3/UL (ref 150–450)
POTASSIUM SERPL-SCNC: 3.4 MMOL/L (ref 3.4–5.3)
RBC # BLD AUTO: 4.13 10E6/UL (ref 4.4–5.9)
RBC URINE: >182 /HPF
SODIUM SERPL-SCNC: 136 MMOL/L (ref 136–145)
SP GR UR STRIP: 1 (ref 1–1.03)
TRIGL SERPL-MCNC: 91 MG/DL
UROBILINOGEN UR STRIP-MCNC: NORMAL MG/DL
WBC # BLD AUTO: 16.5 10E3/UL (ref 4–11)
WBC URINE: 145 /HPF

## 2023-06-23 PROCEDURE — 250N000012 HC RX MED GY IP 250 OP 636 PS 637: Performed by: HOSPITALIST

## 2023-06-23 PROCEDURE — 258N000003 HC RX IP 258 OP 636: Performed by: HOSPITALIST

## 2023-06-23 PROCEDURE — 250N000009 HC RX 250: Performed by: PHYSICIAN ASSISTANT

## 2023-06-23 PROCEDURE — 255N000002 HC RX 255 OP 636: Performed by: STUDENT IN AN ORGANIZED HEALTH CARE EDUCATION/TRAINING PROGRAM

## 2023-06-23 PROCEDURE — 87086 URINE CULTURE/COLONY COUNT: CPT | Performed by: HOSPITALIST

## 2023-06-23 PROCEDURE — 120N000001 HC R&B MED SURG/OB

## 2023-06-23 PROCEDURE — A9585 GADOBUTROL INJECTION: HCPCS | Performed by: STUDENT IN AN ORGANIZED HEALTH CARE EDUCATION/TRAINING PROGRAM

## 2023-06-23 PROCEDURE — 83605 ASSAY OF LACTIC ACID: CPT | Performed by: HOSPITALIST

## 2023-06-23 PROCEDURE — G0378 HOSPITAL OBSERVATION PER HR: HCPCS

## 2023-06-23 PROCEDURE — 99232 SBSQ HOSP IP/OBS MODERATE 35: CPT | Performed by: HOSPITALIST

## 2023-06-23 PROCEDURE — 96361 HYDRATE IV INFUSION ADD-ON: CPT

## 2023-06-23 PROCEDURE — 96374 THER/PROPH/DIAG INJ IV PUSH: CPT

## 2023-06-23 PROCEDURE — 97166 OT EVAL MOD COMPLEX 45 MIN: CPT | Mod: GO

## 2023-06-23 PROCEDURE — 70553 MRI BRAIN STEM W/O & W/DYE: CPT | Mod: MA

## 2023-06-23 PROCEDURE — 97535 SELF CARE MNGMENT TRAINING: CPT | Mod: GO

## 2023-06-23 PROCEDURE — 250N000011 HC RX IP 250 OP 636: Mod: JZ | Performed by: HOSPITALIST

## 2023-06-23 PROCEDURE — 250N000013 HC RX MED GY IP 250 OP 250 PS 637: Performed by: HOSPITALIST

## 2023-06-23 PROCEDURE — 87040 BLOOD CULTURE FOR BACTERIA: CPT | Performed by: HOSPITALIST

## 2023-06-23 PROCEDURE — 36415 COLL VENOUS BLD VENIPUNCTURE: CPT | Performed by: HOSPITALIST

## 2023-06-23 PROCEDURE — 81001 URINALYSIS AUTO W/SCOPE: CPT | Performed by: STUDENT IN AN ORGANIZED HEALTH CARE EDUCATION/TRAINING PROGRAM

## 2023-06-23 PROCEDURE — 80048 BASIC METABOLIC PNL TOTAL CA: CPT | Performed by: HOSPITALIST

## 2023-06-23 PROCEDURE — 82962 GLUCOSE BLOOD TEST: CPT

## 2023-06-23 PROCEDURE — 85027 COMPLETE CBC AUTOMATED: CPT | Performed by: HOSPITALIST

## 2023-06-23 PROCEDURE — 71046 X-RAY EXAM CHEST 2 VIEWS: CPT

## 2023-06-23 PROCEDURE — 97530 THERAPEUTIC ACTIVITIES: CPT | Mod: GO

## 2023-06-23 PROCEDURE — 250N000013 HC RX MED GY IP 250 OP 250 PS 637: Performed by: STUDENT IN AN ORGANIZED HEALTH CARE EDUCATION/TRAINING PROGRAM

## 2023-06-23 PROCEDURE — 99221 1ST HOSP IP/OBS SF/LOW 40: CPT | Mod: GC | Performed by: STUDENT IN AN ORGANIZED HEALTH CARE EDUCATION/TRAINING PROGRAM

## 2023-06-23 RX ORDER — SODIUM CHLORIDE 9 MG/ML
INJECTION, SOLUTION INTRAVENOUS CONTINUOUS
Status: ACTIVE | OUTPATIENT
Start: 2023-06-23 | End: 2023-06-23

## 2023-06-23 RX ORDER — LIDOCAINE 40 MG/G
CREAM TOPICAL
Status: DISCONTINUED | OUTPATIENT
Start: 2023-06-23 | End: 2023-06-26 | Stop reason: HOSPADM

## 2023-06-23 RX ORDER — LIDOCAINE HYDROCHLORIDE 20 MG/ML
JELLY TOPICAL ONCE
Status: COMPLETED | OUTPATIENT
Start: 2023-06-23 | End: 2023-06-23

## 2023-06-23 RX ORDER — ONDANSETRON 2 MG/ML
4 INJECTION INTRAMUSCULAR; INTRAVENOUS EVERY 6 HOURS PRN
Status: DISCONTINUED | OUTPATIENT
Start: 2023-06-23 | End: 2023-06-26 | Stop reason: HOSPADM

## 2023-06-23 RX ORDER — CEFTRIAXONE 1 G/1
1 INJECTION, POWDER, FOR SOLUTION INTRAMUSCULAR; INTRAVENOUS EVERY 24 HOURS
Status: DISCONTINUED | OUTPATIENT
Start: 2023-06-23 | End: 2023-06-26 | Stop reason: HOSPADM

## 2023-06-23 RX ORDER — CLOPIDOGREL BISULFATE 75 MG/1
75 TABLET ORAL DAILY
Status: DISCONTINUED | OUTPATIENT
Start: 2023-06-23 | End: 2023-06-23

## 2023-06-23 RX ORDER — PREDNISONE 5 MG/1
5 TABLET ORAL 2 TIMES DAILY
Status: DISCONTINUED | OUTPATIENT
Start: 2023-06-23 | End: 2023-06-26 | Stop reason: HOSPADM

## 2023-06-23 RX ORDER — ABIRATERONE ACETATE 250 MG/1
1000 TABLET ORAL DAILY
Status: DISCONTINUED | OUTPATIENT
Start: 2023-06-23 | End: 2023-06-26 | Stop reason: HOSPADM

## 2023-06-23 RX ORDER — GADOBUTROL 604.72 MG/ML
7 INJECTION INTRAVENOUS ONCE
Status: COMPLETED | OUTPATIENT
Start: 2023-06-23 | End: 2023-06-23

## 2023-06-23 RX ORDER — ASPIRIN 81 MG/1
81 TABLET, CHEWABLE ORAL DAILY
Status: DISCONTINUED | OUTPATIENT
Start: 2023-06-23 | End: 2023-06-23

## 2023-06-23 RX ORDER — ATORVASTATIN CALCIUM 40 MG/1
40 TABLET, FILM COATED ORAL DAILY
Status: DISCONTINUED | OUTPATIENT
Start: 2023-06-23 | End: 2023-06-23

## 2023-06-23 RX ORDER — ASPIRIN 81 MG/1
81 TABLET ORAL DAILY
Status: DISCONTINUED | OUTPATIENT
Start: 2023-06-23 | End: 2023-06-23

## 2023-06-23 RX ORDER — ACETAMINOPHEN 325 MG/1
650 TABLET ORAL EVERY 4 HOURS PRN
Status: DISCONTINUED | OUTPATIENT
Start: 2023-06-23 | End: 2023-06-26 | Stop reason: HOSPADM

## 2023-06-23 RX ORDER — TAMSULOSIN HYDROCHLORIDE 0.4 MG/1
0.4 CAPSULE ORAL DAILY
Status: DISCONTINUED | OUTPATIENT
Start: 2023-06-23 | End: 2023-06-26 | Stop reason: HOSPADM

## 2023-06-23 RX ORDER — ONDANSETRON 4 MG/1
4 TABLET, ORALLY DISINTEGRATING ORAL EVERY 6 HOURS PRN
Status: DISCONTINUED | OUTPATIENT
Start: 2023-06-23 | End: 2023-06-26 | Stop reason: HOSPADM

## 2023-06-23 RX ADMIN — SODIUM CHLORIDE 500 ML: 9 INJECTION, SOLUTION INTRAVENOUS at 15:52

## 2023-06-23 RX ADMIN — GADOBUTROL 7 ML: 604.72 INJECTION INTRAVENOUS at 01:17

## 2023-06-23 RX ADMIN — SODIUM CHLORIDE: 9 INJECTION, SOLUTION INTRAVENOUS at 18:54

## 2023-06-23 RX ADMIN — ACETAMINOPHEN 650 MG: 325 TABLET ORAL at 15:51

## 2023-06-23 RX ADMIN — CLOPIDOGREL BISULFATE 300 MG: 300 TABLET, FILM COATED ORAL at 00:27

## 2023-06-23 RX ADMIN — LIDOCAINE HYDROCHLORIDE: 20 JELLY TOPICAL at 15:14

## 2023-06-23 RX ADMIN — ABIRATERONE 1000 MG: 250 TABLET ORAL at 08:30

## 2023-06-23 RX ADMIN — ASPIRIN 325 MG ORAL TABLET 325 MG: 325 PILL ORAL at 00:27

## 2023-06-23 RX ADMIN — CEFTRIAXONE SODIUM 1 G: 1 INJECTION, POWDER, FOR SOLUTION INTRAMUSCULAR; INTRAVENOUS at 05:01

## 2023-06-23 RX ADMIN — ACETAMINOPHEN 650 MG: 325 TABLET ORAL at 08:30

## 2023-06-23 RX ADMIN — PREDNISONE 5 MG: 5 TABLET ORAL at 08:30

## 2023-06-23 RX ADMIN — TAMSULOSIN HYDROCHLORIDE 0.4 MG: 0.4 CAPSULE ORAL at 08:30

## 2023-06-23 RX ADMIN — SODIUM CHLORIDE: 9 INJECTION, SOLUTION INTRAVENOUS at 02:47

## 2023-06-23 RX ADMIN — PREDNISONE 5 MG: 5 TABLET ORAL at 20:09

## 2023-06-23 RX ADMIN — ACETAMINOPHEN 650 MG: 325 TABLET ORAL at 23:14

## 2023-06-23 RX ADMIN — SODIUM CHLORIDE: 9 INJECTION, SOLUTION INTRAVENOUS at 13:13

## 2023-06-23 ASSESSMENT — ACTIVITIES OF DAILY LIVING (ADL)
ADLS_ACUITY_SCORE: 37
ADLS_ACUITY_SCORE: 35
ADLS_ACUITY_SCORE: 37
ADLS_ACUITY_SCORE: 35
ADLS_ACUITY_SCORE: 37
ADLS_ACUITY_SCORE: 41
ADLS_ACUITY_SCORE: 41
ADLS_ACUITY_SCORE: 37
ADLS_ACUITY_SCORE: 37

## 2023-06-23 NOTE — PROGRESS NOTES
[facility]  Hospitalist Progress Note        Bharathi Galloway MD   06/23/2023        Interval History:        - MRI 6/23 negative for acute infarct, mass, mass effect, or hemorrhage  - spiked temp of 102.8, noted abnormal UA, started on Rocephin, urine culture sent  - evaluated by stroke neurology in ED and was suggested for ASA/Plavix-- got 1st does in ED, none scheduled; given negative MRI findings, will defer to neurology if antiplatelet still needs to be considered  - will hold off on ECHO given no stroke         Assessment and Plan:        Don Venegas is a 72 year old male with a recent history of prostate cancer status post TURP on April 23, status post right nephrostomy tube placement with IR which has since been removed, urinary retention with a May catheter which was removed on Tuesday, June 20 who presents to hospital with dizziness and word finding difficulty. Initial concern was for stroke , however imaging negative but noted with likely UTI .      Dizziness, orthostatic hypotension  Expressive aphasia-- improved  Likely UTI with metabolic encephalopathy  Leukocytosis  mild hyponatremia  - initially presenting with dizziness and expressive aphasia; CODE stroke was activated in ED; CT scan and CTA did not reveal any acute pathology. MRI negative for an acute stroke  - afebrile on admission , then spiked temp of 102.8, noted abnormal UA, started on Rocephin, urine culture sent  - mild leukocytosis likely due to steroids  - evaluated by stroke neurology in ED and was suggested for ASA/Plavix-- got 1st does in ED, none scheduled; given negative MRI findings, will defer to neurology if antiplatelet still needs to be considered-- discussed with Dr Swift, no need for antiplatelets and no need for further stroke workup  - will hold off on ECHO given no stroke  - awaiting PT/OT  - sodium improving 133 -- 136 ; noted significant orthostatic drop in blood pressure; will give 500 ML fluid  "bolus and increase maintenance IVF NS to 100 ML per hour as clinically looks slightly dehydrated, encourage PO intake  -will monitor orthostatic vitals     Metastatic Prostate cancer  urinary retention  - Status post TURP, follows with urology; as noted above Laughlin catheter was recently removed on 6/20; has been voiding but with some intermittent retention with PVRs >400  -continue PTA Zytiga, prednisone, Flomax  - given recent TURP and recent removal of laughlin catheter, will consult Urology for further management of retention    Diet: Combination Diet Regular Diet         DVT Prophylaxis: clinical with PCD boots    Code status: full code    Disposition:   -likely 1 to 2 days pending clinical improvement, final urine cultures  -PT consulted    Clinically Significant Risk Factors Present on Admission                                   Page Me (7 am to 6 pm)    Care plan discussed with patient and neurology; also discussed with nursing and UR    Will change admit status to inpatient per utilization review.              Physical Exam:      Blood pressure 135/61, pulse 86, temperature (!) 102.8  F (39.3  C), temperature source Oral, resp. rate 18, height 1.778 m (5' 10\"), weight 62.5 kg (137 lb 12.6 oz), SpO2 98 %.  Vitals:    06/23/23 0228   Weight: 62.5 kg (137 lb 12.6 oz)     Vital Signs with Ranges  Temp:  [98.1  F (36.7  C)-102.8  F (39.3  C)] 102.8  F (39.3  C)  Pulse:  [85-96] 86  Resp:  [13-19] 18  BP: (114-154)/(55-69) 135/61  SpO2:  [97 %-99 %] 98 %  I/O's Last 24 hours  I/O last 3 completed shifts:  In: -   Out: 300 [Urine:300]    Constitutional: Alert, awake and oriented ; resting comfortably in no apparent distress; expressive aphasia seems to have much improved , still with some word finding difficulty        Oral cavity: Moist mucosa   Cardiovascular: Normal s1 s2, regular rate and rhythm, no murmur   Lungs: B/l clear to auscultation, no wheezes or crepitations   Abdomen: Soft, nt, nd, no guarding, rigidity " or rebound; BS +   LE : No edema   Musculoskeletal/Neuro Power 5/5 in all extremities; No focal neurological deficits noted   Psychiatry: normal mood and affect                Medications:          abiraterone  1,000 mg Oral Daily     cefTRIAXone  1 g Intravenous Q24H     predniSONE  5 mg Oral BID     sodium chloride (PF)  3 mL Intracatheter Q8H     tamsulosin  0.4 mg Oral Daily     PRN Meds: acetaminophen, lidocaine 4%, lidocaine (buffered or not buffered), - MEDICATION INSTRUCTIONS -, - MEDICATION INSTRUCTIONS -, ondansetron **OR** ondansetron, sodium chloride (PF)         Data:      All new lab and imaging data was reviewed.   Recent Labs   Lab Test 06/22/23 2042 04/25/23  0758 04/23/23  2254 04/23/23  0653   WBC 14.3* 13.2*  --  14.6*   HGB 13.8 12.3*  --  12.7*   MCV 93 91  --  91    226  --  228   INR 1.03  --  1.18*  --       Recent Labs   Lab Test 06/23/23  0728 06/22/23 2042 04/27/23  0828 04/25/23  0758   NA  --  133* 139 140   POTASSIUM  --  3.8 3.4 3.6   CHLORIDE  --  96* 102 105   CO2  --  23 26 26   BUN  --  21.9 10.1 14.4   CR  --  0.97 0.73 0.83   ANIONGAP  --  14 11 9   MIKY  --  9.2 8.6* 8.6*   GLC 93 109* 96 97     No lab results found.    Invalid input(s): TROP, TROPONINIES

## 2023-06-23 NOTE — UTILIZATION REVIEW
Admission Status; Secondary Review Determination       Under the authority of the Utilization Management Committee, the utilization review process indicated a secondary review on the above patient. The review outcome is based on review of the medical records, discussions with staff, and applying clinical experience noted on the date of the review.     (x) Inpatient Status Appropriate - This patient's medical care is consistent with medical management for inpatient care and reasonable inpatient medical practice.     RATIONALE FOR DETERMINATION   72-year-old male with a recent diagnosis of metastatic prostate cancer, urinary retention, and a history of nephrolithiasis with a right ureteral stent presented on 6/22 with dizziness. There was concern for a urinary tract infection (UTI) and possible urosepsis. Broad-spectrum antibiotics were continued pending urine culture results, and the consideration of adding blood cultures was made. Due to the patient's history of retention and recent teaching of clean intermittent catheterization (CIC), a decision was made to place a laughlin catheter to prevent the need for recurrent straight catheterization and to address the risk of infection.  Patient has recorded 102.8 temperature in the hospital, significant leukocytosis, hypotension with blood pressure 88 systolic earlier this morning.  Dr Galloway notified  The expected length of stay at the time of admission was more than 2 nights because of the severity of illness, intensity of service provided, and risk for adverse outcome. Inpatient admission is appropriate.         This document was produced using voice recognition software       The information on this document is developed by the utilization review team in order for the business office to ensure compliance. This only denotes the appropriateness of proper admission status and does not reflect the quality of care rendered.   The definitions of Inpatient Status and  Observation Status used in making the determination above are those provided in the CMS Coverage Manual, Chapter 1 and Chapter 6, section 70.4.   Sincerely,   JAYDEN CRAIG MD   System Medical Director   Utilization Management   [facility].

## 2023-06-23 NOTE — PLAN OF CARE
Pt here with WFD, dizziness. A&Ox4. Neuros WFD, dizziness when ambulating. VSS on RA. Permissive HTN orders. Tele SR. Passed bedside swallow eval. Continent. No BM this shift. Up with SBA GB. Denies pain. UA obtained, MD notified of results. IV rocephin started at 0500. NS infusing at 75ml/hr. R PIV in AC. Skin intact. Pt scoring green on the Aggression Stop Light Tool. Plan for echo. Urine cultures pending. Discharge pending workup.

## 2023-06-23 NOTE — CONSULTS
"  [facility]    Stroke Telephone Note    I was called by Guadalupe Contreras on 06/22/23 regarding patient Don Venegas. The patient is a 72 year old male who presents with dizziness, weakness and word finding difficulty. He has also increased fatigue and took a 4 hour nap which is unusual. He receives Eliigard injections, last on Tuesday. He is unable to describe the dizziness.   Onset: 1400  PMhx: Prostate Cancer    /62   Pulse 85   Temp 99.5  F (37.5  C) (Oral)   Resp 18   SpO2 99%      Stroke Code Data (for stroke code without tele)  Stroke code activated 06/22/23 2132   Stroke provider first response  06/22/23 2135            Last known normal 06/22/23   1400        Time of discovery   (or onset of symptoms) 06/22/23   1400   Head CT read by Stroke Neuro Dr/Provider 06/22/23 2145   Was stroke code de-escalated? Yes 06/22/23 2209          Imaging Findings      Intravenous Thrombolysis  Not given due to:   - unclear or unfavorable risk-benefit profile for extended window thrombolysis beyond the conventional 4.5 hour time window    Endovascular Treatment  Not initiated due to absence of proximal vessel occlusion    Impression  # Word finding difficulty- presents with onset at 1400 of word finding difficulties. He has some dizziness and has generalized weakness. Symptoms are ongoing with just word finding with equal strength and is able to ambulate. His symptoms are either TIA vs stroke vs toxic/metabolic etiology.       Recommendations   - Use orderset: \"Ischemic Stroke Routine Admission\" or \"Ischemic Stroke No Thrombolytics/No Thrombectomy ICU Admission\"  - Neurochecks and Vital Signs every Q4H   - Permissive HTN; goal SBP < 220 mmHg  - Daily aspirin 81 mg for secondary stroke prevention  - Plavix (clopidogrel) 300 mg PO loading dose x 1  - Plavix (clopidogrel) 75 mg PO Daily  - Statin: Lipitor 40mg  - MRI Brain with and without contrast  - TTE (with Bubble Study if age 60 " "yrs or less)  - Telemetry, EKG  - Bedside Glucose Monitoring  - A1c, Lipid Panel, Troponin x 3  - PT/OT/SLP  - Stroke Education  - Euthermia, Euglycemia   -Aspirin 325mg x1 dose    Case discussed with vascular neurology attending Dr. Troy      My recommendations are based on the information provided over the phone by Don Venegas's in-person providers. They are not intended to replace the clinical judgment of his in-person providers. I was not requested to personally see or examine the patient at this time.    The Stroke Staff is Dr. Troy  .    Chandni Hutton MD  Vascular Neurology Fellow    To page me or covering stroke neurology team member, click here: [facility]  Choose \"On Call\" tab at top, then select \"NEUROLOGY/ALL SITES\" from middle drop-down box, press Enter, then look for \"stroke\" or \"telestroke\" for your site.        "

## 2023-06-23 NOTE — ED TRIAGE NOTES
Pt presents with sister who says around [id number] pt started having dizziness, confusion and weakness. Upon assessment pt has word finding difficulty but answers all orientation questions right. All other neuro seems to be intact. Pt states he has been having chills. Temp here 99.5 oral. Pt is a urology pt for prostate cancer.

## 2023-06-23 NOTE — PROGRESS NOTES
23 1122   Appointment Info   Signing Clinician's Name / Credentials (OT) Chikis Blackwood, OTR/L   Living Environment   People in Home alone   Current Living Arrangements apartment   Home Accessibility   (pt could not remember)   Transportation Anticipated car, drives self   Living Environment Comments Pt provided above info, however was confused and could not respond to all inquiries. Could not give reply as to if had tub/shower or walk-in shower   Self-Care   Current Activity Tolerance fair   Activity/Exercise/Self-Care Comment Pt fatigued at time of eval, may not be reliable historian as demonstrated confusion. He did state that he is indep with all ADL, IADL including driving - he could not remember how many medications he takes nor give specifics regarding other tasks.   General Information   Onset of Illness/Injury or Date of Surgery 23   Referring Physician Dr. Luisa Cordova   Patient/Family Therapy Goal Statement (OT) did not state   Additional Occupational Profile Info/Pertinent History of Current Problem From chart: 72 year old male with a recent history of prostate cancer status post TURP on , status post right nephrostomy tube placement with IR which has since been removed, urinary retention with a May catheter which was removed on  who presents to hospital with dizziness and word finding difficulty. Initial concern was for stroke , however imaging negative but noted with likely UTI .   Existing Precautions/Restrictions fall  (orthostatic drop in BP)   Limitations/Impairments safety/cognitive   Cognitive Status Examination   Orientation Status person;other (see comments)   Follows Commands follows one-step commands;over 90% accuracy   Cognitive Status Comments Pt confused - oriented intermittently. Unable to state  (stated month but not date or year) yet did so at completion of assessment when asked again.   Visual Perception   Impact of Vision Impairment on  Function (Vision) Assessment difficult as pt not following directives consistently - he denied any visual changes but was unable to locate objects in left field and superior fields.   Pain Assessment   Patient Currently in Pain No   Range of Motion Comprehensive   General Range of Motion no range of motion deficits identified   Strength Comprehensive (MMT)   General Manual Muscle Testing (MMT) Assessment no strength deficits identified   Coordination   Coordination Comments B FMC slow but appears functional - needs further assessment   Bed Mobility   Comment (Bed Mobility) SBA   Transfers   Transfer Comments Sit-stand from bed initially Min A however improved to CGA   Balance   Balance Comments Amb around room only with pt pushing IV pole, CGA provided as pt's gait deviated at times but no LOB.   Activities of Daily Living   BADL Assessment/Intervention toileting   Toileting   Comment, (Toileting) Pt did not know how to use urinal requiring A with clothing mgmt and placement of urinal in hand along with vc's in standing   Clinical Impression   Criteria for Skilled Therapeutic Interventions Met (OT) Yes, treatment indicated   OT Diagnosis decline in ADL, IADL performance   OT Problem List-Impairments impacting ADL problems related to;activity tolerance impaired;cognition   Assessment of Occupational Performance 5 or more Performance Deficits   Identified Performance Deficits functional mobility, grooming, toileting, dressing, , bathing, HH management, community mobility   Planned Therapy Interventions (OT) ADL retraining;IADL retraining;cognition;fine motor coordination training;transfer training;visual perception   Clinical Decision Making Complexity (OT) moderate complexity   Risk & Benefits of therapy have been explained evaluation/treatment results reviewed;care plan/treatment goals reviewed;risks/benefits reviewed;current/potential barriers reviewed;participants voiced agreement with care plan;participants  included;patient   OT Total Evaluation Time   OT Eval, Moderate Complexity Minutes (61642) 20   Therapy Certification   Start of Care Date 06/23/23   Certification date from 06/23/23   Certification date to 06/25/23   Medical Diagnosis UTI   OT Goals   Therapy Frequency (OT) Daily   OT Predicted Duration/Target Date for Goal Attainment 06/25/23   OT Goals Cognition;Transfers;OT Goal 1   OT: Transfer Independent  (All functional transfers (bed, toilet, chairs, stairs))   OT: Cognitive Patient/caregiver will verbalize understanding of cognitive assessment results/recommendations as needed for safe discharge planning   OT: Goal 1 Patient will demo ability to perform all self-care tasks independently and safely in room as requisite to return to [place].   Interventions   Interventions Quick Adds Self-Care/Home Management;Therapeutic Activity   Self-Care/Home Management   Self-Care/Home Mgmt/ADL, Compensatory, Meal Prep Minutes (41126) 28   Treatment Detail/Skilled Intervention OT: pt sleeping soundly upon arrival and difficult to awaken then had difficulty keeping eyes open and maintaining attention. Attention did improve once up. Pt oriented to self (after 2-3 attempts to state name) and to birth month but could not name date or year of birth until end of session. Initially disoriented to place, date however improved to be able to state full date and place. When asked to read time on clock patient unable to find clock on wall - given cues and 5min and still unable and clock on left wall in pt's superior field. While scanning room he did not once look up past sight midline level until cued. He then scanned ceiling requiring cues to lower gaze but still did not locate clock. Attempted to assess visual fields and pt did locate light (used penlight) x 4Q following those directions effectively so does not appear to be visual but cognitive issue.  Pt performed sup<>sit SBA. Sat EOB and doffed/donned slipper socks using figure 4  "position without difficulty. When standing to urinate - he did not know how to use urinal, required A with gown and underwear and passive placement of urinal in his hand and CGA for standing balance. Task done at EOB due to pt's orthostatic issue (see theract note below). At completion of session, pt returned to supine and nursing present to do bladder scan. VS taken throughout -see TherAct note,   Therapeutic Activities   Therapeutic Activity Minutes (00803) 10   Symptoms noted during/after treatment significant change in vital signs;fatigue   Treatment Detail/Skilled Intervention OT: See VS flowsheet as orthostatic BPs taken and pt had sig changes. Sit-stand from bed initially Min A as pt unsteady but improved and on subsequent trials CGA for safety only but no LOB. Pt amb around room pushing IV pole CGA and initially had some gait deviation but that improved the longer up, while CGA provided he had no overt LOB. Stand<>sit on lower couch required Min A to stand but again improved 2nd trial to CGA. Time includes monitoring of BP.   OT Discharge Planning   OT Plan SLUMS, screen mobilities for PT (they have on at 7P) if pt's cog function clearing and orthostatic BP's better to allow for amb to satellite to check stairs) Also, obtain more reliable baseline info if cog more clear. Could also add a med mgmt goal.   OT Discharge Recommendation (DC Rec) home with assist;Transitional Care Facility   OT Rationale for DC Rec Pt lives alone and is indep all ADL, IADL, mob at baseline however currently his level of confusion is affecting performance. Anticipate with medical management of UTI patient will \"clear\" to be able to return home but if not will need either assist at home or TCU stay. OT to re-assess tomorrow and will do cognitive assessment with futher discharge recommend to follow.   OT Brief overview of current status Pt confused, \"spacey\", requiring A with managing toileting, unable to give accurate report of " simple baseline information. SBA bed mob, amb in room pushing IV pole CGA, however BP drops significantly when up. Orthostatic BP's taken and dropped from 150/55 to 88/47.   Total Session Time   Timed Code Treatment Minutes 38   Total Session Time (sum of timed and untimed services) 58

## 2023-06-23 NOTE — PLAN OF CARE
Goal Outcome Evaluation:       Dx: Dizziness  Neuro: A&O x4, neuro intact.   Cardiac: Tele NSR. Reported dizziness- Orthostics done, significant drop from sitting to standing. VSS.  Resp: Clear, Equal bilaterally. RA  GI/: UTI with retention. May inserted 6/23, BM before admission.   Diet: Regular. Thin Liquid. Pills whole with liquids.   Activity: 1A GB, walker.   Musculoskeletal: WDL, generalized weakness  Skin: WDL  Pain: denies  Lab/ tests: Sepsis protocol. WBC elevated.   IV: R PIV, SL. L PIV- NS infusing 100mL. 500ml Bolus given.   Consults: urology, cleared by stroke neuro.   Other: Intermittent confusion and difficulty with word finding r/t UTI per neuro. Intermittent increased temp-102.8, 99.6 - Tylenol given x2  Plan: OT reassessment tomorrow. IV Rocephin.

## 2023-06-23 NOTE — ED PROVIDER NOTES
ED ATTENDING PHYSICIAN NOTE:   I evaluated this patient in conjunction with Guadalupe Contreras PA-C  I have participated in the care of the patient and personally performed key elements of the history, exam, and medical decision making.      HPI:   Don Venegas is a 72 year old male who presents with dizziness and word finding difficulty.  He has also had some generalized fatigue and weakness.  The patient has been having worsening symptoms since around 1 PM.  He has a history of prostate cancer that is metastatic to bone and lymph.  He had a May catheter recently removed that had been in place for couple of months.  He states that it has been harder to urinate and that he has had some mild burning.    Independent Historian:   Sister provides most of the history above.    Review of External Notes: None     EXAM:   General: Laying on the ED bed, no distress  HEENT: Normocephalic, atraumatic  Cardiac: Warm and well perfused, regular rate and rhythm  Pulm: Breathing comfortably, no accessory muscle usage, no conversational dyspnea, and lungs clear bilaterally  GI: Abdomen soft, nontender, no rigidity or guarding  MSK: No deformities  Skin: Warm and dry  Neuro: Awake and alert, some word finding difficulty/delayed speech but also able to intermittently answer questions appropriately in context  Psych: Normal mood and affect     Independent Interpretation (X-rays, CTs, rhythm strip):  None    Consultations/Discussion of Management or Tests:  None     Social Determinants of Health affecting care:   None     MEDICAL DECISION MAKING/ASSESSMENT AND PLAN:    72-year-old male presents with symptoms as above.  Given his dizziness and speech symptoms, a tier 2 code stroke was called.  Overall presentation seems more likely to be consistent with an infectious process.  He has a leukocytosis and temperature 99.5.  Given his recent May catheter and urinary symptoms, I am most suspicious for a UTI.  Urinalysis is pending at the  time of admission.  CT also shows possible cerebellar ischemia.  MRI is pending.  Plan is for admission to the hospital service for further care.     DIAGNOSIS:     ICD-10-CM    1. Expressive aphasia  R47.01       2. Dizziness  R42                DISPOSITION:    Admit       6/22/2023  [facility] EMERGENCY DEPT      John Navarro MD  06/22/23 3249

## 2023-06-23 NOTE — CONSULTS
[facility] Inpatient Consultation Note    Don Venegas MRN# [id number]   Age: 72 year old YOB: 1950     Date of Admission: 6/22/2023                 History of Present Illness:   Don Venegas is a pleasant 72 year old year old male who presented on 6/22 for evaluation of dizziness and word finding difficulty.     Urinalysis on admission concerning for infection. Rocephin started and urine culture in process.   Urology consulted today as patient just had his laughlin catheter removed in clinic on 6/20. PVRs have been ordered here and have been high per internal medicine. He was taught CIC due to his persistent retention and was doing this at home prior to admission.     Urology history:   72M with advanced prostate cancer. Had eligaard injection 6/20.    He was recently admitted to [facility] on 4/22, was found to have a small right UVJ stone with calyceal fornix rupture and SHANON. Dr. Cuellar took to OR on 4/23, though was not able to see UO for stent placement due to large, obstructing prostate. She did perform limited TUR with bx of the prostate. Pt did have right PCN placed 4/24, converted to stent on 4/26. Was discharged home with right stent and laughlin in place.     Pathology from the prostate returned with: Adenocarcinoma of prostate origin, acinar type, grade group 5 (Genny score 4 + 5 = 9 of 10).    PSA was 145. Now 4.5 and still falling.     Bone Scan completed 4/26   FINDINGS: Scattered foci of radiotracer uptake throughout the lower thoracic and upper lumbar spine corresponding with subtle areas of in situ sclerosis seen on CT dated 04/22/2023 suspicious for scattered osseous metastases. Additional radiotracer   uptake seen in the cervical spine, shoulders, and knees are typical of degenerative change.      IMPRESSION:      Findings suspicious for scattered osseous metastases involving the lower thoracic and upper lumbar spine      CT abd/pelvis completed 4/27      1. Retroperitoneal  adenopathy compatible with metastatic disease.   2. New percutaneous nephrostomy and ureteral stent on the right with   resolution of right hydronephrosis. Similar left hydronephrosis.   3. New minimal right and trace left effusions with associated   compressive atelectasis and/or infiltrate.          Past Medical History:     Past Medical History:   Diagnosis Date     Prostate cancer (H)              Past Surgical History:     Past Surgical History:   Procedure Laterality Date     CYSTOSCOPY, TRANSURETHRAL RESECTION (TUR) PROSTATE, COMBINED N/A 4/23/2023    Procedure: CYSTOSCOPY, LIMITED TRANSURETHRAL RESECTION OF PROSTATE;  Surgeon: Matthias Cuellar MD;  Location: [facility] OR     IR NEPHROSTOMY TUBE PLACEMENT RIGHT  4/24/2023     IR URETERAL STENT PLACEMENT RIGHT  4/26/2023             Social History:     Social History     Socioeconomic History     Marital status: Single     Spouse name: Not on file     Number of children: Not on file     Years of education: Not on file     Highest education level: Not on file   Occupational History     Not on file   Tobacco Use     Smoking status: Not on file     Smokeless tobacco: Not on file   Substance and Sexual Activity     Alcohol use: Not on file     Drug use: Not on file     Sexual activity: Not on file   Other Topics Concern     Not on file   Social History Narrative     Not on file     Social Determinants of Health     Financial Resource Strain: Not on file   Food Insecurity: Not on file   Transportation Needs: Not on file   Physical Activity: Not on file   Stress: Not on file   Social Connections: Not on file   Intimate Partner Violence: Not on file   Housing Stability: Not on file             Family History:   No family history on file.          Immunizations:     There is no immunization history on file for this patient.          Allergies:     Allergies   Allergen Reactions     Gluten Meal              Medications:     Current Facility-Administered Medications  "  Medication     0.9% sodium chloride BOLUS     abiraterone (ZYTIGA) tablet 1,000 mg     acetaminophen (TYLENOL) tablet 650 mg     cefTRIAXone (ROCEPHIN) 1 g vial to attach to  mL bag for ADULTS or NS 50 mL bag for PEDS     lidocaine (LMX4) cream     lidocaine 1 % 0.1-1 mL     medication instruction - No oral meds if patient didn't pass dysphagia screen     Medication Instructions - Avoid dextrose in IV solutions.     ondansetron (ZOFRAN ODT) ODT tab 4 mg    Or     ondansetron (ZOFRAN) injection 4 mg     predniSONE (DELTASONE) tablet 5 mg     sodium chloride (PF) 0.9% PF flush 3 mL     sodium chloride (PF) 0.9% PF flush 3 mL     sodium chloride 0.9% infusion     tamsulosin (FLOMAX) capsule 0.4 mg             Review of Systems:   Comprehensive review of systems from the Admission note dated 6/22 at [facility] was reviewed with no changes except per HPI.     Examination:  /45 (BP Location: Left arm)   Pulse 74   Temp 97.7  F (36.5  C) (Oral)   Resp 16   Ht 1.778 m (5' 10\")   Wt 62.5 kg (137 lb 12.6 oz)   SpO2 95%   BMI 19.77 kg/m                  Data:     Lab Results   Component Value Date    WBC 16.5 06/23/2023     Lab Results   Component Value Date    RBC 4.13 06/23/2023     Lab Results   Component Value Date    HGB 13.0 06/23/2023     Lab Results   Component Value Date    HCT 37.9 06/23/2023     Lab Results   Component Value Date     06/23/2023     Creatinine   Date Value Ref Range Status   06/23/2023 0.89 0.67 - 1.17 mg/dL Final   ]  Lab Results   Component Value Date    BUN 17.1 06/23/2023       Assessment/Plan:  Don is a 72 year old male with recently diagnosed metastatic prostate cancer, urinary retention, history of nephrolithiaiss with right ureteral stent in situ who presented 6/22 with dizziness. Concern for UTI, possible urosepsis.      - Continue broad spectrum antibiotics pending urine culture results.   - Consider adding blood cultures as well.   - Given " history of retention, just taught CIC, and concern of infection, would place laughlin catheter to prevent recurrent need for straight cathing patient.    - RN may place laughlin. Urojet ordered.   - Monitor urine output.   - Has follow up surgery on 7/11 with Dr. Dey for his prostate.     Marcia Dickson PA-C  [facility]

## 2023-06-23 NOTE — PLAN OF CARE
[facility]      OUTPATIENT OCCUPATIONAL THERAPY  EVALUATION  PLAN OF TREATMENT FOR OUTPATIENT REHABILITATION  (COMPLETE FOR INITIAL CLAIMS ONLY)  Patient's Last Name, First Name, M.I.  YOB: 1950  Don Venegas                          Provider's Name  [facility] Medical Record No.  [id number]                               Onset Date:  06/22/23   Start of Care Date:  06/23/23     Type:     ___PT   _X_OT   ___SLP Medical Diagnosis:  UTI                        OT Diagnosis:  decline in ADL, IADL performance   Visits from SOC:  1   _________________________________________________________________________________  Plan of Treatment/Functional Goals    Planned Interventions: ADL retraining, IADL retraining, cognition, fine motor coordination training, transfer training, visual perception   Goals: See Occupational Therapy Goals on Care Plan in [facility] electronic health record.    Therapy Frequency: Daily  Predicted Duration of Therapy Intervention: 06/25/23  _________________________________________________________________________________    I CERTIFY THE NEED FOR THESE SERVICES FURNISHED UNDER        THIS PLAN OF TREATMENT AND WHILE UNDER MY CARE .             Physician Signature               Date    X_____________________________________________________                  Certification date from: 06/23/23, Certification date to: 06/25/23    Referring Physician: Dr. Luisa Cordova            Initial Assessment        See Occupational Therapy evaluation dated 06/23/23 in Epic electronic health record.

## 2023-06-23 NOTE — ED PROVIDER NOTES
History     Chief Complaint:  Dizziness       HPI   Don Venegas is a 72 year old male with a history of recent prostate cancer diagnosis presents with dizziness and word finding difficulty.  Patient is having word finding difficulty in the room so his sister provides most of the history.    Sister, Kelin, reports that around 2 this afternoon patient developed word finding difficulty and was noted to be not himself.  Patient states that he is also experiencing some dizziness associated with this along with increased fatigue.  Took a 4-hour nap today which is unusual for him.  Patient received Eligard injection on Tuesday and endorsing dizziness then but this resolved.  He was asymptomatic yesterday.  Patient reports feeling weak on his feet but denies unilateral weakness.  Due to his word finding difficulty, he finds describing his dizziness difficult, is not sure if this feels like room spinning or not.  Denies chest pain, shortness of breath, nausea, vomiting, urinary symptoms, cough or respiratory symptoms.     Independent Historian:   Sibling - They report above    Review of External Notes:   None      Medications:    abiraterone (ZYTIGA) 250 MG tablet  Multiple Minerals-Vitamins (PROSTEON) TABS  NONFORMULARY  predniSONE (DELTASONE) 5 MG tablet  tamsulosin (FLOMAX) 0.4 MG capsule        Past Medical History:    Past Medical History:   Diagnosis Date     Prostate cancer (H)        Past Surgical History:    Past Surgical History:   Procedure Laterality Date     CYSTOSCOPY, TRANSURETHRAL RESECTION (TUR) PROSTATE, COMBINED N/A 4/23/2023    Procedure: CYSTOSCOPY, LIMITED TRANSURETHRAL RESECTION OF PROSTATE;  Surgeon: Matthias Cuellar MD;  Location: [facility] OR     IR NEPHROSTOMY TUBE PLACEMENT RIGHT  4/24/2023     IR URETERAL STENT PLACEMENT RIGHT  4/26/2023        Physical Exam     Patient Vitals for the past 24 hrs:   BP Temp Temp src Pulse Resp SpO2   06/22/23 2134 132/62 -- -- 85 -- 99 %   06/22/23 2030 (!)  154/69 99.5  F (37.5  C) Oral 90 18 98 %        Physical Exam  Constitutional: Alert, attentive  HENT:    Nose: Nose normal.    Mouth/Throat: Oropharynx is clear, mucous membranes are moist  Eyes: EOM are normal. Pupils are equal, round, and reactive to light.   CV: Regular rate and rhythm, no murmurs, rubs or gallops.  Chest: Effort normal and breath sounds normal.   GI: No distension. There is no tenderness  MSK: Normal range of motion.   Neurological:   GCS 15; A/Ox3; Cranial nerves 2-12 intact;   5/5 strength throughout the upper and lower extremities;   sensation intact to light touch throughout the upper and lower extremities;   2+ DTRs to the bilateral upper and lower extremities (biceps, BRs, patellar, achilles);   normal fine motor coordination intact bilaterally;   normal gait   Skin: Skin is warm and dry.      Emergency Department Course     ECG results from 06/22/23   EKG 12 lead     Value    Systolic Blood Pressure     Diastolic Blood Pressure     Ventricular Rate 89    Atrial Rate 89    TX Interval 128    QRS Duration 90        QTc 406    P Axis 74    R AXIS -5    T Axis 74    Interpretation ECG      Sinus rhythm  Normal ECG  No previous ECGs available  Confirmed by GENERATED REPORT, COMPUTER ([id number]),  Aasen, Bradley ([id number]) on 6/22/2023 8:56:23 PM           Imaging:  CT Head Perfusion w Contrast - For Tier 2 Stroke   Final Result   IMPRESSION:    CT PERFUSION:   1.  Suggestion of mild area of oligemia right middle cerebellar peduncle.   2.  No definite core infarct identified; suggestive of entire area representing tissue at risk.   3.  MRI head recommended.      Results were called to MILAGROS BLAIR at 6/22/2023 10:01 PM CDT.      CTA Head Neck with Contrast   Final Result   IMPRESSION:    HEAD CTA:   Normal CTA [place].      NECK CTA:   1.  Normal neck CTA.   2.  Several scattered sclerotic lesions throughout visualized vertebrae, the largest in C4; suggestive of sclerotic  metastases.      Results were called to MILAGROS BLAIR at 6/22/2023 10:01 PM CDT.         CT Head w/o Contrast   Final Result   IMPRESSION:   1.  No acute intracranial process.      Results were called to MILAGROS BLAIR at 6/22/2023 10:01 PM CDT.         MR Brain w/o & w Contrast    (Results Pending)   Chest XR,  PA & LAT    (Results Pending)      Report per radiology    Laboratory:  Labs Ordered and Resulted from Time of ED Arrival to Time of ED Departure   COMPREHENSIVE METABOLIC PANEL - Abnormal       Result Value    Sodium 133 (*)     Potassium 3.8      Chloride 96 (*)     Carbon Dioxide (CO2) 23      Anion Gap 14      Urea Nitrogen 21.9      Creatinine 0.97      Calcium 9.2      Glucose 109 (*)     Alkaline Phosphatase 98      AST 18      ALT 18      Protein Total 7.2      Albumin 4.1      Bilirubin Total 0.6      GFR Estimate 83     CBC WITH PLATELETS AND DIFFERENTIAL - Abnormal    WBC Count 14.3 (*)     RBC Count 4.39 (*)     Hemoglobin 13.8      Hematocrit 40.6      MCV 93      MCH 31.4      MCHC 34.0      RDW 11.9      Platelet Count 318      % Neutrophils 75      % Lymphocytes 16      % Monocytes 9      % Eosinophils 0      % Basophils 0      % Immature Granulocytes 0      NRBCs per 100 WBC 0      Absolute Neutrophils 10.5 (*)     Absolute Lymphocytes 2.4      Absolute Monocytes 1.3      Absolute Eosinophils 0.0      Absolute Basophils 0.1      Absolute Immature Granulocytes 0.1      Absolute NRBCs 0.0     ISTAT GASES LACTATE VENOUS POCT - Abnormal    Lactic Acid POCT 0.8      Bicarbonate Venous POCT 29 (*)     O2 Sat, Venous POCT 27 (*)     pCO2 Venous POCT 46      pH Venous POCT 7.41      pO2 Venous POCT 18 (*)    PROCALCITONIN - Abnormal    Procalcitonin 0.11 (*)    TROPONIN T, HIGH SENSITIVITY - Normal    Troponin T, High Sensitivity 8     PARTIAL THROMBOPLASTIN TIME - Normal    aPTT 28     INR - Normal    INR 1.03     URINE MACROSCOPIC WITH REFLEX TO MICRO   GLUCOSE MONITOR NURSING POCT         Procedures   None    Emergency Department Course & Assessments:    Interventions:  Medications   aspirin (ASA) tablet 325 mg (has no administration in time range)   clopidogrel (PLAVIX) tablet 300 mg (has no administration in time range)   Saline Flush - CT (100 mLs Intravenous $Given 6/22/23 2148)   iopamidol (ISOVUE-370) solution 125 mL (125 mLs Intravenous $Given 6/22/23 2148)        Independent Interpretation (X-rays, CTs, rhythm strip):  None    Consultations/Discussion of Management or Tests:  ED Course as of 06/23/23 0012   Thu Jun 22, 2023 2131 Performed initial assessment   2136 Tier 2 stroke called   2139 Consulted with Sally Hutton with stroke neuro   2203 Spoke with [facility] regarding patient's imaging   2214 Reassessed patient and discussed results   2219 Spoke with [facility] once more   2245 Consulted with Dr. Cordova with inpatient hospitalist service       Social Determinants of Health affecting care:   None    Disposition:  The patient was admitted to the hospital under the care of Dr. Cordova.     Impression & Plan      Medical Decision Making:  Don Venegas is a 72 year old male who presents with expressive aphasia and dizziness.  Neuro exam here completely benign, however speech is significantly delayed.  He is able to answer questions appropriately with short answers.  Tier 2 stroke called.  CT showed possible concern for decreased blood flow to region of right middle cerebral peduncle however this would not correlate with patient's expressive aphasia.  CT also showed scattered sclerotic lesions, largest at C4, concerning for sclerotic metastases.  Discussed the results of this with the patient.  Patient and sister were unaware of spinal involvement but suspected this may be going on.  Consulted with stroke neurology Dr. Hutton who recommended de-escalating code stroke after reviewing imaging and recommends admitting for TIA work-up.  MRI ordered.  Provided patient  with loading dose of aspirin and Plavix.  Also considered possible infectious etiology to patient's symptoms as he appears to have mild elevation to his temp at 99.5 upon arrival with elevated white count of 14.  Patient noted some difficulty with urination, UA will be completed when patient is able to.  Pro-Gulshan is elevated at 0.11.  Chest x-ray ordered to rule out respiratory infection, however patient is without respiratory symptoms and is satting well on room air.  Consulted with Dr. Cordova with inpatient hospitalist service who is in agreement with patient's admission and will be taking over for further work-up and cares.      Diagnosis:    ICD-10-CM    1. Expressive aphasia  R47.01       2. Dizziness  R42            Discharge Medications:  New Prescriptions    No medications on file          6/22/2023   Guadalupe Contreras, Guadalupe Castro, CON  06/23/23 0012

## 2023-06-23 NOTE — PHARMACY-ADMISSION MEDICATION HISTORY
Pharmacist Admission Medication History    Admission medication history is complete. The information provided in this note is only as accurate as the sources available at the time of the update.    Medication reconciliation/reorder completed by provider prior to medication history? No    Information Source(s): Patient, Clinic records and CareEverywhere/SureScripts via in-person    Pertinent Information:   - Docusate: Pt did not find effective, removed from list.  - Abiraterone: Pt and pt sister aware that they will need to bring in own medication.    Changes made to PTA medication list:    Added:   o Abiraterone  o Prednisone    Deleted:   o Cephalexin (finished course)  o Docusate    Changed: None    Medication Affordability:       Allergies reviewed with patient and updates made in EHR: yes    Medication History Completed By: Graciela Sherwood [facility] 6/22/2023 10:33 PM    Prior to Admission medications    Medication Sig Last Dose Taking? Auth Provider Long Term End Date   abiraterone (ZYTIGA) 250 MG tablet Take 1,000 mg by mouth daily 6/22/2023 at am Yes Unknown, Entered By History     Multiple Minerals-Vitamins (PROSTEON) TABS Take 1 capsule by mouth daily  Yes Unknown, Entered By History     NONFORMULARY Patient takes a mens herbal supplement daily which includes saw palmetto.  Yes Unknown, Entered By History     predniSONE (DELTASONE) 5 MG tablet Take 5 mg by mouth 2 times daily 6/22/2023 at x1 Yes Unknown, Entered By History     tamsulosin (FLOMAX) 0.4 MG capsule Take 1 capsule (0.4 mg) by mouth daily Future refills to Urology, Dr. Dey 6/22/2023 at am Yes Alexandre Redmond MD

## 2023-06-23 NOTE — PROGRESS NOTES
23 1122   Appointment Info   Signing Clinician's Name / Credentials (OT) Chikis Blackwood, OTR/L   Living Environment   People in Home alone   Current Living Arrangements apartment   Home Accessibility   (pt could not remember)   Transportation Anticipated car, drives self   Living Environment Comments Pt provided above info, however was confused and could not respond to all inquiries. Could not give reply as to if had tub/shower or walk-in shower   Self-Care   Current Activity Tolerance fair   Activity/Exercise/Self-Care Comment Pt fatigued at time of eval, may not be reliable historian as demonstrated confusion. He did state that he is indep with all ADL, IADL including driving - he could not remember how many medications he takes nor give specifics regarding other tasks.   General Information   Onset of Illness/Injury or Date of Surgery 23   Referring Physician Dr. Luisa Cordova   Patient/Family Therapy Goal Statement (OT) did not state   Additional Occupational Profile Info/Pertinent History of Current Problem From chart: 72 year old male with a recent history of prostate cancer status post TURP on , status post right nephrostomy tube placement with IR which has since been removed, urinary retention with a May catheter which was removed on  who presents to hospital with dizziness and word finding difficulty. Initial concern was for stroke , however imaging negative but noted with likely UTI .   Existing Precautions/Restrictions fall  (orthostatic drop in BP)   Limitations/Impairments safety/cognitive   Cognitive Status Examination   Orientation Status person;other (see comments)   Follows Commands follows one-step commands;over 90% accuracy   Cognitive Status Comments Pt confused - oriented intermittently. Unable to state  (stated month but not date or year) yet did so at completion of assessment when asked again.   Visual Perception   Impact of Vision Impairment on  Function (Vision) Assessment difficult as pt not following directives consistently - he denied any visual changes but was unable to locate objects in left field and superior fields.   Pain Assessment   Patient Currently in Pain No   Range of Motion Comprehensive   General Range of Motion no range of motion deficits identified   Strength Comprehensive (MMT)   General Manual Muscle Testing (MMT) Assessment no strength deficits identified   Coordination   Coordination Comments B FMC slow but appears functional - needs further assessment   Bed Mobility   Comment (Bed Mobility) SBA   Transfers   Transfer Comments Sit-stand from bed initially Min A however improved to CGA   Balance   Balance Comments Amb around room only with pt pushing IV pole, CGA provided as pt's gait deviated at times but no LOB.   Activities of Daily Living   BADL Assessment/Intervention toileting   Toileting   Comment, (Toileting) Pt did not know how to use urinal requiring A with clothing mgmt and placement of urinal in hand along with vc's in standing   Clinical Impression   Criteria for Skilled Therapeutic Interventions Met (OT) Yes, treatment indicated   OT Diagnosis decline in ADL, IADL performance   OT Problem List-Impairments impacting ADL problems related to;activity tolerance impaired;cognition   Assessment of Occupational Performance 5 or more Performance Deficits   Identified Performance Deficits functional mobility, grooming, toileting, dressing, , bathing, HH management, community mobility   Planned Therapy Interventions (OT) ADL retraining;IADL retraining;cognition;fine motor coordination training;transfer training;visual perception   Clinical Decision Making Complexity (OT) moderate complexity   Risk & Benefits of therapy have been explained evaluation/treatment results reviewed;care plan/treatment goals reviewed;risks/benefits reviewed;current/potential barriers reviewed;participants voiced agreement with care plan;participants  included;patient   OT Total Evaluation Time   OT Eval, Moderate Complexity Minutes (88923) 20   OT Goals   Therapy Frequency (OT) Daily   OT Predicted Duration/Target Date for Goal Attainment 06/25/23   OT Goals Cognition;Transfers;OT Goal 1   OT: Transfer Independent  (All functional transfers (bed, toilet, chairs, stairs))   OT: Cognitive Patient/caregiver will verbalize understanding of cognitive assessment results/recommendations as needed for safe discharge planning   OT: Goal 1 Patient will demo ability to perform all self-care tasks independently and safely in room as requisite to return to [place].   Interventions   Interventions Quick Adds Self-Care/Home Management;Therapeutic Activity   Self-Care/Home Management   Self-Care/Home Mgmt/ADL, Compensatory, Meal Prep Minutes (53732) 28   Treatment Detail/Skilled Intervention OT: pt sleeping soundly upon arrival and difficult to awaken then had difficulty keeping eyes open and maintaining attention. Attention did improve once up. Pt oriented to self (after 2-3 attempts to state name) and to birth month but could not name date or year of birth until end of session. Initially disoriented to place, date however improved to be able to state full date and place. When asked to read time on clock patient unable to find clock on wall - given cues and 5min and still unable and clock on left wall in pt's superior field. While scanning room he did not once look up past sight midline level until cued. He then scanned ceiling requiring cues to lower gaze but still did not locate clock. Attempted to assess visual fields and pt did locate light (used penlight) x 4Q following those directions effectively so does not appear to be visual but cognitive issue.  Pt performed sup<>sit SBA. Sat EOB and doffed/donned slipper socks using figure 4 position without difficulty. When standing to urinate - he did not know how to use urinal, required A with gown and underwear and passive placement  "of urinal in his hand and CGA for standing balance. Task done at EOB due to pt's orthostatic issue (see theract note below). At completion of session, pt returned to supine and nursing present to do bladder scan. VS taken throughout -see TherAct note,   Therapeutic Activities   Therapeutic Activity Minutes (13867) 10   Symptoms noted during/after treatment significant change in vital signs;fatigue   Treatment Detail/Skilled Intervention OT: See VS flowsheet as orthostatic BPs taken and pt had sig changes. Sit-stand from bed initially Min A as pt unsteady but improved and on subsequent trials CGA for safety only but no LOB. Pt amb around room pushing IV pole CGA and initially had some gait deviation but that improved the longer up, while CGA provided he had no overt LOB. Stand<>sit on lower couch required Min A to stand but again improved 2nd trial to CGA. Time includes monitoring of BP.   OT Discharge Planning   OT Plan SLUMS, screen mobilities for PT (they have on at 7P) if pt's cog function clearing and orthostatic BP's better to allow for amb to satellite to check stairs) Also, obtain more reliable baseline info if cog more clear. Could also add a med mgmt goal.   OT Discharge Recommendation (DC Rec) home with assist;Transitional Care Facility   OT Rationale for DC Rec Pt lives alone and is indep all ADL, IADL, mob at baseline however currently his level of confusion is affecting performance. Anticipate with medical management of UTI patient will \"clear\" to be able to return home but if not will need either assist at home or TCU stay. OT to re-assess tomorrow and will do cognitive assessment with futher discharge recommend to follow.   OT Brief overview of current status Pt confused, \"spacey\", requiring A with managing toileting, unable to give accurate report of simple baseline information. SBA bed mob, amb in room pushing IV pole CGA, however BP drops significantly when up. Orthostatic BP's taken and dropped " from 150/55 to 88/47.

## 2023-06-23 NOTE — ED NOTES
[facility]  ED Nurse Handoff Report    ED Chief complaint: Dizziness      ED Diagnosis:   Final diagnoses:   Expressive aphasia   Dizziness       Code Status: MD to discuss    Allergies:   Allergies   Allergen Reactions     Gluten Meal        Patient Story: Pt comes in with word finding difficulty and dizziness around 1400. Neuro's otherwise intact. Hx of prostate CA w/ mets to bone and lymph.  Focused Assessment:  Continues to have word finding difficulty, generalized weakness and fatigue.    Treatments and/or interventions provided: Tier 2 stroke called, aspirin and plavix given  Patient's response to treatments and/or interventions:     To be done/followed up on inpatient unit:  F/U echo, UA    Does this patient have any cognitive concerns?: Forgetful    Activity level - Baseline/Home:  Independent  Activity Level - Current:   Stand with Assist    Patient's Preferred language: English   Needed?: No    Isolation: None  Infection: Not Applicable    Patient tested for COVID 19 prior to admission: NO  Bariatric?: No    Vital Signs:   Vitals:    06/22/23 2030 06/22/23 2134 06/22/23 2230 06/23/23 0016   BP: (!) 154/69 132/62 119/56 123/61   Pulse: 90 85 92 87   Resp: 18  14 19   Temp: 99.5  F (37.5  C)      TempSrc: Oral      SpO2: 98% 99% 97%        Cardiac Rhythm:     Was the PSS-3 completed:   Yes  What interventions are required if any?               Family Comments: Sister at bedside  OBS brochure/video discussed/provided to patient/family: N/A              Name of person given brochure if not patient:               Relationship to patient:     For the majority of the shift this patient's behavior was Green.   Behavioral interventions performed were None needed.    ED NURSE PHONE NUMBER: [phone]

## 2023-06-23 NOTE — PROVIDER NOTIFICATION
MD Notification    Notified Person: MD    Notified Person Name: Luisa Cordova    Notification Date/Time: 6/23/23    Notification Interaction: paged amcom    Purpose of Notification: 715 - RK    FYI pt's UA results are back, please advise    Shira, [phone]    Orders Received: IV rocephin, Urine cultures ordered

## 2023-06-23 NOTE — CONSULTS
[facility]    Stroke Consult Note    Reason for Consult:  Stroke mimic    Chief Complaint: Dizziness       HPI  Don Venegas is a 73 YO M w/no known vascular RFs and PMHx sig for metastatic prostate ca (bone) s/p TURP on hormone therapy who presents on 6/22 for speech disturbance, dizziness, encephalopathy.     Dizziness ongoing for days and associated with position changes (sitting to standing).     He came in as a stroke code activation - no acute interventions.    Initial labs with WBC 14, Na 133, pro-rené 0.11, UA with WBC > 182, +bacteria, large LE, nitritie+, large blood.    Stroke Evaluation Summarized    MRI/Head CT MRI  1.  No acute infarct, mass, mass effect, or hemorrhage.  2.  Mild atrophy.  3.  Low T1 signal lesion within the right aspect of C4, indeterminate; compatible with patient's known history of metastatic prostate carcinoma.   Intracranial Vasculature HEAD CTA:  Normal CTA [place].   Cervical Vasculature NECK CTA:  1.  Normal neck CTA.  2.  Several scattered sclerotic lesions throughout visualized vertebrae, the largest in C4; suggestive of sclerotic metastases.     Echocardiogram NA   EKG/Telemetry Sinus rhythm   Other Testing Not Applicable      LDL  6/22/2023: 87 mg/dL   A1C  6/22/2023: 5.3 %   Troponin 6/22/2023: 8 ng/L       Impression/Recommendations  # Stroke mimic, toxo-metabolic encephalopathy, concern for orthostatic hypotension. HPI/exam more c/w encephalopathy rather than aphasia. Additionally, given negative MRI and ongoing symptoms, highly unlikely that this represents a neurovascular process. UA/leukocystosis somewhat suspicious for UTI. Positional dizziness suspicious for orthostatic hypotension.   - No need to initiate new APT  - No further stroke workup recommended  - Stroke Neurology signing off     Thank you for this consult. No further stroke evaluation is recommended, so we will sign off. Please contact us with any additional  "questions.    The Stroke Staff is Dr. Troy.    Ebenezer Swift MD  Vascular Neurology Fellow    To page me or covering stroke neurology team member, click here: AMCOM  Choose \"On Call\" tab at top, then select \"NEUROLOGY/ALL SITES\" from middle drop-down box, press Enter, then look for \"stroke\" or \"telestroke\" for your site.    _____________________________________________________    Clinically Significant Risk Factors Present on Admission                                   Past Medical History    Past Medical History:   Diagnosis Date     Prostate cancer (H)      Medications   Home Meds  Prior to Admission medications    Medication Sig Start Date End Date Taking? Authorizing Provider   abiraterone (ZYTIGA) 250 MG tablet Take 1,000 mg by mouth daily   Yes Unknown, Entered By History   Multiple Minerals-Vitamins (PROSTEON) TABS Take 1 capsule by mouth daily   Yes Unknown, Entered By History   NONFORMULARY Patient takes a mens herbal supplement daily which includes saw palmetto.   Yes Unknown, Entered By History   predniSONE (DELTASONE) 5 MG tablet Take 5 mg by mouth 2 times daily   Yes Unknown, Entered By History   tamsulosin (FLOMAX) 0.4 MG capsule Take 1 capsule (0.4 mg) by mouth daily Future refills to Urology, Dr. Dey 4/28/23  Yes Alexandre Redmond MD       Scheduled Meds    sodium chloride 0.9%  500 mL Intravenous Once     abiraterone  1,000 mg Oral Daily     cefTRIAXone  1 g Intravenous Q24H     predniSONE  5 mg Oral BID     sodium chloride (PF)  3 mL Intracatheter Q8H     tamsulosin  0.4 mg Oral Daily       Infusion Meds    - MEDICATION INSTRUCTIONS -       - MEDICATION INSTRUCTIONS -       sodium chloride         Allergies   Allergies   Allergen Reactions     Gluten Meal           PHYSICAL EXAMINATION   Temp:  [97.7  F (36.5  C)-102.8  F (39.3  C)] 97.7  F (36.5  C)  Pulse:  [74-96] 74  Resp:  [13-19] 16  BP: (105-154)/(45-69) 105/45  SpO2:  [95 %-99 %] 95 %    Neuro Exam  MS: AO x4, fluent speech " with some pausing to think through response/words, deficits in attention (digit span), naming/memory/comprehension intact  CN: EOEMI, VFF bilat, symmetric facial movements  Motor: BUE, BLE 5/5  Sensory: LT intact in all 4 limbs  Coordination: FTN, HTS intact bilat    Imaging  I personally reviewed all imaging; relevant findings per HPI.    Labs Data   CBC  Recent Labs   Lab 06/23/23 0812 06/22/23 2042   WBC 16.5* 14.3*   RBC 4.13* 4.39*   HGB 13.0* 13.8   HCT 37.9* 40.6    318     Basic Metabolic Panel   Recent Labs   Lab 06/23/23  0812 06/23/23 0728 06/22/23 2042     --  133*   POTASSIUM 3.4  --  3.8   CHLORIDE 101  --  96*   CO2 22  --  23   BUN 17.1  --  21.9   CR 0.89  --  0.97   * 93 109*   MIKY 8.7*  --  9.2     Liver Panel  Recent Labs   Lab 06/22/23 2042   PROTTOTAL 7.2   ALBUMIN 4.1   BILITOTAL 0.6   ALKPHOS 98   AST 18   ALT 18     INR    Recent Labs   Lab Test 06/22/23 2042 04/23/23  2254   INR 1.03 1.18*

## 2023-06-23 NOTE — TELEPHONE ENCOUNTER
Patient sister calling and patient gave verbal consent to communicate but initially gave address instead of birth date.  Reporting this afternoon he has been dizzy and tired and having some confusion.    Disposition is to call 911.  Rational explained to caller who verbalized understanding and agrees with plan.    Isabel Tan RN  [facility]      Reason for Disposition    Difficult to awaken or acting confused (e.g., disoriented, slurred speech)    Additional Information    Negative: SEVERE difficulty breathing (e.g., struggling for each breath, speaks in single words)    Negative: [1] Difficulty breathing or swallowing AND [2] started suddenly after medicine, an allergic food or bee sting    Negative: Shock suspected (e.g., cold/pale/clammy skin, too weak to stand, low BP, rapid pulse)    Protocols used: DIZZINESS - [id number]

## 2023-06-23 NOTE — PROGRESS NOTES
RECEIVING UNIT ED HANDOFF REVIEW    ED Nurse Handoff Report was reviewed by: Shira Hurley RN on June 23, 2023 at 2:04 AM

## 2023-06-23 NOTE — H&P
[facility]    History and Physical  Hospitalist     Date of Admission:  6/22/2023    Assessment & Plan   Don Venegas is a 72 year old male with a recent medical history of prostate cancer status post TURP on April 23, status post right nephrostomy tube placement with IR which has since been removed, urinary retention with a May catheter which was removed on Tuesday, June 20 presents to hospital with dizziness and word finding difficulty.     Dizzyness  Expressive aphasia  Likely UTI with metabolic encephalopathy  Patient presenting with dizziness and expressive aphasia since 2 PM on Thursday.  2 to stroke code was called in the emergency room.  CT scan and CTA did not reveal any acute pathology. Stroke neuro was contacted emergency room and records have been appreciated.  The patient received aspirin and Plavix in the emergency room. MRI negative for an acute stroke. UA concerning for infection  -ceftriaxone  -f/u urine culture  -Follow-up echocardiogram  -Monitor on telemetry  -Follow lipid profile, A1c  -Follow-up neurology consult, PT, OT, speech    Metastatic Prostate cancer  Status post TURP  Follows with urology    Code Status   Full Code  DVT ppx: SCDs  Expected length of stay less than 2 days    Clinically Significant Risk Factors Present on Admission                                    Primary Care Physician   Physician No Ref-Primary    Chief Complaint   Dizziness    History obtained from the patient    History of Present Illness   Don Venegas is a 72 year old male with a recent medical history of prostate cancer status post TURP on April 23, status post right nephrostomy tube placement with IR which has since been removed, urinary retention with a May catheter which was removed on Tuesday, June 20 presents to hospital with dizziness and word finding difficulty.  He reports that he was in his usual state of health until Thursday afternoon when he started experiencing  generalized fatigue, dizziness described as a sensation of lightheadedness, word finding difficulty.  He decided take a nap around 3 PM and woke up around 6 PM with ongoing symptoms so he presented to the emergency room.  In the emergency room a tier 2 code stroke was called and work-up so far has been revealing.  During my interview the patient continues to have word finding difficulties.  Patient reports that he started treatment for his prostate cancer with Eligard on Tuesday.  Since his May catheter removal on Tuesday he reports he has been experiencing mild dysuria and reports that his urine appears darker than normal.  He provides no other complaints.    Past Medical History    I have reviewed this patient's medical history and updated it with pertinent information if needed.   No past medical history on file.  Prostate cancer    Past Surgical History   I have reviewed this patient's surgical history and updated it with pertinent information if needed.  Past Surgical History:   Procedure Laterality Date     CYSTOSCOPY, TRANSURETHRAL RESECTION (TUR) PROSTATE, COMBINED N/A 4/23/2023    Procedure: CYSTOSCOPY, LIMITED TRANSURETHRAL RESECTION OF PROSTATE;  Surgeon: Matthias Cuellar MD;  Location: [facility] OR     IR NEPHROSTOMY TUBE PLACEMENT RIGHT  4/24/2023     IR URETERAL STENT PLACEMENT RIGHT  4/26/2023       Allergies   Allergies   Allergen Reactions     Gluten Meal        Social History   I have reviewed this patient's social history and updated it with pertinent information if needed. Don Venegas      Family History   I have reviewed this patient's family history and updated it with pertinent information if needed.   He denies any significant family history of strokes or heart disease.    Review of Systems   The 10 point Review of Systems is negative other than noted in the HPI or here.     Physical Exam   Temp: 99.5  F (37.5  C) Temp src: Oral BP: 132/62 Pulse: 85   Resp: 18 SpO2: 99 % O2 Device: None  (Room air)    Vital Signs with Ranges  Temp:  [99.5  F (37.5  C)] 99.5  F (37.5  C)  Pulse:  [85-90] 85  Resp:  [18] 18  BP: (132-154)/(62-69) 132/62  SpO2:  [98 %-99 %] 99 %  0 lbs 0 oz  Physical Exam  Vitals reviewed.   Constitutional:       Appearance: Normal appearance.      Comments: Pleasant man seen resting in bed comfortably no apparent distress in emergency room accompanied by his wife is bedside.  Patient is well-developed and well-nourished.   HENT:      Head: Normocephalic and atraumatic.      Mouth/Throat:      Mouth: Mucous membranes are moist.      Pharynx: Oropharynx is clear.   Eyes:      Extraocular Movements: Extraocular movements intact.      Conjunctiva/sclera: Conjunctivae normal.      Pupils: Pupils are equal, round, and reactive to light.   Cardiovascular:      Rate and Rhythm: Normal rate and regular rhythm.      Pulses: Normal pulses.      Heart sounds: Normal heart sounds. No murmur heard.  Pulmonary:      Effort: Pulmonary effort is normal.      Breath sounds: Normal breath sounds. No wheezing, rhonchi or rales.   Abdominal:      General: Abdomen is flat. Bowel sounds are normal. There is no distension.      Palpations: Abdomen is soft. There is no mass.      Tenderness: There is no abdominal tenderness.   Musculoskeletal:         General: No swelling. Normal range of motion.      Cervical back: Normal range of motion and neck supple.   Skin:     General: Skin is warm and dry.   Neurological:      General: No focal deficit present.      Mental Status: He is alert and oriented to person, place, and time. Mental status is at baseline.      Cranial Nerves: No cranial nerve deficit.      Sensory: No sensory deficit.      Motor: No weakness.      Coordination: Coordination normal.      Comments: Patient with an expressive aphasia has difficulty finding certain words such as emergency room.           Medical Decision Making     Total time spent greater than 75 minutes

## 2023-06-24 ENCOUNTER — APPOINTMENT (OUTPATIENT)
Dept: OCCUPATIONAL THERAPY | Facility: CLINIC | Age: 73
DRG: 689 | End: 2023-06-24
Payer: MEDICARE

## 2023-06-24 PROBLEM — N39.0 UTI (URINARY TRACT INFECTION): Status: ACTIVE | Noted: 2023-06-24

## 2023-06-24 LAB
ANION GAP SERPL CALCULATED.3IONS-SCNC: 10 MMOL/L (ref 7–15)
BACTERIA UR CULT: NORMAL
BASOPHILS # BLD AUTO: 0.1 10E3/UL (ref 0–0.2)
BASOPHILS NFR BLD AUTO: 0 %
BUN SERPL-MCNC: 11.8 MG/DL (ref 8–23)
CALCIUM SERPL-MCNC: 8.6 MG/DL (ref 8.8–10.2)
CHLORIDE SERPL-SCNC: 108 MMOL/L (ref 98–107)
CREAT SERPL-MCNC: 0.74 MG/DL (ref 0.67–1.17)
DEPRECATED HCO3 PLAS-SCNC: 20 MMOL/L (ref 22–29)
EOSINOPHIL # BLD AUTO: 0 10E3/UL (ref 0–0.7)
EOSINOPHIL NFR BLD AUTO: 0 %
ERYTHROCYTE [DISTWIDTH] IN BLOOD BY AUTOMATED COUNT: 12.3 % (ref 10–15)
GFR SERPL CREATININE-BSD FRML MDRD: >90 ML/MIN/1.73M2
GLUCOSE BLDC GLUCOMTR-MCNC: 124 MG/DL (ref 70–99)
GLUCOSE BLDC GLUCOMTR-MCNC: 98 MG/DL (ref 70–99)
GLUCOSE BLDC GLUCOMTR-MCNC: 99 MG/DL (ref 70–99)
GLUCOSE SERPL-MCNC: 106 MG/DL (ref 70–99)
HCT VFR BLD AUTO: 35.5 % (ref 40–53)
HGB BLD-MCNC: 12.2 G/DL (ref 13.3–17.7)
IMM GRANULOCYTES # BLD: 0.1 10E3/UL
IMM GRANULOCYTES NFR BLD: 1 %
LYMPHOCYTES # BLD AUTO: 1.9 10E3/UL (ref 0.8–5.3)
LYMPHOCYTES NFR BLD AUTO: 13 %
MCH RBC QN AUTO: 31.8 PG (ref 26.5–33)
MCHC RBC AUTO-ENTMCNC: 34.4 G/DL (ref 31.5–36.5)
MCV RBC AUTO: 92 FL (ref 78–100)
MONOCYTES # BLD AUTO: 1.2 10E3/UL (ref 0–1.3)
MONOCYTES NFR BLD AUTO: 8 %
NEUTROPHILS # BLD AUTO: 11.8 10E3/UL (ref 1.6–8.3)
NEUTROPHILS NFR BLD AUTO: 78 %
NRBC # BLD AUTO: 0 10E3/UL
NRBC BLD AUTO-RTO: 0 /100
PLATELET # BLD AUTO: 202 10E3/UL (ref 150–450)
POTASSIUM SERPL-SCNC: 3.7 MMOL/L (ref 3.4–5.3)
RBC # BLD AUTO: 3.84 10E6/UL (ref 4.4–5.9)
SODIUM SERPL-SCNC: 138 MMOL/L (ref 136–145)
WBC # BLD AUTO: 15.1 10E3/UL (ref 4–11)

## 2023-06-24 PROCEDURE — 120N000001 HC R&B MED SURG/OB

## 2023-06-24 PROCEDURE — 97535 SELF CARE MNGMENT TRAINING: CPT | Mod: GO

## 2023-06-24 PROCEDURE — 99232 SBSQ HOSP IP/OBS MODERATE 35: CPT | Performed by: HOSPITALIST

## 2023-06-24 PROCEDURE — 250N000013 HC RX MED GY IP 250 OP 250 PS 637: Performed by: HOSPITALIST

## 2023-06-24 PROCEDURE — 36415 COLL VENOUS BLD VENIPUNCTURE: CPT | Performed by: HOSPITALIST

## 2023-06-24 PROCEDURE — 250N000011 HC RX IP 250 OP 636: Mod: JZ | Performed by: HOSPITALIST

## 2023-06-24 PROCEDURE — 82310 ASSAY OF CALCIUM: CPT | Performed by: HOSPITALIST

## 2023-06-24 PROCEDURE — 85014 HEMATOCRIT: CPT | Performed by: HOSPITALIST

## 2023-06-24 PROCEDURE — 250N000012 HC RX MED GY IP 250 OP 636 PS 637: Performed by: HOSPITALIST

## 2023-06-24 RX ADMIN — PREDNISONE 5 MG: 5 TABLET ORAL at 20:35

## 2023-06-24 RX ADMIN — TAMSULOSIN HYDROCHLORIDE 0.4 MG: 0.4 CAPSULE ORAL at 08:31

## 2023-06-24 RX ADMIN — ABIRATERONE 1000 MG: 250 TABLET ORAL at 08:31

## 2023-06-24 RX ADMIN — PREDNISONE 5 MG: 5 TABLET ORAL at 08:31

## 2023-06-24 RX ADMIN — CEFTRIAXONE SODIUM 1 G: 1 INJECTION, POWDER, FOR SOLUTION INTRAMUSCULAR; INTRAVENOUS at 04:36

## 2023-06-24 ASSESSMENT — ACTIVITIES OF DAILY LIVING (ADL)
ADLS_ACUITY_SCORE: 25
ADLS_ACUITY_SCORE: 37
ADLS_ACUITY_SCORE: 37
DRESSING/BATHING_DIFFICULTY: NO
ADLS_ACUITY_SCORE: 37
ADLS_ACUITY_SCORE: 25
ADLS_ACUITY_SCORE: 25
ADLS_ACUITY_SCORE: 37
ADLS_ACUITY_SCORE: 25
CHANGE_IN_FUNCTIONAL_STATUS_SINCE_ONSET_OF_CURRENT_ILLNESS/INJURY: NO
WALKING_OR_CLIMBING_STAIRS_DIFFICULTY: NO
FALL_HISTORY_WITHIN_LAST_SIX_MONTHS: NO
ADLS_ACUITY_SCORE: 25
DOING_ERRANDS_INDEPENDENTLY_DIFFICULTY: NO
ADLS_ACUITY_SCORE: 25
ADLS_ACUITY_SCORE: 25
DIFFICULTY_EATING/SWALLOWING: NO
ADLS_ACUITY_SCORE: 25
TOILETING_ISSUES: NO
CONCENTRATING,_REMEMBERING_OR_MAKING_DECISIONS_DIFFICULTY: NO
WEAR_GLASSES_OR_BLIND: YES

## 2023-06-24 NOTE — PROGRESS NOTES
[facility]    Medicine Progress Note - Hospitalist Service    Date of Admission:  6/22/2023    Assessment & Plan   72 year old male  Past medical history of metastatic prostate cancer, benign prostatic hypertrophy, urinary retention, constipation.  Admitted with dizziness, aphasia, and acute encephalopathy initially concerning for stroke, later consistent with urinary tract infection (UTI).    Acute metabolic encephalopathy  Urinary tract infection (UTI)  Leukocytosis  Dizziness, expressive aphasia - resolving  Orthostatic hypotension  Metastatic prostate cancer  Benign prostatic hypertrophy (BPH)   History of urinary retention  Hyponatremia, mild  Weakness and deconditioning  - Admitted with dizziness, aphasia, and acute encephalopathy initially concerning for stroke, later consistent with urinary tract infection (UTI).  Stroke neurology consulted and MRI head negative for stroke.  Toxic-metabolic encephalopathy felt most likely due to lower blood pressure, urinary tract infection (UTI), and unlikely neurovascular process, see note 6/23, signed off.  No further stroke workup recommended with ongoing treatment of urinary tract infection.  - Urology consulted 6/23 for urinary retention and prostate cancer history.  Treatment of urinary tract infection, cultures, and continued May placement with outpatient follow-up.  Has follow-up surgery on 7/11 with Dr. Dey, see note 6/23.    Continue IV Rocephin    Follow-up urine and blood cultures    Neuro checks; monitor mental status, overall improving    Monitor blood pressure, encourage oral fluid intake, avoid orthostasis    Continue May catheter, as per urology    Continue Flomax, Zytiga, prednisone    Monitor serum sodium, 138 on 6/24 (normal)    Discontinued cardiac telemetry 6/24    Physical therapy (PT), occupational therapy (OT) for weakness and deconditioning    Outpatient follow-up and management of prostate cancer as per  "urology, upcoming appointment 7/11 as above    AM labs as ordered including CBC, basic; WBC remains elevated, 15.1k on 6/24    Disposition    Estimated length of stay 48 hours    Anticipated discharge to home    Social work consult       Diet: Combination Diet Regular Diet    DVT Prophylaxis: Pneumatic Compression Devices  May Catheter: PRESENT, indication: Retention  Lines: None     Cardiac Monitoring: ACTIVE order. Indication: Stroke, acute (48 hours)  Code Status: Full Code      Clinically Significant Risk Factors Present on Admission                                Disposition Plan      Expected Discharge Date: 06/25/2023                  Alexandre Redmond MD  Hospitalist Service  [facility]  Securely message with [employer] (more info)  Text page via [employer] Paging/Directory   ______________________________________________________________________    Interval History   First visit with patient today. Lying in bed, appearing comfortable, overall \"feels fine\".  No new complaints.    Physical Exam   Vital Signs: Temp: 99  F (37.2  C) Temp src: Oral BP: 117/53 Pulse: 84   Resp: 16 SpO2: 98 % O2 Device: None (Room air)    Weight: 137 lbs 12.6 oz    GENERAL awake and alert, no acute distress, lying in bed   HEENT no conjunctival injection or jaundice  LUNGS moderate inspiratory effort, no wheezes or crackles  HEART S1, S2 regular rate and rhythm; no rubs or gallops  ABDOMEN soft, nontender to palpation; no guarding, rebound, or rigidity  MUSCULOSKELETAL extremities without edema  SKIN warm and dry  NEURO moves upper and lower extremities spontaneously and to command  MENTAL STATUS answering questions and following simple commands    Medical Decision Making             Data     "

## 2023-06-24 NOTE — PLAN OF CARE
Date & Time: 6/24 0700-1900  Diagnosis: Dizziness  Procedures: NA  Orientation/Cognitive: AOx4  VS/O2: VSS, RA  Mobility: SBA/independent  Diet: Regular  Pain Management: C/o generalized aching - declines meds  Bowel & Bladder: May (retention), BMx1  Skin: WDL  Abnormal Labs: WBC 15.1  Tele: NSR  IV Access/Drips/Fluids: L PIV SL, R PIV SL  Drains: May - patent  Tests: CT & MRI - negative, UTI - positive  Consults: Neurology, hospitalist  Discharge Plan: Pending - home   Other: Neuros intact ex very minimal WFD

## 2023-06-24 NOTE — PLAN OF CARE
Reason for Admission: Dizziness     Cognitive/Mentation: A/Ox 4  Neuros/CMS: Intact ex some confusion and word finding at times  VS: stable. Temp of 100.7 was resolved with tylenol   Tele: SR.  GI: BS active, passing flatus. Continent.  : May for retention.  Pulmonary: LS clear.  Pain: denies.     Drains/Lines: NS @ 100mL/hr  Skin: intact  Activity: Assist x 1 with GB and walker.  Diet: regular with thin liquids. Takes pills whole.     Therapies recs: pending   Discharge: pending    Aggression Stoplight Tool: green

## 2023-06-24 NOTE — PLAN OF CARE
Physical Therapy: Orders received. Chart reviewed and discussed with care team.? Physical Therapy not indicated following discussion with OT. Patient mobilizing well with SBA and no assistive device with OT, no instability noted. OT will continue following patient while hospitalized to progress activity tolerance and overall independence with functional mobility. Defer discharge recommendations to OT.? Will complete orders.

## 2023-06-25 LAB
BASOPHILS # BLD AUTO: 0 10E3/UL (ref 0–0.2)
BASOPHILS NFR BLD AUTO: 0 %
EOSINOPHIL # BLD AUTO: 0 10E3/UL (ref 0–0.7)
EOSINOPHIL NFR BLD AUTO: 0 %
ERYTHROCYTE [DISTWIDTH] IN BLOOD BY AUTOMATED COUNT: 12.2 % (ref 10–15)
HCT VFR BLD AUTO: 34.5 % (ref 40–53)
HGB BLD-MCNC: 11.5 G/DL (ref 13.3–17.7)
IMM GRANULOCYTES # BLD: 0 10E3/UL
IMM GRANULOCYTES NFR BLD: 0 %
LYMPHOCYTES # BLD AUTO: 2.2 10E3/UL (ref 0.8–5.3)
LYMPHOCYTES NFR BLD AUTO: 23 %
MCH RBC QN AUTO: 31.3 PG (ref 26.5–33)
MCHC RBC AUTO-ENTMCNC: 33.3 G/DL (ref 31.5–36.5)
MCV RBC AUTO: 94 FL (ref 78–100)
MONOCYTES # BLD AUTO: 1.2 10E3/UL (ref 0–1.3)
MONOCYTES NFR BLD AUTO: 12 %
NEUTROPHILS # BLD AUTO: 6.4 10E3/UL (ref 1.6–8.3)
NEUTROPHILS NFR BLD AUTO: 65 %
NRBC # BLD AUTO: 0 10E3/UL
NRBC BLD AUTO-RTO: 0 /100
PLATELET # BLD AUTO: 180 10E3/UL (ref 150–450)
RBC # BLD AUTO: 3.68 10E6/UL (ref 4.4–5.9)
WBC # BLD AUTO: 9.9 10E3/UL (ref 4–11)

## 2023-06-25 PROCEDURE — 36415 COLL VENOUS BLD VENIPUNCTURE: CPT | Performed by: HOSPITALIST

## 2023-06-25 PROCEDURE — 250N000012 HC RX MED GY IP 250 OP 636 PS 637: Performed by: HOSPITALIST

## 2023-06-25 PROCEDURE — 99232 SBSQ HOSP IP/OBS MODERATE 35: CPT | Performed by: HOSPITALIST

## 2023-06-25 PROCEDURE — 120N000001 HC R&B MED SURG/OB

## 2023-06-25 PROCEDURE — 250N000011 HC RX IP 250 OP 636: Mod: JZ | Performed by: HOSPITALIST

## 2023-06-25 PROCEDURE — 250N000013 HC RX MED GY IP 250 OP 250 PS 637: Performed by: HOSPITALIST

## 2023-06-25 PROCEDURE — 85025 COMPLETE CBC W/AUTO DIFF WBC: CPT | Performed by: HOSPITALIST

## 2023-06-25 RX ADMIN — CEFTRIAXONE SODIUM 1 G: 1 INJECTION, POWDER, FOR SOLUTION INTRAMUSCULAR; INTRAVENOUS at 04:29

## 2023-06-25 RX ADMIN — PREDNISONE 5 MG: 5 TABLET ORAL at 08:27

## 2023-06-25 RX ADMIN — ABIRATERONE 1000 MG: 250 TABLET ORAL at 08:27

## 2023-06-25 RX ADMIN — TAMSULOSIN HYDROCHLORIDE 0.4 MG: 0.4 CAPSULE ORAL at 10:13

## 2023-06-25 RX ADMIN — PREDNISONE 5 MG: 5 TABLET ORAL at 20:35

## 2023-06-25 ASSESSMENT — ACTIVITIES OF DAILY LIVING (ADL)
ADLS_ACUITY_SCORE: 25

## 2023-06-25 NOTE — PLAN OF CARE
Reason for Admission: Dizziness      Cognitive/Mentation: A/Ox 4  Neuros/CMS: Intact ex some confusion and word finding at times  VS: stable.   Tele: SR.  GI: BS active, passing flatus. Continent.  : May for retention.  Pulmonary: LS clear.  Pain: denies.   Skin: intact  Activity: Assist x 1 with GB.  Diet: regular with thin liquids. Takes pills whole.      Therapies recs: pending   Discharge: pending     Aggression Stoplight Tool: green

## 2023-06-25 NOTE — PROVIDER NOTIFICATION
MD Notification    Notified Person: MD    Notified Person Name: Dr. Redmond    Notification Date/Time: 6/25/23 at 0743    Notification Interaction: vocera    Purpose of Notification:  NeuroCVA signed off 6/23. Okay for q8h neuros/vitals?    Orders Received: okay for q8h checks    Comments:

## 2023-06-25 NOTE — PROGRESS NOTES
[facility]    Medicine Progress Note - Hospitalist Service    Date of Admission:  6/22/2023    Assessment & Plan   72 year old male  Past medical history of metastatic prostate cancer, benign prostatic hypertrophy, urinary retention, constipation.  Admitted with dizziness, aphasia, and acute encephalopathy initially concerning for stroke, later consistent with urinary tract infection (UTI).    Acute metabolic encephalopathy, resolving  Urinary tract infection (UTI)  Leukocytosis, resolving  Dizziness, expressive aphasia - resolving  Orthostatic hypotension  Metastatic prostate cancer  Benign prostatic hypertrophy (BPH)   History of urinary retention, May in place  Hyponatremia, mild  Weakness and deconditioning  Admitted with dizziness, aphasia, and acute encephalopathy initially concerning for stroke, later consistent with urinary tract infection (UTI).  Stroke neurology consulted and MRI head negative for stroke.  Toxic-metabolic encephalopathy felt most likely due to lower blood pressure, urinary tract infection (UTI), and unlikely neurovascular process, see note 6/23, signed off.  No further stroke workup recommended with ongoing treatment of urinary tract infection.  Urology consulted 6/23 for urinary retention and prostate cancer history.  Treatment of urinary tract infection, cultures, and continued May placement with outpatient follow-up.  Has follow-up surgery on 7/11 with Dr. Dey, see note 6/23.    Continue IV Rocephin, possible transition to oral antibiotics next 24  Hours    Urine culture 6/23, >100,000 mixed urogenital olena; indeterminate, impressive clinical presentation with encephalopathy, aphasia; continue IV antibiotics for now as above    Blood cultures reviewed, negative to date    Neuro checks; monollow-up urine and blood cultures, revieweditor mental status, overall improving, appears toxic-metabolic related to infection    Monitor blood pressure, encourage  oral fluid intake    Continue May catheter, as per urology consult follow-up, review at discharge    Continue Flomax, Zytiga, prednisone    Monitor serum sodium, 138 on 6/24 (normal)    Ongoing physical therapy (PT), occupational therapy (OT) for weakness and deconditioning, strength improving    Outpatient follow-up and management of prostate cancer as per urology, upcoming appointment 7/11 as above    AM labs reviews    Anemia, normocytic    Mild anemia, normocytic, in the setting of prostate cancer and recent infection    Monitor, follow-up with primary clinic provider   Recent Labs   Lab 06/25/23  0713 06/24/23  0808 06/23/23  0812 06/22/23 2042   HGB 11.5* 12.2* 13.0* 13.8     Disposition    Estimated length of stay 24 to 48 hours    Anticipated discharge to home    Social work consult    Change in hospitalist provider tomorrow with one of my [facility] hospitalist colleagues assuming care.       Diet: Combination Diet Regular Diet    DVT Prophylaxis: Pneumatic Compression Devices  May Catheter: PRESENT, indication: Retention  Lines: None     Cardiac Monitoring: None  Code Status: Full Code      Clinically Significant Risk Factors                                  Disposition Plan      Expected Discharge Date: 06/26/2023,  3:00 PM    Destination: home            Alexandre Redmond MD  Hospitalist Service  [facility]  Securely message with [employer] (more info)  Text page via [employer] Paging/Directory   ______________________________________________________________________    Interval History   First visit with patient yesterday, feeling better overall.  No report of pain.  May remains in place.  Continues on IV antibiotics.    Physical Exam   Vital Signs: Temp: 97.9  F (36.6  C) Temp src: Oral BP: 113/59 Pulse: 74   Resp: 16 SpO2: 96 % O2 Device: None (Room air)    Weight: 145 lbs 15.11 oz    GENERAL awake and alert, lying in bed, no acute distress   LUNGS moderate inspiratory  effort, no wheezes or crackles  HEART S1, S2 regular rate and rhythm; no rubs or gallops  ABDOMEN soft, nontender to palpation; no guarding, rebound, or rigidity  MUSCULOSKELETAL extremities without edema  SKIN warm and dry  NEURO moves upper and lower extremities spontaneously and to command  MENTAL STATUS answering questions and following simple commands, stable    Medical Decision Making             Data   Recent Labs   Lab 06/25/23  0713 06/24/23  0808 06/23/23  0812 06/22/23  2042   HGB 11.5* 12.2* 13.0* 13.8     Recent Labs   Lab Test 06/24/23  1728 06/24/23  1126 06/24/23  0808 06/23/23  1219 06/23/23  0812   NA  --   --  138  --  136   POTASSIUM  --   --  3.7  --  3.4   CHLORIDE  --   --  108*  --  101   CO2  --   --  20*  --  22   ANIONGAP  --   --  10  --  13   * 98 106*   < > 106*   BUN  --   --  11.8  --  17.1   CR  --   --  0.74  --  0.89   MIKY  --   --  8.6*  --  8.7*    < > = values in this interval not displayed.

## 2023-06-25 NOTE — CONSULTS
Care Management Initial Consult    General Information  Assessment completed with: Patient,    Type of CM/SW Visit: Initial Assessment    Primary Care Provider verified and updated as needed:     Readmission within the last 30 days:        Reason for Consult: discharge planning  Advance Care Planning:            Communication Assessment  Patient's communication style: spoken language (English or Bilingual)    Hearing Difficulty or Deaf: no   Wear Glasses or Blind: yes    Cognitive  Cognitive/Neuro/Behavioral: .WDL except  Level of Consciousness: alert  Arousal Level: opens eyes spontaneously  Orientation: oriented x 4  Mood/Behavior: calm, cooperative  Best Language: 0 - No aphasia  Speech: word-finding difficulty    Living Environment:   People in home: alone     Current living Arrangements: apartment      Able to return to prior arrangements: yes       Family/Social Support:  Care provided by: self  Provides care for:    Marital Status: Single  Neighbor          Description of Support System: Supportive    Support Assessment: Adequate social supports    Current Resources:   Patient receiving home care services:       Community Resources:    Equipment currently used at home: none  Supplies currently used at home:      Employment/Financial:  Employment Status:          Financial Concerns:             Does the patient's insurance plan have a 3 day qualifying hospital stay waiver?  No    Lifestyle & Psychosocial Needs:  Social Determinants of Health     Tobacco Use: Not on file   Alcohol Use: Not on file   Financial Resource Strain: Not on file   Food Insecurity: Not on file   Transportation Needs: Not on file   Physical Activity: Not on file   Stress: Not on file   Social Connections: Not on file   Intimate Partner Violence: Not on file   Depression: Not on file   Housing Stability: Not on file       Functional Status:  Prior to admission patient needed assistance:              Mental Health Status:          Chemical  Dependency Status:                Values/Beliefs:  Spiritual, Cultural Beliefs, Baptism Practices, Values that affect care:                 Additional Information:  CM/SW consulted for discharge planning/disposition. SW completed chart review. Patient is a 72 year old male with a recent medical history of prostate cancer status post TURP on April 23, status post right nephrostomy tube placement with IR which has since been removed, urinary retention with a May catheter which was removed on Tuesday, June 20 presents to hospital with dizziness and word finding difficulty.   Patient is mostly back to baseline functioning and is close to discharge. Patient planning on having support from neighbors in apartment complex who will check in on patient regularly. No other needs indicated at this time.     SW/CM will continue to follow for further care coordination.     CAMILA Pena

## 2023-06-25 NOTE — PROVIDER NOTIFICATION
Notified provider, Dr. Redmond, about indwelling laughlin catheter discussed removal or continued need at 0743 via [employer].    Did provider choose to remove indwelling laughlin catheter? Order to follow-up with urology regarding plan    Provider's laughlin indication for keeping indwelling laughlin catheter: Retention.    Is there an order for indwelling alughlin catheter? Yes    *If there is a plan to keep laughlin catheter in place at discharge daily notification with provider is not necessary, but please add a notation in the treatment team sticky note that the patient will be discharging with the catheter.

## 2023-06-25 NOTE — PROVIDER NOTIFICATION
"MD Notification    Notified Person: MD    Notified Person Name:  Ruy    Notification Date/Time: 6/25/23 at 1615    Notification Interaction: santino    Purpose of Notification: \"Patient requesting to resume his vitamins that his sister brought from home. Meds are: C-1000, 100mg capsules; 1 capsule BID, Intestinal formula, 985mg, 1 capsule daily, and high potency Vitamin D3 1000IU, 1 soft gel daily\"    Orders Received: okay to resume    Comments: After talking with pt, pt only wants to take intestinal formula while in hospital. Order placed for this med. Sister, Kelin, taking home other 2 medications.    "

## 2023-06-25 NOTE — PROVIDER NOTIFICATION
MD Notification    Notified Person: MD    Notified Person Name: Dr. Lane via [facility]    Notification Date/Time: 6/25/23 at 1200    Notification Interaction: answering service    Purpose of Notification: To see if current May will be kept in at time of discharge    Orders Received: Pt will keep May in at discharge.     Comments: Hospitalist notified

## 2023-06-26 ENCOUNTER — APPOINTMENT (OUTPATIENT)
Dept: OCCUPATIONAL THERAPY | Facility: CLINIC | Age: 73
DRG: 689 | End: 2023-06-26
Payer: MEDICARE

## 2023-06-26 VITALS
RESPIRATION RATE: 16 BRPM | SYSTOLIC BLOOD PRESSURE: 118 MMHG | DIASTOLIC BLOOD PRESSURE: 60 MMHG | BODY MASS INDEX: 20.89 KG/M2 | HEART RATE: 63 BPM | TEMPERATURE: 97.6 F | WEIGHT: 145.94 LBS | OXYGEN SATURATION: 97 % | HEIGHT: 70 IN

## 2023-06-26 PROCEDURE — 250N000012 HC RX MED GY IP 250 OP 636 PS 637: Performed by: HOSPITALIST

## 2023-06-26 PROCEDURE — 99239 HOSP IP/OBS DSCHRG MGMT >30: CPT | Performed by: INTERNAL MEDICINE

## 2023-06-26 PROCEDURE — 250N000013 HC RX MED GY IP 250 OP 250 PS 637: Performed by: HOSPITALIST

## 2023-06-26 PROCEDURE — 97535 SELF CARE MNGMENT TRAINING: CPT | Mod: GO

## 2023-06-26 PROCEDURE — 250N000011 HC RX IP 250 OP 636: Mod: JZ | Performed by: HOSPITALIST

## 2023-06-26 RX ORDER — CEFDINIR 300 MG/1
300 CAPSULE ORAL 2 TIMES DAILY
Qty: 6 CAPSULE | Refills: 0 | Status: SHIPPED | OUTPATIENT
Start: 2023-06-27 | End: 2023-06-30

## 2023-06-26 RX ADMIN — PREDNISONE 5 MG: 5 TABLET ORAL at 07:51

## 2023-06-26 RX ADMIN — CEFTRIAXONE SODIUM 1 G: 1 INJECTION, POWDER, FOR SOLUTION INTRAMUSCULAR; INTRAVENOUS at 05:48

## 2023-06-26 RX ADMIN — ABIRATERONE 1000 MG: 250 TABLET ORAL at 07:48

## 2023-06-26 RX ADMIN — TAMSULOSIN HYDROCHLORIDE 0.4 MG: 0.4 CAPSULE ORAL at 07:51

## 2023-06-26 ASSESSMENT — ACTIVITIES OF DAILY LIVING (ADL)
ADLS_ACUITY_SCORE: 25

## 2023-06-26 NOTE — DISCHARGE INSTRUCTIONS
Dear Don Venegas,    These medication changes were made: Added 3 days of Cefdinir 300mg twice daily for continued antibiotic coverage for urinary tract infection.    Please take all medications as prescribed.     Follow up appointments:  Please make an appointment with your primary care physician within 7 days of discharge, for hospital follow up.     Return to emergency room if you are feeling worse.    [facility] Medical Team       Catheter Instructions:  - You have a May catheter in place to allow for healing of your urinary system.   - You will need to see your urology doctor to have the drain checked and subsequently removed at your outpatient follow up appointment.   - Do not soak or take a tub bath (no swimming or going in a hot tub) while your catheter remains in place. You may shower with the catheter in. You may use a wet or soapy wash cloth to clean the outside of the catheter and the groin area, just make sure to avoid pulling on the catheter. Always secure the catheter to your thigh to prevent pulling.   - Look at the appearance and amount of drainage that comes from the drain or pouch. Call [facility] office if the drain falls out, the drainage has bright red blood that does not clear within 48 hours, the drainage has a foul smell, the drainage has pus in it or you have concerns about the drainage or any other concerns.   - For male patients: You may apply a small amount of bacitracin/neosporin ointment to the tip of your penis twice daily while your catheter is in place to help reduce irritation.

## 2023-06-26 NOTE — PLAN OF CARE
A&O x4.  Neuros intact. VSS on RA. Up w/ SBA. Denied pain. May remains in place. Tolerating regular diet. Takes pills whole. AVS and Rx reviewed with patient and family. May education complete and sent home with leg bag. Discharge to home.

## 2023-06-26 NOTE — PROGRESS NOTES
Care Management Discharge Note    Discharge Date: 06/26/2023       Discharge Disposition:      Discharge Services:      Discharge DME:      Discharge Transportation: family or friend will provide, car, drives self    Private pay costs discussed: Not applicable    Does the patient's insurance plan have a 3 day qualifying hospital stay waiver?  No    PAS Confirmation Code:    Patient/family educated on Medicare website which has current facility and service quality ratings:      Education Provided on the Discharge Plan:    Persons Notified of Discharge Plans:   Patient/Family in Agreement with the Plan:      Handoff Referral Completed: No    Additional Information:  Patient will discharge home today.  No CM interventions anticipated.  He has an appointment previously scheduled.    Jun 28, 2023 11:00 AM   (Arrive by 10:40 AM)   Pre-Operative Physical with Saurav Ashby PA-C   [facility]    Georgina Cruz RN, BSN, PHN  Inpatient Care Coordination  [facility]  Phone: [phone]

## 2023-06-26 NOTE — PLAN OF CARE
Reason for Admission: Dizziness      Cognitive/Mentation: A/Ox 4  Neuros/CMS: Intact ex some confusion and word finding at times  VS: stable.   Tele: SR.  GI: BS active, passing flatus. Continent.  : [place] for retention. Will discharge with laughlin  Pulmonary: LS clear.  Pain: denies.   Skin: intact  Activity: Assist x 1 with GB.  Diet: regular with thin liquids. Takes pills whole.      Therapies recs: pending   Discharge: pending     Aggression Stoplight Tool: green

## 2023-06-26 NOTE — PLAN OF CARE
Pt here with UTI. A&Ox4. Slight WFD. Other neuros intact. VSS. Up with SBA with GB. Minimal dizziness noted today, only reported x2 when working on computer. Pt states this lasted less than 10 sec/each. Ortho BPs negative. Denies pain. May patent, in for retention. Plan to discharge with May per urology. Discharge to home, likely tomorrow.

## 2023-06-26 NOTE — DISCHARGE SUMMARY
"[facility]    Discharge Summary  Hospitalist    Date of Admission:  6/22/2023  Date of Discharge:  6/26/2023    Discharge Diagnoses      Expressive aphasia  Dizziness  Acute cystitis without hematuria    History of Present Illness   \"Don Venegas is a 72 year old male with a recent medical history of prostate cancer status post TURP on April 23, status post right nephrostomy tube placement with IR which has since been removed, urinary retention with a May catheter which was removed on Tuesday, June 20 presents to hospital with dizziness and word finding difficulty.  He reports that he was in his usual state of health until Thursday afternoon when he started experiencing generalized fatigue, dizziness described as a sensation of lightheadedness, word finding difficulty.  He decided take a nap around 3 PM and woke up around 6 PM with ongoing symptoms so he presented to the emergency room.  In the emergency room a tier 2 code stroke was called and work-up so far has been revealing.  During my interview the patient continues to have word finding difficulties.  Patient reports that he started treatment for his prostate cancer with Eligard on Tuesday.  Since his May catheter removal on Tuesday he reports he has been experiencing mild dysuria and reports that his urine appears darker than normal.  He provides no other complaints.    Hospital Course   Acute metabolic encephalopathy, resolving  Urinary tract infection (UTI)  Leukocytosis, resolving  Dizziness, expressive aphasia - resolving  Orthostatic hypotension  Metastatic prostate cancer  Benign prostatic hypertrophy (BPH)   History of urinary retention, May in place  Hyponatremia, mild  Weakness and deconditioning  Admitted with dizziness, aphasia, and acute encephalopathy initially concerning for stroke, later consistent with urinary tract infection (UTI).  Stroke neurology consulted and MRI head negative for stroke.  Toxic-metabolic " encephalopathy felt most likely due to lower blood pressure, urinary tract infection (UTI), and unlikely neurovascular process, see note 6/23, signed off.  No further stroke workup recommended with ongoing treatment of urinary tract infection.  Urology consulted 6/23 for urinary retention and prostate cancer history.  Treatment of urinary tract infection, cultures, and continued May placement with outpatient follow-up.  Has follow-up surgery on 7/11 with Dr. Dey, see note 6/23.    Continue IV Rocephin (6/23-6/26), will discharge on additional 3 days of 300mg PO Cefdinir    Urine culture 6/23, >100,000 mixed urogenital olena; indeterminate, impressive clinical presentation with encephalopathy, aphasia; continue IV antibiotics for now as above    Blood cultures reviewed, negative to date    Neuro checks; monollow-up urine and blood cultures, revieweditor mental status, overall improving, appears toxic-metabolic related to infection    Monitor blood pressure, encourage oral fluid intake    Continue May catheter, as per urology consult follow-up, review at discharge    Continue Flomax, Zytiga, prednisone    Monitor serum sodium, 138 on 6/24 (normal)    Ongoing physical therapy (PT), occupational therapy (OT) for weakness and deconditioning, strength improving    Outpatient follow-up and management of prostate cancer as per urology, upcoming appointment 7/11 as above    AM labs reviews     Anemia, normocytic    Mild anemia, normocytic, in the setting of prostate cancer and recent infection  Monitor, follow-up with primary clinic provider      Disposition    Estimated length of stay 24 to 48 hours    Anticipated discharge to home    Social work consult      Norma Majano MD    Significant Results and Procedures   na  Pending Results   These results will be followed up by PCP  Unresulted Labs Ordered in the Past 30 Days of this Admission     Date and Time Order Name Status Description    6/23/2023  2:06  PM Blood Culture Hand, Right Preliminary     6/23/2023  2:06 PM Blood Culture Arm, Left Preliminary         Code Status   Full Code       Primary Care Physician   Physician No Ref-Primary    Physical Exam   Temp: 97.6  F (36.4  C) Temp src: Oral BP: 118/60 Pulse: 63   Resp: 16 SpO2: 97 % O2 Device: None (Room air)    Vitals:    06/23/23 0228 06/25/23 0829   Weight: 62.5 kg (137 lb 12.6 oz) 66.2 kg (145 lb 15.1 oz)     Vital Signs with Ranges  Temp:  [97.6  F (36.4  C)-98.4  F (36.9  C)] 97.6  F (36.4  C)  Pulse:  [62-71] 63  Resp:  [16] 16  BP: (107-118)/(55-66) 118/60  SpO2:  [96 %-97 %] 97 %  I/O last 3 completed shifts:  In: 240 [P.O.:240]  Out: 2975 [Urine:2975]    Constitutional: Awake, alert, cooperative, no apparent distress  Respiratory: Clear to auscultation bilaterally, no crackles or wheezing  Cardiovascular: Regular rate and rhythm, normal S1 and S2, and no murmur noted  GI: Normal bowel sounds, soft, non-distended, non-tender  Skin/Integumen: No rashes, no cyanosis, no edema  Other:     Discharge Disposition   Discharged to home  Condition at discharge: Stable    Consultations This Hospital Stay   NEUROLOGY IP STROKE CONSULT  CARE MANAGEMENT / SOCIAL WORK IP CONSULT  PATIENT LEARNING CENTER IP CONSULT  SPEECH LANGUAGE PATH ADULT IP CONSULT  PHARMACY IP CONSULT  PHARMACY IP CONSULT  PHARMACY IP CONSULT  PHYSICAL THERAPY ADULT IP CONSULT  OCCUPATIONAL THERAPY ADULT IP CONSULT  REHAB ADMISSIONS LIAISON IP CONSULT  UROLOGY IP CONSULT  SMOKING CESSATION PROGRAM IP CONSULT    Time Spent on this Encounter   I, Norma Majano MD, personally saw the patient today and spent greater than 30 minutes discharging this patient.    Discharge Orders      Reason for your hospital stay    UTI     Follow-up and recommended labs and tests     Follow up with primary care provider, Physician No Ref-Primary, within 7 days for hospital follow- up.  No follow up labs or test are needed.     Activity    Your activity upon  discharge: activity as tolerated     Diet    Follow this diet upon discharge: Orders Placed This Encounter      Combination Diet Regular Diet     Discharge Medications   Discharge Medication List as of 6/26/2023 12:33 PM      START taking these medications    Details   cefdinir (OMNICEF) 300 MG capsule Take 1 capsule (300 mg) by mouth 2 times daily for 3 days, Disp-6 capsule, R-0, E-Prescribe         CONTINUE these medications which have NOT CHANGED    Details   abiraterone (ZYTIGA) 250 MG tablet Take 1,000 mg by mouth daily, Historical      Multiple Minerals-Vitamins (PROSTEON) TABS Take 1 capsule by mouth daily, Historical      NONFORMULARY Patient takes a mens herbal supplement daily which includes saw palmetto., Historical      predniSONE (DELTASONE) 5 MG tablet Take 5 mg by mouth 2 times daily, Historical      tamsulosin (FLOMAX) 0.4 MG capsule Take 1 capsule (0.4 mg) by mouth daily Future refills to Urology, Dr. Dey, Disp-30 capsule, R-0, E-Prescribe           Allergies   Allergies   Allergen Reactions     Gluten Meal      Data   Most Recent 3 CBC's:  Recent Labs   Lab Test 06/25/23  0713 06/24/23  0808 06/23/23  0812   WBC 9.9 15.1* 16.5*   HGB 11.5* 12.2* 13.0*   MCV 94 92 92    202 251      Most Recent 3 BMP's:  Recent Labs   Lab Test 06/24/23  1728 06/24/23  1126 06/24/23  0808 06/23/23  1219 06/23/23  0812 06/23/23  0728 06/22/23  2042   NA  --   --  138  --  136  --  133*   POTASSIUM  --   --  3.7  --  3.4  --  3.8   CHLORIDE  --   --  108*  --  101  --  96*   CO2  --   --  20*  --  22  --  23   BUN  --   --  11.8  --  17.1  --  21.9   CR  --   --  0.74  --  0.89  --  0.97   ANIONGAP  --   --  10  --  13  --  14   MIKY  --   --  8.6*  --  8.7*  --  9.2   * 98 106*   < > 106*   < > 109*    < > = values in this interval not displayed.     Most Recent 2 LFT's:  Recent Labs   Lab Test 06/22/23 2042 04/23/23  0653   AST 18 28   ALT 18 14   ALKPHOS 98 90   BILITOTAL 0.6 1.0     Most Recent  INR's and Anticoagulation Dosing History:  Anticoagulation Dose History        Latest Ref Rng & Units 4/23/2023 6/22/2023   Recent Dosing and Labs   INR 0.85 - 1.15 1.18  1.03      Most Recent 3 Troponin's:No lab results found.    Invalid input(s): TROP, TROPONINIES  Most Recent Cholesterol Panel:  Recent Labs   Lab Test 06/22/23 2042   CHOL 159   LDL 87   HDL 54   TRIG 91     Most Recent 6 Bacteria Isolates From Any Culture (See EPIC Reports for Culture Details):No lab results found.  Most Recent TSH, T4 and A1c Labs:  Recent Labs   Lab Test 06/22/23 2042   A1C 5.3       Results for orders placed or performed during the hospital encounter of 06/22/23   CT Head w/o Contrast    Narrative    EXAM: CT HEAD W/O CONTRAST  LOCATION: [facility]  DATE: 6/22/2023    INDICATION: Slurred speech  COMPARISON: None.  TECHNIQUE: Routine CT Head without IV contrast. Multiplanar reformats. Dose reduction techniques were used.    FINDINGS:  INTRACRANIAL CONTENTS: No intracranial hemorrhage, extraaxial collection, or mass effect.  No CT evidence of acute infarct. Mild presumed chronic small vessel ischemic changes. Mild generalized volume loss. No hydrocephalus.     VISUALIZED ORBITS/SINUSES/MASTOIDS: No intraorbital abnormality. No paranasal sinus mucosal disease. No middle ear or mastoid effusion.    BONES/SOFT TISSUES: No acute abnormality.      Impression    IMPRESSION:  1.  No acute intracranial process.    Results were called to MILAGROS BLAIR at 6/22/2023 10:01 PM CDT.     CTA Head Neck with Contrast    Narrative    EXAM: CTA HEAD NECK W CONTRAST  LOCATION: [facility]  DATE: 6/22/2023    INDICATION: Slurred speech  COMPARISON: None.  CONTRAST: 75 mL Isovue 370  TECHNIQUE: Head and neck CT angiogram with IV contrast. Axial helical CT images of the head and neck vessels obtained during the arterial phase of intravenous contrast administration. Axial 2D reconstructed images  and multiplanar 3D MIP reconstructed   images of the head and neck vessels were performed by the technologist. Dose reduction techniques were used. All stenosis measurements made according to NASCET criteria unless otherwise specified.    FINDINGS:   HEAD CTA:  ANTERIOR CIRCULATION: No stenosis/occlusion, aneurysm, or high flow vascular malformation. Fetal origin right posterior cerebral artery.    POSTERIOR CIRCULATION: No stenosis/occlusion, aneurysm, or high flow vascular malformation. Dominant right and smaller left vertebral artery contribute to a normal basilar artery.     DURAL VENOUS SINUSES: Expected enhancement of the major dural venous sinuses.    NECK CTA:  RIGHT CAROTID: No measurable stenosis or dissection.    LEFT CAROTID: No measurable stenosis or dissection.    VERTEBRAL ARTERIES: No focal stenosis or dissection. Dominant right and smaller left vertebral arteries.    AORTIC ARCH: Classic aortic arch anatomy with no significant stenosis at the origin of the great vessels.    NONVASCULAR STRUCTURES: Several scattered sclerotic lesions throughout visualized vertebrae, the largest in C4; suggestive of sclerotic metastases. Opacities right lung apex, presumably fibrotic changes however indeterminate.      Impression    IMPRESSION:   HEAD CTA:  Normal CTA [place].    NECK CTA:  1.  Normal neck CTA.  2.  Several scattered sclerotic lesions throughout visualized vertebrae, the largest in C4; suggestive of sclerotic metastases.    Results were called to MILAGROS BLAIR at 6/22/2023 10:01 PM CDT.     CT Head Perfusion w Contrast - For Tier 2 Stroke    Narrative    EXAM: CT HEAD PERFUSION W CONTRAST  LOCATION: [facility]  DATE: 6/22/2023    INDICATION: Slurred speech  COMPARISON: None.  TECHNIQUE: CT cerebral perfusion was performed utilizing a second contrast bolus. Perfusion data were post processed with generation of standard perfusion maps and estimation of  ischemic/infarcted volumes utilizing standard threshold values. Dose   reduction techniques were used. All stenosis measurements made according to NASCET criteria unless otherwise specified.  CONTRAST: 50 mL Isovue 370    FINDINGS:   PERFUSION MAPS: Suggestion of mild area of decreased cerebral blood flow and to a lesser degree decreased cerebral blood volume in the region of right middle cerebellar peduncle. This could potentially represent small area of oligemia.    RAPID ANALYSIS:  CBF<30%: 0 mL  Tmax>6sec: 3 mL  Mismatch volume: 3 mL  Mismatch ratio: Infinite      Impression    IMPRESSION:   CT PERFUSION:  1.  Suggestion of mild area of oligemia right middle cerebellar peduncle.  2.  No definite core infarct identified; suggestive of entire area representing tissue at risk.  3.  MRI head recommended.    Results were called to MILAGROS BLAIR at 6/22/2023 10:01 PM CDT.   MR Brain w/o & w Contrast    Narrative    EXAM: MR BRAIN W/O and W CONTRAST  LOCATION: [facility]  DATE: 6/23/2023    INDICATION: dizzy, word finding difficulty, hx of prostate cancer with mets  COMPARISON: None.  CONTRAST: 7ml Gadavist  TECHNIQUE: Routine multiplanar multisequence head MRI without and with intravenous contrast.    FINDINGS:  INTRACRANIAL CONTENTS: No acute or subacute infarct. No mass, acute hemorrhage, or extra-axial fluid collections. Normal brain parenchymal signal. Mild generalized cerebral atrophy. No hydrocephalus. Normal position of the cerebellar tonsils. No   pathologic contrast enhancement.    SELLA: No abnormality accounting for technique.    OSSEOUS STRUCTURES/SOFT TISSUES: Low T1 signal lesion within the right aspect of C4, indeterminate; compatible with patient's known history of metastatic prostate carcinoma. The major intracranial vascular flow voids are maintained.     ORBITS: No abnormality accounting for technique.     SINUSES/MASTOIDS: No paranasal sinus mucosal disease. No middle  ear or mastoid effusion.       Impression    IMPRESSION:  1.  No acute infarct, mass, mass effect, or hemorrhage.  2.  Mild atrophy.  3.  Low T1 signal lesion within the right aspect of C4, indeterminate; compatible with patient's known history of metastatic prostate carcinoma.     Chest XR,  PA & LAT    Narrative    EXAM: XR CHEST 2 VIEWS  LOCATION: [facility]  DATE: 6/23/2023    INDICATION: Prostate cancer, elevated white blood cell count  COMPARISON: None.      Impression    IMPRESSION: Negative chest.

## 2023-06-27 NOTE — PROGRESS NOTES
[address], [address]  [facility] [address]  Phone: [phone]  Primary Provider: No Ref-Primary, Physician  Pre-op Performing Provider: CAROLEE GUZMAN      PREOPERATIVE EVALUATION:  Today's date: 6/28/2023    Don Venegas is a 72 year old male who presents for a preoperative evaluation.    Surgical Information:  Surgery/Procedure: Cystoscopy, transurethral resection (TUR) prostate, combined  RIGHT URETERAL STENT EXCHANGE  Surgery Location:  OR   Surgeon: Kasi Dey MD  Surgery Date: 7/11/2023  Time of Surgery: 9:10 am   Where patient plans to recover: At home with family  Fax number for surgical facility: Note does not need to be faxed, will be available electronically in Epic.    Assessment & Plan     The proposed surgical procedure is considered INTERMEDIATE risk.    Pre-op exam  Prostate cancer (H)  Check labs including CBC and BMP today.  Cleared for surgery pending these.  Hold NSAIDs/vitamins 1 week prior to surgery.  Continue all other regularly scheduled medications.  Comfortable with this plan.  No further questions today.    - CBC with platelets and differential  - Basic metabolic panel  (Ca, Cl, CO2, Creat, Gluc, K, Na, BUN)      MED REC REQUIRED  Post Medication Reconciliation Status: discharge medications reconciled and changed, per note/orders        - No identified additional risk factors other than previously addressed      RECOMMENDATION:  APPROVAL GIVEN to proceed with proposed procedure pending review of diagnostic evaluation.      30 minutes spent by me on the date of the encounter doing chart review, review of test results, interpretation of tests, patient visit and documentation       Subjective       HPI related to upcoming procedure:     Here for preoperative clearance for upcoming cystoscopy/TURP.  This is scheduled for 7/11/2023.  He has past medical history significant for prostate cancer.  Has ongoing follow-up with  urology.  Currently with a catheter in place.  Has had no issues in the past with anesthesia.  Denies history of heart attack, stroke, or blood clot.  Denies chest pain, shortness of breath, palpitations, or new leg swelling.        6/27/2023     2:26 PM   Preop Questions   1. Have you ever had a heart attack or stroke? No   2. Have you ever had surgery on your heart or blood vessels, such as a stent placement, a coronary artery bypass, or surgery on an artery in your head, neck, heart, or legs? No   3. Do you have chest pain with activity? No   4. Do you have a history of  heart failure? No   5. Do you currently have a cold, bronchitis or symptoms of other infection? No   6. Do you have a cough, shortness of breath, or wheezing? No   7. Do you or anyone in your family have previous history of blood clots? No   8. Do you or does anyone in your family have a serious bleeding problem such as prolonged bleeding following surgeries or cuts? No   9. Have you ever had problems with anemia or been told to take iron pills? YES - 1986 following iritis episode.   10. Have you had any abnormal blood loss such as black, tarry or bloody stools? No   11. Have you ever had a blood transfusion? No   12. Are you willing to have a blood transfusion if it is medically needed before, during, or after your surgery? Yes   13. Have you or any of your relatives ever had problems with anesthesia? UNKNOWN   14. Do you have sleep apnea, excessive snoring or daytime drowsiness? No   15. Do you have any artifical heart valves or other implanted medical devices like a pacemaker, defibrillator, or continuous glucose monitor? No   16. Do you have artificial joints? No   17. Are you allergic to latex? No       Health Care Directive:  Patient does not have a Health Care Directive or Living Will:    Preoperative Review of :   reviewed - no record of controlled substances prescribed.        Review of Systems  CONSTITUTIONAL: NEGATIVE for fever,  chills, change in weight  INTEGUMENTARY/SKIN: NEGATIVE for worrisome rashes, moles or lesions  EYES: NEGATIVE for vision changes or irritation  ENT/MOUTH: NEGATIVE for ear, mouth and throat problems  RESP: NEGATIVE for significant cough or SOB  CV: NEGATIVE for chest pain, palpitations or peripheral edema  GI: NEGATIVE for nausea, abdominal pain, heartburn, or change in bowel habits  : NEGATIVE for frequency, dysuria, or hematuria  MUSCULOSKELETAL: NEGATIVE for significant arthralgias or myalgia  NEURO: NEGATIVE for weakness, dizziness or paresthesias  ENDOCRINE: NEGATIVE for temperature intolerance, skin/hair changes  HEME: NEGATIVE for bleeding problems  PSYCHIATRIC: NEGATIVE for changes in mood or affect    Patient Active Problem List    Diagnosis Date Noted     UTI (urinary tract infection) 06/24/2023     Priority: Medium     Dizziness 06/22/2023     Priority: Medium     Expressive aphasia 06/22/2023     Priority: Medium     Prostate cancer (H) 04/27/2023     Priority: Medium     Urinary retention 04/22/2023     Priority: Medium     Pyelonephritis, acute 04/22/2023     Priority: Medium     SHANON (acute kidney injury) (H) 04/22/2023     Priority: Medium      Past Medical History:   Diagnosis Date     Prostate cancer (H)      Past Surgical History:   Procedure Laterality Date     BIOPSY  4.22.2023     CYSTOSCOPY, TRANSURETHRAL RESECTION (TUR) PROSTATE, COMBINED N/A 04/23/2023    Procedure: CYSTOSCOPY, LIMITED TRANSURETHRAL RESECTION OF PROSTATE;  Surgeon: Matthias Cuellar MD;  Location: [facility] OR     IR NEPHROSTOMY TUBE PLACEMENT RIGHT  04/24/2023     IR URETERAL STENT PLACEMENT RIGHT  04/26/2023     Current Outpatient Medications   Medication Sig Dispense Refill     abiraterone (ZYTIGA) 250 MG tablet Take 1,000 mg by mouth daily       cefdinir (OMNICEF) 300 MG capsule Take 1 capsule (300 mg) by mouth 2 times daily for 3 days 6 capsule 0     Multiple Minerals-Vitamins (PROSTEON) TABS Take 1 capsule by mouth daily  "      NONFORMULARY Patient takes a mens herbal supplement daily which includes saw palmetto.       predniSONE (DELTASONE) 5 MG tablet Take 5 mg by mouth 2 times daily       tamsulosin (FLOMAX) 0.4 MG capsule Take 1 capsule (0.4 mg) by mouth daily Future refills to Urology, Dr. Dey 30 capsule 0       Allergies   Allergen Reactions     Gluten Meal         Social History     Tobacco Use     Smoking status: Never     Smokeless tobacco: Never   Substance Use Topics     Alcohol use: Not Currently     Family History   Problem Relation Age of Onset     Diabetes Mother      Cerebrovascular Disease Mother      Breast Cancer Paternal Grandmother      Other Cancer Other         maternal aunt     Thyroid Disease Sister      History   Drug Use Unknown         Objective     /72 (BP Location: Right arm, Patient Position: Sitting, Cuff Size: Adult Regular)   Pulse 74   Temp 97.3  F (36.3  C) (Temporal)   Resp 16   Ht 1.778 m (5' 10\")   Wt 63.5 kg (139 lb 14.4 oz)   SpO2 99%   BMI 20.07 kg/m      Physical Exam    GENERAL APPEARANCE: healthy, alert and no distress     EYES: EOMI,  PERRL     HENT: ear canals and TM's normal and nose and mouth without ulcers or lesions     NECK: no adenopathy, no asymmetry, masses, or scars and thyroid normal to palpation     RESP: lungs clear to auscultation - no rales, rhonchi or wheezes     CV: regular rates and rhythm, normal S1 S2, no S3 or S4 and no murmur, click or rub     ABDOMEN:  soft, nontender, no HSM or masses and bowel sounds normal     MS: extremities normal- no gross deformities noted, no evidence of inflammation in joints, FROM in all extremities.     SKIN: no suspicious lesions or rashes     NEURO: Normal strength and tone, sensory exam grossly normal, mentation intact and speech normal     PSYCH: mentation appears normal. and affect normal/bright     LYMPHATICS: No cervical adenopathy    Recent Labs   Lab Test 06/25/23  0713 06/24/23  0808 06/23/23  0812 " 06/22/23 2042 04/25/23  0758 04/23/23  2254   HGB 11.5* 12.2* 13.0* 13.8   < >  --     202 251 318   < >  --    INR  --   --   --  1.03  --  1.18*   NA  --  138 136 133*   < >  --    POTASSIUM  --  3.7 3.4 3.8   < >  --    CR  --  0.74 0.89 0.97   < >  --    A1C  --   --   --  5.3  --   --     < > = values in this interval not displayed.        Diagnostics:  Labs pending at this time.  Results will be reviewed when available.   No EKG required, no history of coronary heart disease, significant arrhythmia, peripheral arterial disease or other structural heart disease.    Revised Cardiac Risk Index (RCRI):  The patient has the following serious cardiovascular risks for perioperative complications:   - No serious cardiac risks = 0 points     RCRI Interpretation: 0 points: Class I (very low risk - 0.4% complication rate)    The likelihood of other entities in the differential is insufficient to justify any further testing for them at this time. This was explained to the patient. The patient was advised that persistent or worsening symptoms would require further evaluation. Patient advised to call the office and if unable to reach to go to the emergency room if they develop any new or worsening symptoms. Expressed understanding and agreement with above stated plan.       Signed Electronically by: Saurav Ashby PA-C  Copy of this evaluation report is provided to requesting physician.

## 2023-06-27 NOTE — PLAN OF CARE
Occupational Therapy Discharge Summary    Reason for therapy discharge:    Discharged to home.    Progress towards therapy goal(s). See goals on Care Plan in [facility] electronic health record for goal details.  Goals partially met.  Barriers to achieving goals:   discharge from facility.    Therapy recommendation(s):    No further therapy is recommended.

## 2023-06-28 ENCOUNTER — OFFICE VISIT (OUTPATIENT)
Dept: FAMILY MEDICINE | Facility: CLINIC | Age: 73
End: 2023-06-28
Payer: MEDICARE

## 2023-06-28 ENCOUNTER — PATIENT OUTREACH (OUTPATIENT)
Dept: CARE COORDINATION | Facility: CLINIC | Age: 73
End: 2023-06-28

## 2023-06-28 VITALS
SYSTOLIC BLOOD PRESSURE: 131 MMHG | HEIGHT: 70 IN | DIASTOLIC BLOOD PRESSURE: 72 MMHG | WEIGHT: 139.9 LBS | BODY MASS INDEX: 20.03 KG/M2 | HEART RATE: 74 BPM | TEMPERATURE: 97.3 F | OXYGEN SATURATION: 99 % | RESPIRATION RATE: 16 BRPM

## 2023-06-28 DIAGNOSIS — Z01.818 PRE-OP EXAM: Primary | ICD-10-CM

## 2023-06-28 DIAGNOSIS — C61 PROSTATE CANCER (H): ICD-10-CM

## 2023-06-28 LAB
ANION GAP SERPL CALCULATED.3IONS-SCNC: 13 MMOL/L (ref 7–15)
BACTERIA BLD CULT: NO GROWTH
BACTERIA BLD CULT: NO GROWTH
BASOPHILS # BLD AUTO: 0 10E3/UL (ref 0–0.2)
BASOPHILS NFR BLD AUTO: 0 %
BUN SERPL-MCNC: 19 MG/DL (ref 8–23)
CALCIUM SERPL-MCNC: 9.3 MG/DL (ref 8.8–10.2)
CHLORIDE SERPL-SCNC: 102 MMOL/L (ref 98–107)
CREAT SERPL-MCNC: 0.84 MG/DL (ref 0.67–1.17)
DEPRECATED HCO3 PLAS-SCNC: 25 MMOL/L (ref 22–29)
EOSINOPHIL # BLD AUTO: 0 10E3/UL (ref 0–0.7)
EOSINOPHIL NFR BLD AUTO: 0 %
ERYTHROCYTE [DISTWIDTH] IN BLOOD BY AUTOMATED COUNT: 12 % (ref 10–15)
GFR SERPL CREATININE-BSD FRML MDRD: >90 ML/MIN/1.73M2
GLUCOSE SERPL-MCNC: 100 MG/DL (ref 70–99)
HCT VFR BLD AUTO: 38.4 % (ref 40–53)
HGB BLD-MCNC: 12.8 G/DL (ref 13.3–17.7)
IMM GRANULOCYTES # BLD: 0.1 10E3/UL
IMM GRANULOCYTES NFR BLD: 1 %
LYMPHOCYTES # BLD AUTO: 1.9 10E3/UL (ref 0.8–5.3)
LYMPHOCYTES NFR BLD AUTO: 23 %
MCH RBC QN AUTO: 31.4 PG (ref 26.5–33)
MCHC RBC AUTO-ENTMCNC: 33.3 G/DL (ref 31.5–36.5)
MCV RBC AUTO: 94 FL (ref 78–100)
MONOCYTES # BLD AUTO: 0.5 10E3/UL (ref 0–1.3)
MONOCYTES NFR BLD AUTO: 7 %
NEUTROPHILS # BLD AUTO: 5.4 10E3/UL (ref 1.6–8.3)
NEUTROPHILS NFR BLD AUTO: 68 %
PLATELET # BLD AUTO: 352 10E3/UL (ref 150–450)
POTASSIUM SERPL-SCNC: 4.4 MMOL/L (ref 3.4–5.3)
RBC # BLD AUTO: 4.08 10E6/UL (ref 4.4–5.9)
SODIUM SERPL-SCNC: 140 MMOL/L (ref 136–145)
WBC # BLD AUTO: 7.9 10E3/UL (ref 4–11)

## 2023-06-28 PROCEDURE — 99204 OFFICE O/P NEW MOD 45 MIN: CPT | Performed by: PHYSICIAN ASSISTANT

## 2023-06-28 PROCEDURE — 80048 BASIC METABOLIC PNL TOTAL CA: CPT | Performed by: PHYSICIAN ASSISTANT

## 2023-06-28 PROCEDURE — 36415 COLL VENOUS BLD VENIPUNCTURE: CPT | Performed by: PHYSICIAN ASSISTANT

## 2023-06-28 PROCEDURE — 85025 COMPLETE CBC W/AUTO DIFF WBC: CPT | Performed by: PHYSICIAN ASSISTANT

## 2023-06-28 ASSESSMENT — PAIN SCALES - GENERAL: PAINLEVEL: NO PAIN (0)

## 2023-06-28 NOTE — PROGRESS NOTES
[facility]    Background: Transitional Care Management program identified per system criteria and reviewed by [facility] team for possible outreach.    Assessment: Upon chart review, [facility] Team member will not proceed with patient outreach related to this episode of Transitional Care Management program due to reason below:    Patient has a follow up appointment with an appropriate provider today for hospital discharge    Plan: Transitional Care Management episode addressed appropriately per reason noted above.      Cynthia Pichardo MA  [facility]    *Connected Care Resource Team does NOT follow patient ongoing. Referrals are identified based on internal discharge reports and the outreach is to ensure patient has an understanding of their discharge instructions.

## 2023-06-29 NOTE — RESULT ENCOUNTER NOTE
Fuentes Ochoa,     Labs are stable!  Cleared for surgery.  I will send this to your surgical team.    Wilber,    Saurav Ashby PA-C  [facility]

## 2023-07-05 ENCOUNTER — TELEPHONE (OUTPATIENT)
Dept: FAMILY MEDICINE | Facility: CLINIC | Age: 73
End: 2023-07-05
Payer: MEDICARE

## 2023-07-05 NOTE — TELEPHONE ENCOUNTER
General Call    Contacts       Type Contact Phone/Fax    07/05/2023 03:38 PM CDT Phone (Incoming) Don Venegas (Self) [phone] (M)        Reason for Call:  Establish Care    What are your questions or concerns:  Pt was looking for an Establish Care appointment with Dr. Ashby. He first met with him a few weeks ago and really liked him. Dr. Ashby didn't come up for me as accepting new patients. Pt is wondering if Dr. Ashby would be willing to take him on as his primary care provider.    Date of last appointment with provider: 06/28/2023    Could we send this information to you in [facility] or would you prefer to receive a phone call?:   No preference   Okay to leave a detailed message?: Yes at Cell number on file:    Telephone Information:   Mobile [phone]

## 2023-07-10 ENCOUNTER — ANESTHESIA EVENT (OUTPATIENT)
Dept: SURGERY | Facility: CLINIC | Age: 73
End: 2023-07-10
Payer: MEDICARE

## 2023-07-10 NOTE — PROGRESS NOTES
PTA medications updated by Medication Scribe prior to surgery via phone call with patient (last doses completed by Nurse)     Medication history sources: Patient, Surescripts and H&P  In the past week, patient estimated taking medication this percent of the time: Greater than 90%      Significant changes made to the medication list:  None      Additional medication history information:   None    Medication reconciliation completed by provider prior to medication history? No    Time spent in this activity: 35 minutes    The information provided in this note is only as accurate as the sources available at the time of update(s)      Prior to Admission medications    Medication Sig Last Dose Taking? Auth Provider Long Term End Date   abiraterone (ZYTIGA) 250 MG tablet Take 1,000 mg by mouth daily  at AM Yes Unknown, Entered By History     Multiple Minerals-Vitamins (PROSTEON) TABS Take 1 capsule by mouth 2 times daily  at PM Yes Unknown, Entered By History     NONFORMULARY Take 2 capsules by mouth every evening (Intestinal cleanser by Dr. Kelley's) 7/9/2023 at PM Yes Reported, Patient     NONFORMULARY Patient takes a mens herbal supplement daily which includes saw palmetto. 7/4/2023 at AM Yes Unknown, Entered By History     predniSONE (DELTASONE) 5 MG tablet Take 5 mg by mouth 2 times daily  at AM Yes Unknown, Entered By History     tamsulosin (FLOMAX) 0.4 MG capsule Take 1 capsule (0.4 mg) by mouth daily Future refills to Urology, Dr. Dey 7/10/2023 at AM Yes Alexandre Redmond MD

## 2023-07-11 ENCOUNTER — ANESTHESIA (OUTPATIENT)
Dept: SURGERY | Facility: CLINIC | Age: 73
End: 2023-07-11
Payer: MEDICARE

## 2023-07-11 ENCOUNTER — APPOINTMENT (OUTPATIENT)
Dept: GENERAL RADIOLOGY | Facility: CLINIC | Age: 73
End: 2023-07-11
Attending: UROLOGY
Payer: MEDICARE

## 2023-07-11 ENCOUNTER — HOSPITAL ENCOUNTER (OUTPATIENT)
Facility: CLINIC | Age: 73
Discharge: HOME | End: 2023-07-12
Attending: UROLOGY | Admitting: UROLOGY
Payer: MEDICARE

## 2023-07-11 PROCEDURE — 258N000003 HC RX IP 258 OP 636: Performed by: NURSE ANESTHETIST, CERTIFIED REGISTERED

## 2023-07-11 PROCEDURE — 250N000011 HC RX IP 250 OP 636: Mod: JZ | Performed by: PHYSICIAN ASSISTANT

## 2023-07-11 PROCEDURE — C2617 STENT, NON-COR, TEM W/O DEL: HCPCS | Performed by: UROLOGY

## 2023-07-11 PROCEDURE — 710N000009 HC RECOVERY PHASE 1, LEVEL 1, PER MIN: Performed by: UROLOGY

## 2023-07-11 PROCEDURE — 360N000076 HC SURGERY LEVEL 3, PER MIN: Performed by: UROLOGY

## 2023-07-11 PROCEDURE — 250N000011 HC RX IP 250 OP 636: Mod: JZ | Performed by: NURSE ANESTHETIST, CERTIFIED REGISTERED

## 2023-07-11 PROCEDURE — 250N000025 HC SEVOFLURANE, PER MIN: Performed by: UROLOGY

## 2023-07-11 PROCEDURE — 88305 TISSUE EXAM BY PATHOLOGIST: CPT | Mod: TC | Performed by: UROLOGY

## 2023-07-11 PROCEDURE — 370N000017 HC ANESTHESIA TECHNICAL FEE, PER MIN: Performed by: UROLOGY

## 2023-07-11 PROCEDURE — 258N000001 HC RX 258: Performed by: UROLOGY

## 2023-07-11 PROCEDURE — 999N000179 XR SURGERY CARM FLUORO LESS THAN 5 MIN W STILLS: Mod: TC

## 2023-07-11 PROCEDURE — C1769 GUIDE WIRE: HCPCS | Performed by: UROLOGY

## 2023-07-11 PROCEDURE — 272N000001 HC OR GENERAL SUPPLY STERILE: Performed by: UROLOGY

## 2023-07-11 PROCEDURE — 250N000013 HC RX MED GY IP 250 OP 250 PS 637: Performed by: PHYSICIAN ASSISTANT

## 2023-07-11 PROCEDURE — 250N000013 HC RX MED GY IP 250 OP 250 PS 637: Performed by: UROLOGY

## 2023-07-11 PROCEDURE — C1758 CATHETER, URETERAL: HCPCS | Performed by: UROLOGY

## 2023-07-11 PROCEDURE — 258N000001 HC RX 258

## 2023-07-11 PROCEDURE — 250N000009 HC RX 250: Performed by: NURSE ANESTHETIST, CERTIFIED REGISTERED

## 2023-07-11 PROCEDURE — 999N000141 HC STATISTIC PRE-PROCEDURE NURSING ASSESSMENT: Performed by: UROLOGY

## 2023-07-11 PROCEDURE — 250N000012 HC RX MED GY IP 250 OP 636 PS 637: Performed by: UROLOGY

## 2023-07-11 DEVICE — URETERAL STENT
Type: IMPLANTABLE DEVICE | Site: KIDNEY | Status: NON-FUNCTIONAL
Brand: POLARIS™ ULTRA
Removed: 2023-08-22

## 2023-07-11 RX ORDER — NALOXONE HYDROCHLORIDE 0.4 MG/ML
0.2 INJECTION, SOLUTION INTRAMUSCULAR; INTRAVENOUS; SUBCUTANEOUS
Status: DISCONTINUED | OUTPATIENT
Start: 2023-07-11 | End: 2023-07-12 | Stop reason: HOSPADM

## 2023-07-11 RX ORDER — DEXAMETHASONE SODIUM PHOSPHATE 4 MG/ML
INJECTION, SOLUTION INTRA-ARTICULAR; INTRALESIONAL; INTRAMUSCULAR; INTRAVENOUS; SOFT TISSUE PRN
Status: DISCONTINUED | OUTPATIENT
Start: 2023-07-11 | End: 2023-07-11

## 2023-07-11 RX ORDER — OXYCODONE AND ACETAMINOPHEN 5; 325 MG/1; MG/1
1-2 TABLET ORAL EVERY 6 HOURS PRN
Status: DISCONTINUED | OUTPATIENT
Start: 2023-07-11 | End: 2023-07-12 | Stop reason: HOSPADM

## 2023-07-11 RX ORDER — ACETAMINOPHEN 325 MG/1
975 TABLET ORAL ONCE
Status: COMPLETED | OUTPATIENT
Start: 2023-07-11 | End: 2023-07-11

## 2023-07-11 RX ORDER — SODIUM CHLORIDE, SODIUM LACTATE, POTASSIUM CHLORIDE, CALCIUM CHLORIDE 600; 310; 30; 20 MG/100ML; MG/100ML; MG/100ML; MG/100ML
INJECTION, SOLUTION INTRAVENOUS CONTINUOUS
Status: DISCONTINUED | OUTPATIENT
Start: 2023-07-11 | End: 2023-07-11 | Stop reason: HOSPADM

## 2023-07-11 RX ORDER — PROPOFOL 10 MG/ML
INJECTION, EMULSION INTRAVENOUS PRN
Status: DISCONTINUED | OUTPATIENT
Start: 2023-07-11 | End: 2023-07-11

## 2023-07-11 RX ORDER — OXYBUTYNIN CHLORIDE 5 MG/1
5 TABLET ORAL 2 TIMES DAILY PRN
Status: DISCONTINUED | OUTPATIENT
Start: 2023-07-11 | End: 2023-07-12 | Stop reason: HOSPADM

## 2023-07-11 RX ORDER — CIPROFLOXACIN 2 MG/ML
400 INJECTION, SOLUTION INTRAVENOUS ONCE
Status: COMPLETED | OUTPATIENT
Start: 2023-07-11 | End: 2023-07-11

## 2023-07-11 RX ORDER — SODIUM CHLORIDE, SODIUM LACTATE, POTASSIUM CHLORIDE, CALCIUM CHLORIDE 600; 310; 30; 20 MG/100ML; MG/100ML; MG/100ML; MG/100ML
INJECTION, SOLUTION INTRAVENOUS CONTINUOUS PRN
Status: DISCONTINUED | OUTPATIENT
Start: 2023-07-11 | End: 2023-07-11

## 2023-07-11 RX ORDER — EPHEDRINE SULFATE 50 MG/ML
INJECTION, SOLUTION INTRAMUSCULAR; INTRAVENOUS; SUBCUTANEOUS PRN
Status: DISCONTINUED | OUTPATIENT
Start: 2023-07-11 | End: 2023-07-11

## 2023-07-11 RX ORDER — HYDROMORPHONE HCL IN WATER/PF 6 MG/30 ML
0.4 PATIENT CONTROLLED ANALGESIA SYRINGE INTRAVENOUS EVERY 5 MIN PRN
Status: DISCONTINUED | OUTPATIENT
Start: 2023-07-11 | End: 2023-07-11 | Stop reason: HOSPADM

## 2023-07-11 RX ORDER — DOCUSATE SODIUM 100 MG/1
100 CAPSULE, LIQUID FILLED ORAL 2 TIMES DAILY
Status: DISCONTINUED | OUTPATIENT
Start: 2023-07-11 | End: 2023-07-12 | Stop reason: HOSPADM

## 2023-07-11 RX ORDER — LIDOCAINE 40 MG/G
CREAM TOPICAL
Status: DISCONTINUED | OUTPATIENT
Start: 2023-07-11 | End: 2023-07-12 | Stop reason: HOSPADM

## 2023-07-11 RX ORDER — NALOXONE HYDROCHLORIDE 0.4 MG/ML
0.4 INJECTION, SOLUTION INTRAMUSCULAR; INTRAVENOUS; SUBCUTANEOUS
Status: DISCONTINUED | OUTPATIENT
Start: 2023-07-11 | End: 2023-07-12 | Stop reason: HOSPADM

## 2023-07-11 RX ORDER — HYDROMORPHONE HCL IN WATER/PF 6 MG/30 ML
0.2 PATIENT CONTROLLED ANALGESIA SYRINGE INTRAVENOUS EVERY 5 MIN PRN
Status: DISCONTINUED | OUTPATIENT
Start: 2023-07-11 | End: 2023-07-11 | Stop reason: HOSPADM

## 2023-07-11 RX ORDER — ONDANSETRON 2 MG/ML
4 INJECTION INTRAMUSCULAR; INTRAVENOUS EVERY 30 MIN PRN
Status: DISCONTINUED | OUTPATIENT
Start: 2023-07-11 | End: 2023-07-11 | Stop reason: HOSPADM

## 2023-07-11 RX ORDER — ACETAMINOPHEN 325 MG/1
650 TABLET ORAL EVERY 6 HOURS PRN
Status: DISCONTINUED | OUTPATIENT
Start: 2023-07-11 | End: 2023-07-12 | Stop reason: HOSPADM

## 2023-07-11 RX ORDER — PREDNISONE 5 MG/1
5 TABLET ORAL 2 TIMES DAILY WITH MEALS
Status: DISCONTINUED | OUTPATIENT
Start: 2023-07-11 | End: 2023-07-12 | Stop reason: HOSPADM

## 2023-07-11 RX ORDER — FENTANYL CITRATE 50 UG/ML
INJECTION, SOLUTION INTRAMUSCULAR; INTRAVENOUS PRN
Status: DISCONTINUED | OUTPATIENT
Start: 2023-07-11 | End: 2023-07-11

## 2023-07-11 RX ORDER — ONDANSETRON 4 MG/1
4 TABLET, ORALLY DISINTEGRATING ORAL EVERY 30 MIN PRN
Status: DISCONTINUED | OUTPATIENT
Start: 2023-07-11 | End: 2023-07-11 | Stop reason: HOSPADM

## 2023-07-11 RX ORDER — FENTANYL CITRATE 0.05 MG/ML
25 INJECTION, SOLUTION INTRAMUSCULAR; INTRAVENOUS EVERY 5 MIN PRN
Status: DISCONTINUED | OUTPATIENT
Start: 2023-07-11 | End: 2023-07-11 | Stop reason: HOSPADM

## 2023-07-11 RX ORDER — ONDANSETRON 2 MG/ML
INJECTION INTRAMUSCULAR; INTRAVENOUS PRN
Status: DISCONTINUED | OUTPATIENT
Start: 2023-07-11 | End: 2023-07-11

## 2023-07-11 RX ORDER — FENTANYL CITRATE 0.05 MG/ML
50 INJECTION, SOLUTION INTRAMUSCULAR; INTRAVENOUS EVERY 5 MIN PRN
Status: DISCONTINUED | OUTPATIENT
Start: 2023-07-11 | End: 2023-07-11 | Stop reason: HOSPADM

## 2023-07-11 RX ORDER — DEXMEDETOMIDINE HYDROCHLORIDE 4 UG/ML
INJECTION, SOLUTION INTRAVENOUS PRN
Status: DISCONTINUED | OUTPATIENT
Start: 2023-07-11 | End: 2023-07-11

## 2023-07-11 RX ORDER — KETOROLAC TROMETHAMINE 30 MG/ML
INJECTION, SOLUTION INTRAMUSCULAR; INTRAVENOUS PRN
Status: DISCONTINUED | OUTPATIENT
Start: 2023-07-11 | End: 2023-07-11

## 2023-07-11 RX ORDER — LIDOCAINE HYDROCHLORIDE 20 MG/ML
INJECTION, SOLUTION INFILTRATION; PERINEURAL PRN
Status: DISCONTINUED | OUTPATIENT
Start: 2023-07-11 | End: 2023-07-11

## 2023-07-11 RX ORDER — LABETALOL HYDROCHLORIDE 5 MG/ML
10 INJECTION, SOLUTION INTRAVENOUS
Status: DISCONTINUED | OUTPATIENT
Start: 2023-07-11 | End: 2023-07-11 | Stop reason: HOSPADM

## 2023-07-11 RX ORDER — ONDANSETRON 4 MG/1
4 TABLET, ORALLY DISINTEGRATING ORAL EVERY 6 HOURS PRN
Status: DISCONTINUED | OUTPATIENT
Start: 2023-07-11 | End: 2023-07-12 | Stop reason: HOSPADM

## 2023-07-11 RX ADMIN — DEXMEDETOMIDINE HYDROCHLORIDE 8 MCG: 200 INJECTION INTRAVENOUS at 09:26

## 2023-07-11 RX ADMIN — Medication 5 MG: at 09:48

## 2023-07-11 RX ADMIN — PROPOFOL 140 MG: 10 INJECTION, EMULSION INTRAVENOUS at 08:43

## 2023-07-11 RX ADMIN — FENTANYL CITRATE 25 MCG: 50 INJECTION, SOLUTION INTRAMUSCULAR; INTRAVENOUS at 09:10

## 2023-07-11 RX ADMIN — ACETAMINOPHEN 975 MG: 325 TABLET, FILM COATED ORAL at 07:29

## 2023-07-11 RX ADMIN — ACETAMINOPHEN 650 MG: 325 TABLET, FILM COATED ORAL at 17:14

## 2023-07-11 RX ADMIN — Medication 5 MG: at 09:09

## 2023-07-11 RX ADMIN — PREDNISONE 5 MG: 5 TABLET ORAL at 21:56

## 2023-07-11 RX ADMIN — DEXAMETHASONE SODIUM PHOSPHATE 4 MG: 4 INJECTION, SOLUTION INTRA-ARTICULAR; INTRALESIONAL; INTRAMUSCULAR; INTRAVENOUS; SOFT TISSUE at 08:50

## 2023-07-11 RX ADMIN — DOCUSATE SODIUM 100 MG: 100 CAPSULE, LIQUID FILLED ORAL at 12:44

## 2023-07-11 RX ADMIN — SODIUM CHLORIDE 3000 ML: 900 IRRIGANT IRRIGATION at 13:53

## 2023-07-11 RX ADMIN — ONDANSETRON 4 MG: 2 INJECTION INTRAMUSCULAR; INTRAVENOUS at 08:50

## 2023-07-11 RX ADMIN — DEXMEDETOMIDINE HYDROCHLORIDE 4 MCG: 200 INJECTION INTRAVENOUS at 09:33

## 2023-07-11 RX ADMIN — SODIUM CHLORIDE, POTASSIUM CHLORIDE, SODIUM LACTATE AND CALCIUM CHLORIDE: 600; 310; 30; 20 INJECTION, SOLUTION INTRAVENOUS at 08:40

## 2023-07-11 RX ADMIN — FENTANYL CITRATE 25 MCG: 50 INJECTION, SOLUTION INTRAMUSCULAR; INTRAVENOUS at 08:42

## 2023-07-11 RX ADMIN — KETOROLAC TROMETHAMINE 15 MG: 30 INJECTION, SOLUTION INTRAMUSCULAR at 09:54

## 2023-07-11 RX ADMIN — LIDOCAINE HYDROCHLORIDE 60 MG: 20 INJECTION, SOLUTION INFILTRATION; PERINEURAL at 08:43

## 2023-07-11 RX ADMIN — DOCUSATE SODIUM 100 MG: 100 CAPSULE, LIQUID FILLED ORAL at 21:56

## 2023-07-11 RX ADMIN — FENTANYL CITRATE 25 MCG: 50 INJECTION, SOLUTION INTRAMUSCULAR; INTRAVENOUS at 09:32

## 2023-07-11 RX ADMIN — SODIUM CHLORIDE 3000 ML: 900 IRRIGANT IRRIGATION at 13:54

## 2023-07-11 RX ADMIN — CIPROFLOXACIN 400 MG: 400 INJECTION, SOLUTION INTRAVENOUS at 07:43

## 2023-07-11 RX ADMIN — FENTANYL CITRATE 25 MCG: 50 INJECTION, SOLUTION INTRAMUSCULAR; INTRAVENOUS at 09:15

## 2023-07-11 RX ADMIN — Medication 5 MG: at 08:54

## 2023-07-11 ASSESSMENT — ACTIVITIES OF DAILY LIVING (ADL)
ADLS_ACUITY_SCORE: 18
ADLS_ACUITY_SCORE: 18
ADLS_ACUITY_SCORE: 33
ADLS_ACUITY_SCORE: 18

## 2023-07-11 NOTE — PROGRESS NOTES
POD0 R ureteral stent exchange and TUR. VSS on RA. A&Ox4. Ax1 GB/W. Has not gotten OOB yet. Complains of minimal pain at laughlin site but refuses percocet. Requesting tylenol, mac SMALL, no response yet. Laughlin in place with NS CBI at slow rate. UO is mostly clear but per report from PACU nurse, MD wants CBI overnight. PIV SL. Advancing diet as tolerated. Pt has had applesauce and water with no complaints. Pt is allergic to gluten. No skin concerns ex scattered bruising. Potential discharge tomorrow.

## 2023-07-11 NOTE — OR NURSING
Dr. Claros consulted.  Patient cleared to transfer to the floor.   Detail Level: Simple Render Risk Assessment In Note?: no Additional Notes: Lab order provided to patient today.

## 2023-07-11 NOTE — OP NOTE
[facility] Operative Note    Pre-operative diagnosis: Prostate cancer, urinary retention; retained right ureteral stent   Post-operative diagnosis: Same   Procedure: Procedure(s):  Cystoscopy, transurethral resection (TUR) prostate, combined  RIGHT URETERAL STENT EXCHANGE     Surgeon: Kasi Dey MD   Assistant(s): Kasi Dey MD      Anesthesia: General mask     Estimated blood loss: 10 mL   Drains: May catheter   Specimens: Prostate chips   Implants: None   Complications: None   Condition: Patient taken to recovery in stable condition.     Findings: 6x26JJ stent exchanged, channel TURP, 22F 3-way     Indication:  72M presents with urinary retention and obstruction at the bladder outlet, associated with recent metastatic prostate cancer diagnosis. At presentation he had a ureteral stone for which he was stented.  The risks, benefits and alternatives were discussed.  The patient would like to proceed with channel TURP, as well as ureteral stent exchange.      Procedure:  The patient was brought to the operating room in good condition and transferred from the [facility] to the OR table.  General anesthesia was induced by the anesthesia team.  He was given as perioperative antibiotics. All pressure points were well padded. A time out was performed and the patient was identified by name, procedure, and identification number.    Position: Dorsal lithotomy  Prep: Standard & sterile  Scope: 26F resectoscope  Method: Loop resection (bipolar), TURP settings  Irrigant: 0.9% NS    Technique: A 21F rigid cystoscope was first passed and inventory taken. Outlet obstruction was noted, but given his recent ADT some regression of BPH and/or prostate cancer tissue was visualized. There were no concerning lesions or findings in the bladder. The previously placed right ureteral stent was grasped and brought to the meatus. A wire was passed through it into the right renal collecting system, confirmed by  fluoroscopy. A new 6x26JJ stent was positioned over the wire with good coiling.     The resectoscope was then passed using the obturator and then the resecting element with cutting loop was reassembled. The bladder was systematically surveyed, the position of the ureteral orifices were noted and preserved throughout the case. At this point, resection commenced. The proximal and distal margins were at the bladder neck and just proximal to the verumontanum , respectively. Depth of resection was continued down to a level that was consistent with fibers of the prostatic capsule. Resection commenced by creating two troughs at the 5 and 7 o clock positions to improve flow and visibility. A circumferential resection was then performed, first left then right. The final area of resection consisted of the anterior ceiling. All chips were initially pushed into the bladder and then irrigated out at the end of the case. Hemostasis was obtained using point cauterization. The bladder was again inspected, no evidence of clots or residual chips were seen. The scope was removed and a 22F 3-way catheter was placed via urethra. Drainage was stable and acceptably clear. The procedure was terminated. The patient tolerated the procedure well, there were no complications. He was transferred to PACU in stable condition.    Disposition: To PACU, then the surgical talley for overnight observation on CBI. He'll need eventual definitive stone management once recovered from his outlet procedure.    Thank you for involving me in this patient's care. Do not hesitate to contact me for questions.    Kasi Dey MD  [facility]

## 2023-07-11 NOTE — ANESTHESIA PROCEDURE NOTES
Airway       Patient location during procedure: OR  Staff -        Anesthesiologist:  Chintan Mckeon MD       CRNA: Vikki Richardson APRN CRNA       Performed By: CRNA  Consent for Airway        Urgency: elective  Indications and Patient Condition       Indications for airway management: evelyn-procedural and airway protection       Induction type:intravenous       Mask difficulty assessment: 0 - not attempted    Final Airway Details       Final airway type: supraglottic airway    Supraglottic Airway Details        Type: LMA       Brand: I-Gel       LMA size: 5    Post intubation assessment        Placement verified by: capnometry and chest rise        Number of attempts at approach: 1       Number of other approaches attempted: 0       Secured with: silk tape       Ease of procedure: easy       Dentition: Unchanged

## 2023-07-11 NOTE — ANESTHESIA CARE TRANSFER NOTE
Patient: Don Venegas    Procedure: Procedure(s):  Cystoscopy, transurethral resection (TUR) prostate, combined  RIGHT URETERAL STENT EXCHANGE       Diagnosis: Benign localized prostatic hyperplasia with lower urinary tract symptoms (LUTS) [N40.1]  Diagnosis Additional Information: No value filed.    Anesthesia Type:   General     Note:    Oropharynx: oropharynx clear of all foreign objects and spontaneously breathing  Level of Consciousness: drowsy  Oxygen Supplementation: face mask  Level of Supplemental Oxygen (L/min / FiO2): 6  Independent Airway: airway patency satisfactory and stable  Dentition: dentition unchanged  Vital Signs Stable: post-procedure vital signs reviewed and stable  Report to RN Given: handoff report given  Patient transferred to: PACU    Handoff Report: Identifed the Patient, Identified the Reponsible Provider, Reviewed the pertinent medical history, Discussed the surgical course, Reviewed Intra-OP anesthesia mangement and issues during anesthesia, Set expectations for post-procedure period and Allowed opportunity for questions and acknowledgement of understanding      Vitals:  Vitals Value Taken Time   /71 07/11/23 1006   Temp     Pulse 54 07/11/23 1008   Resp 9 07/11/23 1008   SpO2 100 % 07/11/23 1008       Electronically Signed By: DALILA Bryant CRNA  July 11, 2023  10:10 AM

## 2023-07-11 NOTE — PROVIDER NOTIFICATION
MD Notification    Notified Person: MD    Notified Person Name:  Albaromarzenadawson    Notification Date/Time: 07/11/2023 1312    Notification Interaction: vocera    Purpose of Notification: pt had percocet ordered but is requesting just tylenol. Can you order? Thanks    Orders Received:    Comments:

## 2023-07-11 NOTE — ANESTHESIA PREPROCEDURE EVALUATION
Anesthesia Pre-Procedure Evaluation    Patient: Don Venegas   MRN: [id number] : 1950        Procedure : Procedure(s):  Cystoscopy, transurethral resection (TUR) prostate, combined  RIGHT URETERAL STENT EXCHANGE          Past Medical History:   Diagnosis Date     Prostate cancer (H)       Past Surgical History:   Procedure Laterality Date     BIOPSY  2023     CYSTOSCOPY, TRANSURETHRAL RESECTION (TUR) PROSTATE, COMBINED N/A 2023    Procedure: CYSTOSCOPY, LIMITED TRANSURETHRAL RESECTION OF PROSTATE;  Surgeon: Matthias Cuellar MD;  Location: [facility] OR     IR NEPHROSTOMY TUBE PLACEMENT RIGHT  2023     IR URETERAL STENT PLACEMENT RIGHT  2023      Allergies   Allergen Reactions     Gluten Meal       Social History     Tobacco Use     Smoking status: Never     Smokeless tobacco: Never   Substance Use Topics     Alcohol use: Not Currently      Wt Readings from Last 1 Encounters:   23 63.5 kg (139 lb 14.4 oz)        Anesthesia Evaluation            ROS/MED HX  ENT/Pulmonary:    (-) sleep apnea   Neurologic:       Cardiovascular:       METS/Exercise Tolerance:     Hematologic:       Musculoskeletal:       GI/Hepatic:    (-) GERD   Renal/Genitourinary: Comment: Recent admission with UTI with metabolic encephalopathy and expressive aphasia;      Endo:       Psychiatric/Substance Use:       Infectious Disease:       Malignancy:   (+) Malignancy, History of Prostate.    Other:            Physical Exam    Airway        Mallampati: II   TM distance: > 3 FB   Neck ROM: full   Mouth opening: > 3 cm    Respiratory Devices and Support         Dental       (+) Minor Abnormalities - some fillings, tiny chips      Cardiovascular   cardiovascular exam normal          Pulmonary   pulmonary exam normal                OUTSIDE LABS:  CBC:   Lab Results   Component Value Date    WBC 7.9 2023    WBC 9.9 2023    HGB 12.8 (L) 2023    HGB 11.5 (L) 2023    HCT 38.4 (L) 2023     HCT 34.5 (L) 06/25/2023     06/28/2023     06/25/2023     BMP:   Lab Results   Component Value Date     06/28/2023     06/24/2023    POTASSIUM 4.4 06/28/2023    POTASSIUM 3.7 06/24/2023    CHLORIDE 102 06/28/2023    CHLORIDE 108 (H) 06/24/2023    CO2 25 06/28/2023    CO2 20 (L) 06/24/2023    BUN 19.0 06/28/2023    BUN 11.8 06/24/2023    CR 0.84 06/28/2023    CR 0.74 06/24/2023     (H) 06/28/2023     (H) 06/24/2023     COAGS:   Lab Results   Component Value Date    PTT 28 06/22/2023    INR 1.03 06/22/2023     POC: No results found for: BGM, HCG, HCGS  HEPATIC:   Lab Results   Component Value Date    ALBUMIN 4.1 06/22/2023    PROTTOTAL 7.2 06/22/2023    ALT 18 06/22/2023    AST 18 06/22/2023    ALKPHOS 98 06/22/2023    BILITOTAL 0.6 06/22/2023     OTHER:   Lab Results   Component Value Date    PH 7.36 04/22/2023    LACT 0.8 06/23/2023    A1C 5.3 06/22/2023    MIKY 9.3 06/28/2023       Anesthesia Plan    ASA Status:  2   NPO Status:  NPO Appropriate    Anesthesia Type: General.     - Airway: LMA   Induction: Intravenous.   Maintenance: Balanced.        Consents    Anesthesia Plan(s) and associated risks, benefits, and realistic alternatives discussed. Questions answered and patient/representative(s) expressed understanding.    - Discussed:     - Discussed with:  Patient         Postoperative Care    Pain management: IV analgesics.   PONV prophylaxis: Ondansetron (or other 5HT-3)     Comments:                DAR ANGELES MD

## 2023-07-11 NOTE — ANESTHESIA POSTPROCEDURE EVALUATION
Patient: Don Venegas    Procedure: Procedure(s):  Cystoscopy, transurethral resection (TUR) prostate, combined  RIGHT URETERAL STENT EXCHANGE       Anesthesia Type:  General    Note:     Postop Pain Control: Uneventful            Sign Out: Well controlled pain   PONV: No   Neuro/Psych: Uneventful            Sign Out: Acceptable/Baseline neuro status   Airway/Respiratory: Uneventful            Sign Out: Acceptable/Baseline resp. status   CV/Hemodynamics: Uneventful            Sign Out: Acceptable CV status; No obvious hypovolemia; No obvious fluid overload   Other NRE: NONE   DID A NON-ROUTINE EVENT OCCUR? No           Last vitals:  Vitals Value Taken Time   /67 07/11/23 1045   Temp 36.4  C (97.5  F) 07/11/23 1030   Pulse 60 07/11/23 1058   Resp 24 07/11/23 1058   SpO2 98 % 07/11/23 1058   Vitals shown include unvalidated device data.    Electronically Signed By: Claude Claros MD  July 11, 2023  10:59 AM

## 2023-07-11 NOTE — PLAN OF CARE
Goal Outcome Evaluation:      Plan of Care Reviewed With: patient    Overall Patient Progress: no changeOverall Patient Progress: no change    [id number]  A&OX4, VSS, CB infusing at slow drip with urine maintaining clear yellow/pink-tinged coloring with no clots. Still not OOB. Tolerating diet.

## 2023-07-12 VITALS
TEMPERATURE: 97.9 F | HEIGHT: 70 IN | SYSTOLIC BLOOD PRESSURE: 120 MMHG | WEIGHT: 136.6 LBS | DIASTOLIC BLOOD PRESSURE: 59 MMHG | RESPIRATION RATE: 18 BRPM | OXYGEN SATURATION: 98 % | BODY MASS INDEX: 19.56 KG/M2 | HEART RATE: 58 BPM

## 2023-07-12 LAB
ALBUMIN SERPL BCG-MCNC: 3.6 G/DL (ref 3.5–5.2)
ALP SERPL-CCNC: 88 U/L (ref 40–129)
ALT SERPL W P-5'-P-CCNC: 23 U/L (ref 0–70)
ANION GAP SERPL CALCULATED.3IONS-SCNC: 9 MMOL/L (ref 7–15)
AST SERPL W P-5'-P-CCNC: 15 U/L (ref 0–45)
BILIRUB SERPL-MCNC: 0.8 MG/DL
BUN SERPL-MCNC: 14.1 MG/DL (ref 8–23)
CALCIUM SERPL-MCNC: 8.9 MG/DL (ref 8.8–10.2)
CHLORIDE SERPL-SCNC: 104 MMOL/L (ref 98–107)
CREAT SERPL-MCNC: 0.83 MG/DL (ref 0.67–1.17)
DEPRECATED HCO3 PLAS-SCNC: 25 MMOL/L (ref 22–29)
ERYTHROCYTE [DISTWIDTH] IN BLOOD BY AUTOMATED COUNT: 12.9 % (ref 10–15)
GFR SERPL CREATININE-BSD FRML MDRD: >90 ML/MIN/1.73M2
GLUCOSE SERPL-MCNC: 101 MG/DL (ref 70–99)
HCT VFR BLD AUTO: 37.1 % (ref 40–53)
HGB BLD-MCNC: 12.2 G/DL (ref 13.3–17.7)
MCH RBC QN AUTO: 31.4 PG (ref 26.5–33)
MCHC RBC AUTO-ENTMCNC: 32.9 G/DL (ref 31.5–36.5)
MCV RBC AUTO: 96 FL (ref 78–100)
PATH REPORT.COMMENTS IMP SPEC: ABNORMAL
PATH REPORT.COMMENTS IMP SPEC: ABNORMAL
PATH REPORT.COMMENTS IMP SPEC: YES
PATH REPORT.FINAL DX SPEC: ABNORMAL
PATH REPORT.GROSS SPEC: ABNORMAL
PATH REPORT.MICROSCOPIC SPEC OTHER STN: ABNORMAL
PATH REPORT.RELEVANT HX SPEC: ABNORMAL
PHOTO IMAGE: ABNORMAL
PLATELET # BLD AUTO: 264 10E3/UL (ref 150–450)
POTASSIUM SERPL-SCNC: 4 MMOL/L (ref 3.4–5.3)
PROT SERPL-MCNC: 5.8 G/DL (ref 6.4–8.3)
RBC # BLD AUTO: 3.88 10E6/UL (ref 4.4–5.9)
SODIUM SERPL-SCNC: 138 MMOL/L (ref 136–145)
WBC # BLD AUTO: 11.1 10E3/UL (ref 4–11)

## 2023-07-12 PROCEDURE — 80053 COMPREHEN METABOLIC PANEL: CPT | Performed by: UROLOGY

## 2023-07-12 PROCEDURE — 88305 TISSUE EXAM BY PATHOLOGIST: CPT | Mod: 26 | Performed by: PATHOLOGY

## 2023-07-12 PROCEDURE — 250N000012 HC RX MED GY IP 250 OP 636 PS 637: Performed by: UROLOGY

## 2023-07-12 PROCEDURE — 85027 COMPLETE CBC AUTOMATED: CPT | Performed by: UROLOGY

## 2023-07-12 PROCEDURE — 36415 COLL VENOUS BLD VENIPUNCTURE: CPT | Performed by: UROLOGY

## 2023-07-12 RX ADMIN — PREDNISONE 5 MG: 5 TABLET ORAL at 08:40

## 2023-07-12 ASSESSMENT — ACTIVITIES OF DAILY LIVING (ADL)
ADLS_ACUITY_SCORE: 18
ADLS_ACUITY_SCORE: 23
ADLS_ACUITY_SCORE: 23
ADLS_ACUITY_SCORE: 18

## 2023-07-12 NOTE — PROGRESS NOTES
[facility]    Urology Progress Note  Date of Service: 07/12/2023      Interval History   Stable.  Feels well.  Denies any abdominal pain.  CBI clamped this morning.  Sitting up in chair.  Tolerating oral intake.  No nausea or vomiting.    AVSS.  Creatinine: 0.83  Hemoglobin: 12.2      Assessment & Plan   Don Venegas is a 72 year old male who presented on 7/11/2023 for surgery.   Patient with a history of prostate cancer, urinary retention, nephrolithiasis, right ureteral stent in situ.     He underwent cystoscopy, transurethral resection of the prostate, right ureteral stent exchange with Dr. Dey on 7/11/2023.  A 22 [place] three-way catheter was left in place at the conclusion of the procedure and patient was initiated on continuous bladder irrigation and admitted to the hospital for observation.    Patient is doing well this morning.  CBI is off and urine output is adequate.    To discharge home today.  After visit summary updated.  Patient will follow-up next week with Dr. Dey for postop check and Laughlin catheter removal.  We will also need to discuss treatment for his nephrolithiasis.    Physical Exam   Temp:  [97.3  F (36.3  C)-98.7  F (37.1  C)] 97.9  F (36.6  C)  Pulse:  [54-77] 58  Resp:  [9-18] 18  BP: (114-139)/(55-67) 120/59  SpO2:  [95 %-100 %] 98 %    Weights:   Vitals:    07/11/23 0718   Weight: 62 kg (136 lb 9.6 oz)    Body mass index is 19.6 kg/m .    Constitutional: Alert. Pleasant. No acute distress.   CV: Perfusing well. Radial pulses strong.  Respiratory: No respiratory distress. Breathing unlabored.   GI: Soft, nontender.  Male :laughlin in place, light watermelon/peach outputs off cbi, no clots, draining well.   Skin: Warm and dry.  Musculoskeletal: Moving all extremities approprietly.    Data   Recent Labs   Lab 07/12/23  0655   WBC 11.1*   HGB 12.2*   MCV 96         POTASSIUM 4.0   CHLORIDE 104   CO2 25   BUN 14.1   CR 0.83   ANIONGAP 9    MIKY 8.9   *   ALBUMIN 3.6   PROTTOTAL 5.8*   BILITOTAL 0.8   ALKPHOS 88   ALT 23   AST 15       Recent Labs   Lab 07/12/23  0655   *        Unresulted Labs Ordered in the Past 30 Days of this Admission     Date and Time Order Name Status Description    7/11/2023  9:43 AM Surgical Pathology Exam In process            Imaging  No results found for this or any previous visit (from the past 24 hour(s)).       I have discussed patient with attending physician, Dr. Yissel Dickson PA-C on 7/12/2023 at 10:42 AM   [facility]

## 2023-07-12 NOTE — PLAN OF CARE
Goal Outcome Evaluation:       Patient A&Ox4. VSS on RA. Up walking independently. Denies any pain. May education went over with patient. Urine is clear yellow.  Discharge instruction and medication went over with patient. Questions and concerns addressed. Patient discharge to home.

## 2023-07-12 NOTE — PROVIDER NOTIFICATION
MD Notification    Notified Person: MD    Notified Person Name:      Notification Date/Time: 7/11/23 915pm    Notification Interaction: phone page    Purpose of Notification: pt states he was told to take his prednisone 5mg tonight and then resume his regimen of prednisone 5mg BID and abiraterone 1000mg every day in AM starting tomorrow morning. Please order if appropriate.    Orders Received:    Comments:

## 2023-07-12 NOTE — PLAN OF CARE
Goal Outcome Evaluation:      Plan of Care Reviewed With: patient    Overall Patient Progress: improvingOverall Patient Progress: improving     POD#1 for cystoscopy, transurethral resection of prostate, Right ureteral stent exchange. Aox4. Pleasant. VSS on RA exc bradycardic. CBI running slow rate, yellow/pink. Up SBA in hallway x1 last night. Denied pain, Prednisone given per pt request following MD order, Denies N/V. Tolerating reg diet. Will continue to monitor.

## 2023-07-20 ENCOUNTER — OFFICE VISIT (OUTPATIENT)
Dept: FAMILY MEDICINE | Facility: CLINIC | Age: 73
End: 2023-07-20
Payer: MEDICARE

## 2023-07-20 VITALS
OXYGEN SATURATION: 95 % | DIASTOLIC BLOOD PRESSURE: 74 MMHG | HEART RATE: 71 BPM | RESPIRATION RATE: 16 BRPM | WEIGHT: 141.5 LBS | BODY MASS INDEX: 20.26 KG/M2 | SYSTOLIC BLOOD PRESSURE: 131 MMHG | TEMPERATURE: 97.8 F | HEIGHT: 70 IN

## 2023-07-20 DIAGNOSIS — Z12.11 SCREEN FOR COLON CANCER: ICD-10-CM

## 2023-07-20 DIAGNOSIS — Z76.89 ENCOUNTER TO ESTABLISH CARE: Primary | ICD-10-CM

## 2023-07-20 DIAGNOSIS — C61 PROSTATE CANCER (H): ICD-10-CM

## 2023-07-20 PROCEDURE — 99213 OFFICE O/P EST LOW 20 MIN: CPT | Performed by: PHYSICIAN ASSISTANT

## 2023-07-20 ASSESSMENT — PAIN SCALES - GENERAL: PAINLEVEL: NO PAIN (0)

## 2023-07-20 NOTE — PROGRESS NOTES
Assessment & Plan     Encounter to establish care  Plan for annual wellness visit in 3 months.  Plan to be discussed colon cancer screening options and pursue one of them.  Additionally, need to check cholesterol as well as thyroid.  Encouraged him to contact me sooner with any issues.    Prostate cancer (H)  Continue close follow-up with urology.  Getting regular blood work every 2 weeks with them.  Suspect dizziness may be secondary to Flomax.  Encourage nighttime dosing.    Screen for colon cancer  Has never had any type of colon cancer screening.  Reviewed different options including colonoscopy versus Cologuard versus FIT testing.  He will think about these options and plan to discuss this further at his upcoming annual wellness visit.  Has no family history of colon cancer.      20 minutes spent by me on the date of the encounter doing chart review, review of test results, interpretation of tests, patient visit and documentation      MED REC REQUIRED  Post Medication Reconciliation Status: discharge medications reconciled and changed, per note/orders    Return in about 3 months (around 10/20/2023) for follow-up per plan.    The likelihood of other entities in the differential is insufficient to justify any further testing for them at this time. This was explained to the patient. The patient was advised that persistent or worsening symptoms would require further evaluation. Patient advised to call the office and if unable to reach to go to the emergency room if they develop any new or worsening symptoms. Expressed understanding and agreement with above stated plan.     [facility]   Don Venegas is a 72 year old male presenting for the following health issues:  Patient presents with:  Establish Care  Dizziness: Pt report light headedness, possible side effect from medications. Patient states that is becoming more pronounced.     Here today to  "officially establish care.  His TURP procedure went well.  Has follow-up appointment with urology tomorrow to hopefully get his catheter removed.  Does note some dizziness with quick movements.  He suspects secondary to medication.  Denies chest pain, shortness of breath, or dyspnea on exertion.    Retired.  Previously was a .  Lives in [place].  States that his last annual physical was back when he was in high school.    Review of Systems   Constitutional, HEENT, cardiovascular, pulmonary, GI, , musculoskeletal, neuro, skin, endocrine and psych systems are negative, except as otherwise noted.      Objective    /74 (BP Location: Right arm, Patient Position: Sitting, Cuff Size: Adult Regular)   Pulse 71   Temp 97.8  F (36.6  C) (Temporal)   Resp 16   Ht 1.778 m (5' 10\")   Wt 64.2 kg (141 lb 8 oz)   SpO2 95%   BMI 20.30 kg/m    5' 10\"  141 lbs 8 oz    Allergies   Allergen Reactions     Gluten Meal      Current Outpatient Medications   Medication Sig Dispense Refill     abiraterone (ZYTIGA) 250 MG tablet Take 1,000 mg by mouth daily       Multiple Minerals-Vitamins (PROSTEON) TABS Take 2 capsules by mouth 2 times daily       NONFORMULARY Take 2 capsules by mouth every evening (Intestinal cleanser by Dr. Kelley's)       NONFORMULARY Patient takes a mens herbal supplement daily which includes saw palmetto.       predniSONE (DELTASONE) 5 MG tablet Take 5 mg by mouth 2 times daily       tamsulosin (FLOMAX) 0.4 MG capsule Take 1 capsule (0.4 mg) by mouth daily Future refills to Urology, Dr. Dey 30 capsule 0     Past Medical History:   Diagnosis Date     Prostate cancer (H)      Past Surgical History:   Procedure Laterality Date     BIOPSY  4.22.2023     CYSTOSCOPY, TRANSURETHRAL RESECTION (TUR) PROSTATE, COMBINED N/A 04/23/2023    Procedure: CYSTOSCOPY, LIMITED TRANSURETHRAL RESECTION OF PROSTATE;  Surgeon: Matthias Cuellar MD;  Location:  OR     CYSTOSCOPY, TRANSURETHRAL RESECTION (TUR) " PROSTATE, COMBINED Right 7/11/2023    Procedure: Cystoscopy, transurethral resection (TUR) prostate, combined;  Surgeon: Kasi Dey MD;  Location: [facility] OR     CYSTOSCOPY, URETEROSCOPY, INSERT STENT Right 7/11/2023    Procedure: RIGHT URETERAL STENT EXCHANGE;  Surgeon: Kasi Dey MD;  Location:  OR     IR NEPHROSTOMY TUBE PLACEMENT RIGHT  04/24/2023     IR URETERAL STENT PLACEMENT RIGHT  04/26/2023       Physical Exam   EXAM:  GENERAL APPEARANCE: healthy, alert and no distress  EYES: Eyes grossly normal to inspection, PERRL and conjunctivae and sclerae normal  NECK: no asymmetry, masses, or scars  RESP: lungs clear to auscultation - no rales, rhonchi or wheezes  CV: regular rates and rhythm, normal S1 S2, no S3 or S4 and no murmur, click or rub  MS: extremities normal- no gross deformities noted  SKIN: no suspicious lesions or rashes  NEURO: Normal strength and tone, mentation intact and speech normal  PSYCH: mentation appears normal and affect normal        Answers for HPI/ROS submitted by the patient on 7/19/2023  What is the reason for your visit today? : Interview with Dr. Ashby for PCP  How many servings of fruits and vegetables do you eat daily?: 2-3  On average, how many sweetened beverages do you drink each day (Examples: soda, juice, sweet tea, etc.  Do NOT count diet or artificially sweetened beverages)?: 0  How many minutes a day do you exercise enough to make your heart beat faster?: 9 or less  How many days a week do you exercise enough to make your heart beat faster?: 3 or less  How many days per week do you miss taking your medication?: 0

## 2023-08-11 ENCOUNTER — OFFICE VISIT (OUTPATIENT)
Dept: FAMILY MEDICINE | Facility: CLINIC | Age: 73
End: 2023-08-11
Payer: MEDICARE

## 2023-08-11 VITALS
RESPIRATION RATE: 16 BRPM | OXYGEN SATURATION: 98 % | TEMPERATURE: 98.8 F | HEIGHT: 70 IN | BODY MASS INDEX: 21.19 KG/M2 | WEIGHT: 148 LBS | SYSTOLIC BLOOD PRESSURE: 125 MMHG | DIASTOLIC BLOOD PRESSURE: 70 MMHG | HEART RATE: 74 BPM

## 2023-08-11 DIAGNOSIS — R32 URINARY INCONTINENCE, UNSPECIFIED TYPE: ICD-10-CM

## 2023-08-11 DIAGNOSIS — Z85.46 HISTORY OF PROSTATE CANCER: ICD-10-CM

## 2023-08-11 DIAGNOSIS — Z01.818 PRE-OP EXAM: Primary | ICD-10-CM

## 2023-08-11 LAB
ANION GAP SERPL CALCULATED.3IONS-SCNC: 10 MMOL/L (ref 7–15)
BASOPHILS # BLD AUTO: 0 10E3/UL (ref 0–0.2)
BASOPHILS NFR BLD AUTO: 1 %
BUN SERPL-MCNC: 16.4 MG/DL (ref 8–23)
CALCIUM SERPL-MCNC: 9.5 MG/DL (ref 8.8–10.2)
CHLORIDE SERPL-SCNC: 104 MMOL/L (ref 98–107)
CREAT SERPL-MCNC: 0.86 MG/DL (ref 0.67–1.17)
DEPRECATED HCO3 PLAS-SCNC: 28 MMOL/L (ref 22–29)
EOSINOPHIL # BLD AUTO: 0 10E3/UL (ref 0–0.7)
EOSINOPHIL NFR BLD AUTO: 0 %
ERYTHROCYTE [DISTWIDTH] IN BLOOD BY AUTOMATED COUNT: 13 % (ref 10–15)
GFR SERPL CREATININE-BSD FRML MDRD: >90 ML/MIN/1.73M2
GLUCOSE SERPL-MCNC: 98 MG/DL (ref 70–99)
HCT VFR BLD AUTO: 43.6 % (ref 40–53)
HGB BLD-MCNC: 14.1 G/DL (ref 13.3–17.7)
IMM GRANULOCYTES # BLD: 0 10E3/UL
IMM GRANULOCYTES NFR BLD: 1 %
LYMPHOCYTES # BLD AUTO: 2.5 10E3/UL (ref 0.8–5.3)
LYMPHOCYTES NFR BLD AUTO: 30 %
MCH RBC QN AUTO: 31.4 PG (ref 26.5–33)
MCHC RBC AUTO-ENTMCNC: 32.3 G/DL (ref 31.5–36.5)
MCV RBC AUTO: 97 FL (ref 78–100)
MONOCYTES # BLD AUTO: 0.5 10E3/UL (ref 0–1.3)
MONOCYTES NFR BLD AUTO: 6 %
NEUTROPHILS # BLD AUTO: 5.1 10E3/UL (ref 1.6–8.3)
NEUTROPHILS NFR BLD AUTO: 62 %
PLATELET # BLD AUTO: 315 10E3/UL (ref 150–450)
POTASSIUM SERPL-SCNC: 4.9 MMOL/L (ref 3.4–5.3)
RBC # BLD AUTO: 4.49 10E6/UL (ref 4.4–5.9)
SODIUM SERPL-SCNC: 142 MMOL/L (ref 136–145)
WBC # BLD AUTO: 8.1 10E3/UL (ref 4–11)

## 2023-08-11 PROCEDURE — 80048 BASIC METABOLIC PNL TOTAL CA: CPT | Performed by: PHYSICIAN ASSISTANT

## 2023-08-11 PROCEDURE — 36415 COLL VENOUS BLD VENIPUNCTURE: CPT | Performed by: PHYSICIAN ASSISTANT

## 2023-08-11 PROCEDURE — 99214 OFFICE O/P EST MOD 30 MIN: CPT | Performed by: PHYSICIAN ASSISTANT

## 2023-08-11 PROCEDURE — 85025 COMPLETE CBC W/AUTO DIFF WBC: CPT | Performed by: PHYSICIAN ASSISTANT

## 2023-08-11 ASSESSMENT — PAIN SCALES - GENERAL: PAINLEVEL: NO PAIN (0)

## 2023-08-11 NOTE — PROGRESS NOTES
[address], [address]  [facility] [address]  Phone: [phone]  Primary Provider: Carolee Ashby  Pre-op Performing Provider: CAROLEE ASHBY      PREOPERATIVE EVALUATION:  Today's date: 8/11/2023    Don Venegas is a 72 year old male who presents for a preoperative evaluation.      Surgical Information:  Surgery/Procedure: CYSTOSCOPY , RIGHT URTEROSCOPY , HOLMIUM LASER LITHOTRIPSY , RIGHT URETERAL STENT PLACEMENT VERSES EXCHANGE   Surgery Location:  OR  Surgeon: Kasi Dey   Surgery Date: 08/22/2023  Time of Surgery: 7:30 AM  Where patient plans to recover: At home with family  Fax number for surgical facility: Note does not need to be faxed, will be available electronically in Epic.    Assessment & Plan     The proposed surgical procedure is considered INTERMEDIATE risk.    Pre-op exam  History of prostate cancer  Urinary incontinence, unspecified type    Cleared for surgery pending labs.  Check CBC and BMP today.  Hold NSAIDs/vitamins 1 week prior to surgery.  Provided him with referral to physical therapy for pelvic floor training which may be helpful.  Did reach out to urology in regards to his incontinence who provided him reassurance.  Comfortable with plan.  Continue all other medications as prescribed.    - CBC with platelets and differential  - Basic metabolic panel  (Ca, Cl, CO2, Creat, Gluc, K, Na, BUN)  - Physical Therapy Referral     - No identified additional risk factors other than previously addressed    RECOMMENDATION:  APPROVAL GIVEN to proceed with proposed procedure pending review of diagnostic evaluation.    30 minutes spent by me on the date of the encounter doing chart review, review of test results, interpretation of tests, patient visit, and documentation       Subjective       HPI related to upcoming procedure:     Upcoming cystoscopy/urethroscopy on 8/22.    He has past medical history significant for prostate cancer. Has  ongoing follow-up with urology. Currently without catheter in place.  Experiencing occasional urinary incontinence, questions whether Kegel exercises/physical therapy could be helpful.  Has had no issues in the past with anesthesia. Denies history of heart attack, stroke, or blood clot. Denies chest pain, shortness of breath, palpitations, or new leg swelling.         8/11/2023     8:58 AM   Preop Questions   1. Have you ever had a heart attack or stroke? No   2. Have you ever had surgery on your heart or blood vessels, such as a stent placement, a coronary artery bypass, or surgery on an artery in your head, neck, heart, or legs? No   3. Do you have chest pain with activity? No   4. Do you have a history of  heart failure? No   5. Do you currently have a cold, bronchitis or symptoms of other infection? No   6. Do you have a cough, shortness of breath, or wheezing? No   7. Do you or anyone in your family have previous history of blood clots? UNKNOWN   8. Do you or does anyone in your family have a serious bleeding problem such as prolonged bleeding following surgeries or cuts? UNKNOWN   9. Have you ever had problems with anemia or been told to take iron pills? UNKNOWN   10. Have you had any abnormal blood loss such as black, tarry or bloody stools? No   11. Have you ever had a blood transfusion? No   12. Are you willing to have a blood transfusion if it is medically needed before, during, or after your surgery? Yes   13. Have you or any of your relatives ever had problems with anesthesia? UNKNOWN   14. Do you have sleep apnea, excessive snoring or daytime drowsiness? No   15. Do you have any artifical heart valves or other implanted medical devices like a pacemaker, defibrillator, or continuous glucose monitor? No   16. Do you have artificial joints? No   17. Are you allergic to latex? No     Health Care Directive:  Patient does not have a Health Care Directive or Living Will:  Discussed advance care planning.   Completing forms.    Preoperative Review of :   reviewed - no record of controlled substances prescribed.      Review of Systems  CONSTITUTIONAL: NEGATIVE for fever, chills, change in weight  INTEGUMENTARY/SKIN: NEGATIVE for worrisome rashes, moles or lesions  EYES: NEGATIVE for vision changes or irritation  ENT/MOUTH: NEGATIVE for ear, mouth and throat problems  RESP: NEGATIVE for significant cough or SOB  CV: NEGATIVE for chest pain, palpitations or peripheral edema  GI: NEGATIVE for nausea, abdominal pain, heartburn, or change in bowel habits  : NEGATIVE for frequency, dysuria, or hematuria  MUSCULOSKELETAL: NEGATIVE for significant arthralgias or myalgia  NEURO: NEGATIVE for weakness, dizziness or paresthesias  ENDOCRINE: NEGATIVE for temperature intolerance, skin/hair changes  HEME: NEGATIVE for bleeding problems  PSYCHIATRIC: NEGATIVE for changes in mood or affect    Patient Active Problem List    Diagnosis Date Noted    UTI (urinary tract infection) 06/24/2023     Priority: Medium    Dizziness 06/22/2023     Priority: Medium    Expressive aphasia 06/22/2023     Priority: Medium    Prostate cancer (H) 04/27/2023     Priority: Medium    Urinary retention 04/22/2023     Priority: Medium    Pyelonephritis, acute 04/22/2023     Priority: Medium    SHANON (acute kidney injury) (H) 04/22/2023     Priority: Medium      Past Medical History:   Diagnosis Date    Prostate cancer (H)      Past Surgical History:   Procedure Laterality Date    BIOPSY  4.22.2023    CYSTOSCOPY, TRANSURETHRAL RESECTION (TUR) PROSTATE, COMBINED N/A 04/23/2023    Procedure: CYSTOSCOPY, LIMITED TRANSURETHRAL RESECTION OF PROSTATE;  Surgeon: Matthias Cuellar MD;  Location:  OR    CYSTOSCOPY, TRANSURETHRAL RESECTION (TUR) PROSTATE, COMBINED Right 7/11/2023    Procedure: Cystoscopy, transurethral resection (TUR) prostate, combined;  Surgeon: Kasi Dey MD;  Location:  OR    CYSTOSCOPY, URETEROSCOPY, INSERT STENT Right  "7/11/2023    Procedure: RIGHT URETERAL STENT EXCHANGE;  Surgeon: Kasi Dey MD;  Location: [facility] OR    IR NEPHROSTOMY TUBE PLACEMENT RIGHT  04/24/2023    IR URETERAL STENT PLACEMENT RIGHT  04/26/2023     Current Outpatient Medications   Medication Sig Dispense Refill    abiraterone (ZYTIGA) 250 MG tablet Take 1,000 mg by mouth daily      Multiple Minerals-Vitamins (PROSTEON) TABS Take 2 capsules by mouth 2 times daily      NONFORMULARY Take 2 capsules by mouth every evening (Intestinal cleanser by Dr. Kelley's)      NONFORMULARY Patient takes a mens herbal supplement daily which includes saw palmetto.      predniSONE (DELTASONE) 5 MG tablet Take 5 mg by mouth 2 times daily      tamsulosin (FLOMAX) 0.4 MG capsule Take 1 capsule (0.4 mg) by mouth daily Future refills to Urology, Dr. Dey 30 capsule 0       Allergies   Allergen Reactions    Gluten Meal         Social History     Tobacco Use    Smoking status: Never    Smokeless tobacco: Never   Substance Use Topics    Alcohol use: Not Currently     Family History   Problem Relation Age of Onset    Diabetes Mother     Cerebrovascular Disease Father     Thyroid Disease Sister     Thyroid Disease Sister     Breast Cancer Paternal Grandmother     Coronary Artery Disease Paternal Grandfather     Other Cancer Other         maternal aunt     History   Drug Use Unknown         Objective     /70   Pulse 74   Temp 98.8  F (37.1  C) (Tympanic)   Resp 16   Ht 1.778 m (5' 10\")   Wt 67.1 kg (148 lb)   SpO2 98%   BMI 21.24 kg/m      Physical Exam    GENERAL APPEARANCE: healthy, alert and no distress     EYES: EOMI,  PERRL     NECK: no adenopathy, no asymmetry, masses, or scars and thyroid normal to palpation     RESP: lungs clear to auscultation - no rales, rhonchi or wheezes     CV: regular rates and rhythm, normal S1 S2, no S3 or S4 and no murmur, click or rub     ABDOMEN:  soft, nontender, no HSM or masses and bowel sounds normal     MS: extremities " normal- no gross deformities noted, no evidence of inflammation in joints, FROM in all extremities.     SKIN: no suspicious lesions or rashes     NEURO: Normal strength and tone, sensory exam grossly normal, mentation intact and speech normal     PSYCH: mentation appears normal. and affect normal/bright     LYMPHATICS: No cervical adenopathy    Recent Labs   Lab Test 07/12/23  0655 06/28/23  1113 06/23/23  0812 06/22/23  2042 04/25/23  0758 04/23/23  2254   HGB 12.2* 12.8*   < > 13.8   < >  --     352   < > 318   < >  --    INR  --   --   --  1.03  --  1.18*    140   < > 133*   < >  --    POTASSIUM 4.0 4.4   < > 3.8   < >  --    CR 0.83 0.84   < > 0.97   < >  --    A1C  --   --   --  5.3  --   --     < > = values in this interval not displayed.        Diagnostics:  Labs pending at this time.  Results will be reviewed when available.   No EKG required, no history of coronary heart disease, significant arrhythmia, peripheral arterial disease or other structural heart disease.    Revised Cardiac Risk Index (RCRI):  The patient has the following serious cardiovascular risks for perioperative complications:   - No serious cardiac risks = 0 points     RCRI Interpretation: 0 points: Class I (very low risk - 0.4% complication rate)    The likelihood of other entities in the differential is insufficient to justify any further testing for them at this time. This was explained to the patient. The patient was advised that persistent or worsening symptoms would require further evaluation. Patient advised to call the office and if unable to reach to go to the emergency room if they develop any new or worsening symptoms. Expressed understanding and agreement with above stated plan.       Signed Electronically by: Saurav Ashby PA-C  Copy of this evaluation report is provided to requesting physician.

## 2023-08-14 NOTE — RESULT ENCOUNTER NOTE
Steffen Ochoa,     All looks good for surgery! Improving hemoglobin which is great.     Let me know if you have any questions or concerns,     Saurav Ashby PA-C  [facility]

## 2023-08-21 ENCOUNTER — ANESTHESIA EVENT (OUTPATIENT)
Dept: SURGERY | Facility: CLINIC | Age: 73
End: 2023-08-21
Payer: MEDICARE

## 2023-08-21 NOTE — ANESTHESIA PREPROCEDURE EVALUATION
Anesthesia Pre-Procedure Evaluation    Patient: Don Venegas   MRN: [id number] : 1950        Procedure : Procedure(s):  CYSTOSCOPY , RIGHT URTEROSCOPY , HOLMIUM LASER LITHOTRIPSY , RIGHT URETERAL STENT PLACEMENT VERSES EXCHANGE          Past Medical History:   Diagnosis Date    SHANON (acute kidney injury) (H)     Dizziness     Expressive aphasia     Prostate cancer (H)     Pyelonephritis, acute     Urinary retention     UTI (urinary tract infection)       Past Surgical History:   Procedure Laterality Date    BIOPSY  2023    CYSTOSCOPY, TRANSURETHRAL RESECTION (TUR) PROSTATE, COMBINED N/A 2023    Procedure: CYSTOSCOPY, LIMITED TRANSURETHRAL RESECTION OF PROSTATE;  Surgeon: Matthias Cuellar MD;  Location: [facility] OR    CYSTOSCOPY, TRANSURETHRAL RESECTION (TUR) PROSTATE, COMBINED Right 2023    Procedure: Cystoscopy, transurethral resection (TUR) prostate, combined;  Surgeon: Kasi Dey MD;  Location: [facility] OR    CYSTOSCOPY, URETEROSCOPY, INSERT STENT Right 2023    Procedure: RIGHT URETERAL STENT EXCHANGE;  Surgeon: Kasi Dey MD;  Location: [facility] OR    IR NEPHROSTOMY TUBE PLACEMENT RIGHT  2023    IR URETERAL STENT PLACEMENT RIGHT  2023      Allergies   Allergen Reactions    Gluten Meal       Social History     Tobacco Use    Smoking status: Never    Smokeless tobacco: Never   Substance Use Topics    Alcohol use: Not Currently      Wt Readings from Last 1 Encounters:   23 67.1 kg (148 lb)        Anesthesia Evaluation            ROS/MED HX  ENT/Pulmonary:    (-) tobacco use, asthma, COPD and sleep apnea   Neurologic:    (-) no seizures and no CVA   Cardiovascular:     (+)  - -   -  - -                                 Previous cardiac testing   Echo: Date: Results:    Stress Test:  Date: Results:    ECG Reviewed:  Date:  Results:  NSR  Cath:  Date: Results:   (-) hypertension, CAD, CHF and arrhythmias   METS/Exercise Tolerance:     Hematologic:        Musculoskeletal:       GI/Hepatic:    (-) GERD and liver disease   Renal/Genitourinary: Comment: Recent admission with UTI with metabolic encephalopathy and expressive aphasia;   (-) renal disease   Endo:    (-) Type I DM and Type II DM   Psychiatric/Substance Use:       Infectious Disease:       Malignancy:   (+) Malignancy, History of Prostate.    Other:            Physical Exam    Airway        Mallampati: II   TM distance: > 3 FB   Neck ROM: full   Mouth opening: > 3 cm    Respiratory Devices and Support         Dental  no notable dental history     (+) Minor Abnormalities - some fillings, tiny chips      Cardiovascular          Rhythm and rate: regular     Pulmonary           breath sounds clear to auscultation           OUTSIDE LABS:  CBC:   Lab Results   Component Value Date    WBC 8.1 08/11/2023    WBC 11.1 (H) 07/12/2023    HGB 14.1 08/11/2023    HGB 12.2 (L) 07/12/2023    HCT 43.6 08/11/2023    HCT 37.1 (L) 07/12/2023     08/11/2023     07/12/2023     BMP:   Lab Results   Component Value Date     08/11/2023     07/12/2023    POTASSIUM 4.9 08/11/2023    POTASSIUM 4.0 07/12/2023    CHLORIDE 104 08/11/2023    CHLORIDE 104 07/12/2023    CO2 28 08/11/2023    CO2 25 07/12/2023    BUN 16.4 08/11/2023    BUN 14.1 07/12/2023    CR 0.86 08/11/2023    CR 0.83 07/12/2023    GLC 98 08/11/2023     (H) 07/12/2023     COAGS:   Lab Results   Component Value Date    PTT 28 06/22/2023    INR 1.03 06/22/2023     POC: No results found for: BGM, HCG, HCGS  HEPATIC:   Lab Results   Component Value Date    ALBUMIN 3.6 07/12/2023    PROTTOTAL 5.8 (L) 07/12/2023    ALT 23 07/12/2023    AST 15 07/12/2023    ALKPHOS 88 07/12/2023    BILITOTAL 0.8 07/12/2023     OTHER:   Lab Results   Component Value Date    PH 7.36 04/22/2023    LACT 0.8 06/23/2023    A1C 5.3 06/22/2023    MIKY 9.5 08/11/2023       Anesthesia Plan    ASA Status:  2       Anesthesia Type: General.     - Airway: LMA   Induction:  Intravenous, Propofol.   Maintenance: Balanced.        Consents    Anesthesia Plan(s) and associated risks, benefits, and realistic alternatives discussed. Questions answered and patient/representative(s) expressed understanding.     - Discussed:     - Discussed with:  Patient            Postoperative Care    Pain management: Multi-modal analgesia.   PONV prophylaxis: Ondansetron (or other 5HT-3), Dexamethasone or Solumedrol     Comments:                Christopher Liu MD

## 2023-08-22 ENCOUNTER — ANESTHESIA (OUTPATIENT)
Dept: SURGERY | Facility: CLINIC | Age: 73
End: 2023-08-22
Payer: MEDICARE

## 2023-08-22 ENCOUNTER — APPOINTMENT (OUTPATIENT)
Dept: GENERAL RADIOLOGY | Facility: CLINIC | Age: 73
End: 2023-08-22
Attending: UROLOGY
Payer: MEDICARE

## 2023-08-22 ENCOUNTER — HOSPITAL ENCOUNTER (OUTPATIENT)
Facility: CLINIC | Age: 73
Discharge: HOME | End: 2023-08-22
Attending: UROLOGY | Admitting: UROLOGY
Payer: MEDICARE

## 2023-08-22 VITALS
SYSTOLIC BLOOD PRESSURE: 154 MMHG | OXYGEN SATURATION: 97 % | DIASTOLIC BLOOD PRESSURE: 75 MMHG | TEMPERATURE: 96.8 F | RESPIRATION RATE: 16 BRPM | HEIGHT: 70 IN | BODY MASS INDEX: 21.5 KG/M2 | HEART RATE: 66 BPM | WEIGHT: 150.2 LBS

## 2023-08-22 DIAGNOSIS — N20.1 RIGHT URETERAL STONE: Primary | ICD-10-CM

## 2023-08-22 PROCEDURE — C1758 CATHETER, URETERAL: HCPCS | Performed by: UROLOGY

## 2023-08-22 PROCEDURE — 360N000076 HC SURGERY LEVEL 3, PER MIN: Performed by: UROLOGY

## 2023-08-22 PROCEDURE — 258N000001 HC RX 258: Performed by: UROLOGY

## 2023-08-22 PROCEDURE — 999N000141 HC STATISTIC PRE-PROCEDURE NURSING ASSESSMENT: Performed by: UROLOGY

## 2023-08-22 PROCEDURE — 250N000011 HC RX IP 250 OP 636: Mod: JZ | Performed by: NURSE ANESTHETIST, CERTIFIED REGISTERED

## 2023-08-22 PROCEDURE — 250N000009 HC RX 250: Performed by: UROLOGY

## 2023-08-22 PROCEDURE — 272N000001 HC OR GENERAL SUPPLY STERILE: Performed by: UROLOGY

## 2023-08-22 PROCEDURE — C2617 STENT, NON-COR, TEM W/O DEL: HCPCS | Performed by: UROLOGY

## 2023-08-22 PROCEDURE — 250N000025 HC SEVOFLURANE, PER MIN: Performed by: UROLOGY

## 2023-08-22 PROCEDURE — 258N000003 HC RX IP 258 OP 636: Performed by: NURSE ANESTHETIST, CERTIFIED REGISTERED

## 2023-08-22 PROCEDURE — 250N000011 HC RX IP 250 OP 636

## 2023-08-22 PROCEDURE — 710N000009 HC RECOVERY PHASE 1, LEVEL 1, PER MIN: Performed by: UROLOGY

## 2023-08-22 PROCEDURE — C1769 GUIDE WIRE: HCPCS | Performed by: UROLOGY

## 2023-08-22 PROCEDURE — 255N000002 HC RX 255 OP 636: Mod: JZ | Performed by: UROLOGY

## 2023-08-22 PROCEDURE — 250N000009 HC RX 250: Performed by: NURSE ANESTHETIST, CERTIFIED REGISTERED

## 2023-08-22 PROCEDURE — 370N000017 HC ANESTHESIA TECHNICAL FEE, PER MIN: Performed by: UROLOGY

## 2023-08-22 PROCEDURE — 999N000179 XR SURGERY CARM FLUORO LESS THAN 5 MIN W STILLS: Mod: TC

## 2023-08-22 PROCEDURE — 710N000012 HC RECOVERY PHASE 2, PER MINUTE: Performed by: UROLOGY

## 2023-08-22 DEVICE — URETERAL STENT
Type: IMPLANTABLE DEVICE | Site: KIDNEY | Status: FUNCTIONAL
Brand: POLARIS™ ULTRA

## 2023-08-22 RX ORDER — FENTANYL CITRATE 50 UG/ML
50 INJECTION, SOLUTION INTRAMUSCULAR; INTRAVENOUS EVERY 5 MIN PRN
Status: DISCONTINUED | OUTPATIENT
Start: 2023-08-22 | End: 2023-08-22 | Stop reason: HOSPADM

## 2023-08-22 RX ORDER — LABETALOL HYDROCHLORIDE 5 MG/ML
5 INJECTION, SOLUTION INTRAVENOUS
Status: DISCONTINUED | OUTPATIENT
Start: 2023-08-22 | End: 2023-08-22 | Stop reason: HOSPADM

## 2023-08-22 RX ORDER — ONDANSETRON 2 MG/ML
4 INJECTION INTRAMUSCULAR; INTRAVENOUS EVERY 30 MIN PRN
Status: DISCONTINUED | OUTPATIENT
Start: 2023-08-22 | End: 2023-08-22 | Stop reason: HOSPADM

## 2023-08-22 RX ORDER — HYDROCODONE BITARTRATE AND ACETAMINOPHEN 5; 325 MG/1; MG/1
1-2 TABLET ORAL EVERY 4 HOURS PRN
Qty: 10 TABLET | Refills: 0 | Status: SHIPPED | OUTPATIENT
Start: 2023-08-22 | End: 2023-10-20

## 2023-08-22 RX ORDER — SODIUM CHLORIDE, SODIUM LACTATE, POTASSIUM CHLORIDE, CALCIUM CHLORIDE 600; 310; 30; 20 MG/100ML; MG/100ML; MG/100ML; MG/100ML
INJECTION, SOLUTION INTRAVENOUS CONTINUOUS PRN
Status: DISCONTINUED | OUTPATIENT
Start: 2023-08-22 | End: 2023-08-22

## 2023-08-22 RX ORDER — EPHEDRINE SULFATE 50 MG/ML
INJECTION, SOLUTION INTRAMUSCULAR; INTRAVENOUS; SUBCUTANEOUS PRN
Status: DISCONTINUED | OUTPATIENT
Start: 2023-08-22 | End: 2023-08-22

## 2023-08-22 RX ORDER — ONDANSETRON 4 MG/1
4 TABLET, ORALLY DISINTEGRATING ORAL EVERY 30 MIN PRN
Status: DISCONTINUED | OUTPATIENT
Start: 2023-08-22 | End: 2023-08-22 | Stop reason: HOSPADM

## 2023-08-22 RX ORDER — HYDROCODONE BITARTRATE AND ACETAMINOPHEN 5; 325 MG/1; MG/1
1 TABLET ORAL ONCE
Status: DISCONTINUED | OUTPATIENT
Start: 2023-08-22 | End: 2023-08-22 | Stop reason: HOSPADM

## 2023-08-22 RX ORDER — FENTANYL CITRATE 50 UG/ML
25 INJECTION, SOLUTION INTRAMUSCULAR; INTRAVENOUS EVERY 5 MIN PRN
Status: DISCONTINUED | OUTPATIENT
Start: 2023-08-22 | End: 2023-08-22 | Stop reason: HOSPADM

## 2023-08-22 RX ORDER — HYDRALAZINE HYDROCHLORIDE 20 MG/ML
2.5-5 INJECTION INTRAMUSCULAR; INTRAVENOUS
Status: DISCONTINUED | OUTPATIENT
Start: 2023-08-22 | End: 2023-08-22 | Stop reason: HOSPADM

## 2023-08-22 RX ORDER — CIPROFLOXACIN 2 MG/ML
400 INJECTION, SOLUTION INTRAVENOUS ONCE
Status: COMPLETED | OUTPATIENT
Start: 2023-08-22 | End: 2023-08-22

## 2023-08-22 RX ORDER — SODIUM CHLORIDE, SODIUM LACTATE, POTASSIUM CHLORIDE, CALCIUM CHLORIDE 600; 310; 30; 20 MG/100ML; MG/100ML; MG/100ML; MG/100ML
INJECTION, SOLUTION INTRAVENOUS CONTINUOUS
Status: DISCONTINUED | OUTPATIENT
Start: 2023-08-22 | End: 2023-08-22 | Stop reason: HOSPADM

## 2023-08-22 RX ORDER — MAGNESIUM HYDROXIDE 1200 MG/15ML
LIQUID ORAL PRN
Status: DISCONTINUED | OUTPATIENT
Start: 2023-08-22 | End: 2023-08-22 | Stop reason: HOSPADM

## 2023-08-22 RX ORDER — IOPAMIDOL 612 MG/ML
INJECTION, SOLUTION INTRAVASCULAR PRN
Status: DISCONTINUED | OUTPATIENT
Start: 2023-08-22 | End: 2023-08-22 | Stop reason: HOSPADM

## 2023-08-22 RX ORDER — OXYCODONE HYDROCHLORIDE 5 MG/1
5 TABLET ORAL
Status: DISCONTINUED | OUTPATIENT
Start: 2023-08-22 | End: 2023-08-22 | Stop reason: HOSPADM

## 2023-08-22 RX ORDER — TOLTERODINE 4 MG/1
4 CAPSULE, EXTENDED RELEASE ORAL DAILY
Qty: 30 CAPSULE | Refills: 0 | Status: SHIPPED | OUTPATIENT
Start: 2023-08-22 | End: 2023-10-20

## 2023-08-22 RX ORDER — OXYCODONE HYDROCHLORIDE 5 MG/1
10 TABLET ORAL
Status: DISCONTINUED | OUTPATIENT
Start: 2023-08-22 | End: 2023-08-22 | Stop reason: HOSPADM

## 2023-08-22 RX ORDER — LIDOCAINE HYDROCHLORIDE 20 MG/ML
INJECTION, SOLUTION INFILTRATION; PERINEURAL PRN
Status: DISCONTINUED | OUTPATIENT
Start: 2023-08-22 | End: 2023-08-22

## 2023-08-22 RX ORDER — FENTANYL CITRATE 50 UG/ML
INJECTION, SOLUTION INTRAMUSCULAR; INTRAVENOUS PRN
Status: DISCONTINUED | OUTPATIENT
Start: 2023-08-22 | End: 2023-08-22

## 2023-08-22 RX ORDER — HYDROMORPHONE HCL IN WATER/PF 6 MG/30 ML
0.2 PATIENT CONTROLLED ANALGESIA SYRINGE INTRAVENOUS EVERY 5 MIN PRN
Status: DISCONTINUED | OUTPATIENT
Start: 2023-08-22 | End: 2023-08-22 | Stop reason: HOSPADM

## 2023-08-22 RX ORDER — ONDANSETRON 2 MG/ML
INJECTION INTRAMUSCULAR; INTRAVENOUS PRN
Status: DISCONTINUED | OUTPATIENT
Start: 2023-08-22 | End: 2023-08-22

## 2023-08-22 RX ORDER — PROPOFOL 10 MG/ML
INJECTION, EMULSION INTRAVENOUS PRN
Status: DISCONTINUED | OUTPATIENT
Start: 2023-08-22 | End: 2023-08-22

## 2023-08-22 RX ADMIN — SODIUM CHLORIDE, POTASSIUM CHLORIDE, SODIUM LACTATE AND CALCIUM CHLORIDE: 600; 310; 30; 20 INJECTION, SOLUTION INTRAVENOUS at 07:38

## 2023-08-22 RX ADMIN — FENTANYL CITRATE 25 MCG: 50 INJECTION, SOLUTION INTRAMUSCULAR; INTRAVENOUS at 07:58

## 2023-08-22 RX ADMIN — ONDANSETRON 4 MG: 2 INJECTION INTRAMUSCULAR; INTRAVENOUS at 07:58

## 2023-08-22 RX ADMIN — LIDOCAINE HYDROCHLORIDE 60 MG: 20 INJECTION, SOLUTION INFILTRATION; PERINEURAL at 07:43

## 2023-08-22 RX ADMIN — CIPROFLOXACIN 400 MG: 2 INJECTION, SOLUTION INTRAVENOUS at 06:20

## 2023-08-22 RX ADMIN — Medication 5 MG: at 08:00

## 2023-08-22 RX ADMIN — PROPOFOL 200 MG: 10 INJECTION, EMULSION INTRAVENOUS at 07:43

## 2023-08-22 RX ADMIN — FENTANYL CITRATE 25 MCG: 50 INJECTION, SOLUTION INTRAMUSCULAR; INTRAVENOUS at 08:04

## 2023-08-22 RX ADMIN — FENTANYL CITRATE 50 MCG: 50 INJECTION, SOLUTION INTRAMUSCULAR; INTRAVENOUS at 07:43

## 2023-08-22 RX ADMIN — PROPOFOL 50 MG: 10 INJECTION, EMULSION INTRAVENOUS at 07:45

## 2023-08-22 RX ADMIN — Medication 5 MG: at 07:53

## 2023-08-22 ASSESSMENT — ACTIVITIES OF DAILY LIVING (ADL)
ADLS_ACUITY_SCORE: 35
ADLS_ACUITY_SCORE: 35

## 2023-08-22 ASSESSMENT — LIFESTYLE VARIABLES: TOBACCO_USE: 0

## 2023-08-22 ASSESSMENT — ENCOUNTER SYMPTOMS
SEIZURES: 0
DYSRHYTHMIAS: 0

## 2023-08-22 ASSESSMENT — COPD QUESTIONNAIRES: COPD: 0

## 2023-08-22 NOTE — OP NOTE
[facility] Urology Operative Note    Pre-operative diagnosis: Right ureteral stone with hydronephrosis   Post-operative diagnosis: Same   Procedure: Procedure(s):  CYSTOSCOPY , RIGHT URETEROSCOPY, STONE EXTRACTION, RIGHT URETERAL STENT EXCHANGE     Surgeon: Kasi Dey MD   Assistant(s): Kasi Dey MD      Anesthesia: General endotracheal anesthesia     Estimated blood loss: Minimal   Drains: None   Specimens: None   Implants: None   Complications: None   Condition: Patient taken to recovery in stable condition.     Findings: Stone particles evacuated clear, 6 x 26 stent, no string     Indication:  72M who originally presented in extremis for a distal right ureteral stone, hydronephrosis, and received PCN and stent after aborted cystoscopy/stent placement due to prostatic obstruction, returns for definitive extraction. Prostatic findings were worked up and found to be metastatic prostate cancer with local and distal spread. Treatment was initiated, outlet was managed with channel TURP and now Don presents for definitive stone extraction. The risks, benefits and alternatives were discussed.  The patient would like to proceed.       Procedure:  The patient was brought to the operating room in good condition and transferred from the [facility] to the OR table.  General anesthesia was induced by the anesthesia team.  The patient was given as perioperative antibiotics. All pressure points were well padded. A time out was performed and the patient was identified by name, procedure, and identification number.    Position: Dorsal lithotomy    Technique:      The patient was prepped and draped in the standard surgical fashion in the dorsal lithotomy position.  A rigid cystoscope with 23F sheath was placed per urethra into the bladder and the bladder was inspected and noted to be free of any suspicious lesions.  The existing right ureteral stent was grasped and brought to the meatus.  A Sensor guidewire  was placed through the pre-existing stent and passed into the renal pelvis under fluoroscopic guidance.  A semirigid ureteroscope was passed per urethra into the ureter alongside the wire and the stone material was immediately apparent in the distal ureter. It was noted to be at least partially impacted into the side wall of the ureter, but was easily mobile, possible from stent/wire manipulation. The stone material was very soft and was powderized by the exchange, apparently. This material was flushed out into the bladder.  The semirigid ureteroscope was then re-inserted and passed to the proximal ureter and the ureter was inspected and noted to be free of any additional stone fragments.  The ureteroscope was removed.     A retrograde pyelogram was obtained on the right: normal coursing ureter, with mild right hydronephrosis, no filling defects, no residual stone material.     The cystoscope was re-assembled and introduced and a 6 x 26 double J stent was placed over the guidewire under direct guidance and deployed with good coils in the renal pelvis and in the bladder.  The bladder was drained using the obturator sheath.  The patient tolerated the procedure well and post-operatively was transferred to the PACU in stable condition, extubated.    The string was removed from the stent. The patient will have it removed in the clinic in 2-4 weeks.    Disposition: To PACU then home once criteria met.    Thank you for involving me in this patient's care. Do not hesitate to contact me for questions.    Kasi Dey MD

## 2023-08-22 NOTE — ANESTHESIA CARE TRANSFER NOTE
Patient: Don Venegas    Procedure: Procedure(s):  CYSTOSCOPY , RIGHT URTEROSCOPY , RIGHT URETERAL STENT EXCHANGE       Diagnosis: Calculus of ureter [N20.1]  Diagnosis Additional Information: No value filed.    Anesthesia Type:   General     Note:    Oropharynx: oropharynx clear of all foreign objects and spontaneously breathing  Level of Consciousness: awake and drowsy  Oxygen Supplementation: nasal cannula  Level of Supplemental Oxygen (L/min / FiO2): 3  Independent Airway: airway patency satisfactory and stable  Dentition: dentition unchanged  Vital Signs Stable: post-procedure vital signs reviewed and stable  Report to RN Given: handoff report given  Patient transferred to: Phase II    Handoff Report: Identifed the Patient, Identified the Reponsible Provider, Reviewed the pertinent medical history, Discussed the surgical course, Reviewed Intra-OP anesthesia mangement and issues during anesthesia, Set expectations for post-procedure period and Allowed opportunity for questions and acknowledgement of understanding    Vitals:  Vitals Value Taken Time   BP     Temp     Pulse     Resp 26 08/22/23 0823   SpO2 99 % 08/22/23 0823   Vitals shown include unvalidated device data.    Electronically Signed By: DALILA Zheng CRNA  August 22, 2023  8:25 AM

## 2023-08-22 NOTE — ANESTHESIA POSTPROCEDURE EVALUATION
Patient: Don Venegas    Procedure: Procedure(s):  CYSTOSCOPY , RIGHT URTEROSCOPY , RIGHT URETERAL STENT EXCHANGE       Anesthesia Type:  General    Note:     Postop Pain Control: Uneventful            Sign Out: Well controlled pain   PONV:    Neuro/Psych: Uneventful            Sign Out: Acceptable/Baseline neuro status   Airway/Respiratory: Uneventful            Sign Out: Acceptable/Baseline resp. status   CV/Hemodynamics: Uneventful            Sign Out: Acceptable CV status; No obvious hypovolemia; No obvious fluid overload   Other NRE:    DID A NON-ROUTINE EVENT OCCUR?            Last vitals:  Vitals Value Taken Time   /62 08/22/23 0900   Temp 36  C (96.8  F) 08/22/23 0900   Pulse 69 08/22/23 0900   Resp 17 08/22/23 0900   SpO2 98 % 08/22/23 0900   Vitals shown include unvalidated device data.    Electronically Signed By: Christopher Liu MD  August 22, 2023  3:09 PM

## 2023-08-22 NOTE — DISCHARGE INSTRUCTIONS
Same Day Surgery Discharge Instructions for  Sedation and General Anesthesia     It's not unusual to feel dizzy, light-headed or faint for up to 24 hours after surgery or while taking pain medication.  If you have these symptoms: sit for a few minutes before standing and have someone assist you when you get up to walk or use the bathroom.    You should rest and relax for the next 24 hours. We recommend you make arrangements to have an adult stay with you for at least 24 hours after your discharge.  Avoid hazardous and strenuous activity.    DO NOT DRIVE any vehicle or operate mechanical equipment for 24 hours following the end of your surgery.  Even though you may feel normal, your reactions may be affected by the medication you have received.    Do not drink alcoholic beverages for 24 hours following surgery.     Slowly progress to your regular diet as you feel able. It's not unusual to feel nauseated and/or vomit after receiving anesthesia.  If you develop these symptoms, drink clear liquids (apple juice, ginger ale, broth, 7-up, etc. ) until you feel better.  If your nausea and vomiting persists for 24 hours, please notify your surgeon.      All narcotic pain medications, along with inactivity and anesthesia, can cause constipation. Drinking plenty of liquids and increasing fiber intake will help.    For any questions of a medical nature, call your surgeon.    Do not make important decisions for 24 hours.    If you had general anesthesia, you may have a sore throat for a couple of days related to the breathing tube used during surgery.  You may use Cepacol lozenges to help with this discomfort.  If it worsens or if you develop a fever, contact your surgeon.     If you feel your pain is not well managed with the pain medications prescribed by your surgeon, please contact your surgeon's office to let them know so they can address your concerns.      Cystoscopy and Stent Placement Discharge Instructions    During  surgery, a stent was placed in the ureter.  The ureter is the tube that drains urine from the kidney to the bladder.  The stent is placed to dilate (open) the ureter so the stone fragments can pass easily through the ureter or to decrease ureteral swelling after surgery, or to relieve an obstruction. The stent is made of rubber. The upper end of the stent curls in the kidney while the lower end rests in the bladder      Diet:  Return to the diet that you were on before the procedure, unless you are given specific diet instructions.  It is important to drink 6-8 glasses of fluids per day at home - at least 3-4 glasses should be water.    Activity:  Walk short distances and increase as your strength allows.  You may climb stairs.  Do not do strenuous exercise or heavy lifting until approved by surgeon.  Do not drive while taking narcotic pain medications.    Bathing:  You may take a shower.    While the stent is in place you may experience the following symptoms:  Blood and/or small blood clots in urine.  Bladder spasm (frequency and urgency of urination).  Discomfort or aching in the back or side where the stent is.  Burning or discomfort at the end of urine stream.    To decrease these symptoms you should:  Take pain medication as prescribed.  Drink plenty of fluids.  If you experience pain at the end of urination try not emptying your bladder completely.  If having discomfort in back or side, decrease activity.    Call your physician if these signs/symptoms are present:  Pain that is not relieved by a short rest or ordered pain medications.  Temperature at or above 101.0 F or chills.  Inability or difficulty urinating.   Excessive blood in urine.  Any questions or concerns.

## 2023-08-22 NOTE — ANESTHESIA PROCEDURE NOTES
Airway       Patient location during procedure: OR  Staff -        CRNA: Elizabeth Chase APRN CRNA       Performed By: CRNA  Consent for Airway        Urgency: elective  Indications and Patient Condition       Indications for airway management: evelyn-procedural       Induction type:intravenous       Mask difficulty assessment: 1 - vent by mask    Final Airway Details       Final airway type: supraglottic airway    Supraglottic Airway Details        Type: LMA       Brand: I-Gel       LMA size: 5    Post intubation assessment        Placement verified by: capnometry, equal breath sounds and chest rise        Number of attempts at approach: 1       Number of other approaches attempted: 0       Ease of procedure: easy       Dentition: Intact and Unchanged

## 2023-09-15 ENCOUNTER — THERAPY VISIT (OUTPATIENT)
Dept: PHYSICAL THERAPY | Facility: CLINIC | Age: 73
End: 2023-09-15
Attending: PHYSICIAN ASSISTANT
Payer: MEDICARE

## 2023-09-15 DIAGNOSIS — R32 URINARY INCONTINENCE, UNSPECIFIED TYPE: ICD-10-CM

## 2023-09-15 PROCEDURE — 97110 THERAPEUTIC EXERCISES: CPT | Mod: GP | Performed by: PHYSICAL THERAPIST

## 2023-09-15 PROCEDURE — 97161 PT EVAL LOW COMPLEX 20 MIN: CPT | Mod: GP | Performed by: PHYSICAL THERAPIST

## 2023-09-15 NOTE — PROGRESS NOTES
PHYSICAL THERAPY EVALUATION  Type of Visit: Evaluation  23 pt underwent a cystoscopy transurethral resection. 23 pt underwent a right urethral stent exchange. Pt noting urinary incontinence afterwards. Pt referred to physical therapy on 23  See electronic medical record for Abuse and Falls Screening details.    Subjective       Presenting condition or subjective complaint: Experiencing urinary incontinence  Date of onset: 23    Relevant medical history: Bladder or bowel problems; Cancer; Dizziness; Hearing problems; Incontinence; Vision problems   Dates & types of surgery: TURP (2023); Lithotripsy (2023)    Prior diagnostic imaging/testing results:       Prior therapy history for the same diagnosis, illness or injury: No      Prior Level of Function  Transfers: Independent  Ambulation: Independent  ADL: Independent  IADL: Driving, Housekeeping    Living Environment  Social support: Alone   Type of home: Apartment/condo   Stairs to enter the home: Yes 4 Is there a railing: Yes   Ramp: Yes   Stairs inside the home: No       Help at home: Assist for driving and community activities; Other  Equipment owned:       Employment: Not Applicable    Hobbies/Interests: Publication design, art studies, music    Patient goals for therapy: Limitations of mobility, physical exercise, social events, etc.    Pain assessment: Pain present  Location: groin/Ratin/10     Objective      PELVIC EVALUATION  ADDITIONAL HISTORY:  Sex assigned at birth: Male  Gender identity: Male    Pronouns:        Bladder History:  Feels bladder filling:    Triggers for feeling of inability to wait to go to the bathroom: Yes running water  How long can you wait to urinate: varies  Gets up at night to urinate: Yes 3  Can stop the flow of urine when urinating: Sometimes  Volume of urine usually released: Average   Other issues: Slow or hesitant urine stream; Trouble emptying bladder completely; Dribbling after  urinating  Number of bladder infections in last 12 months:    Fluid intake per day: 40 oz 24 oz    Medications taken for bladder: No     Activities causing urine leak: Cough; Sneeze    Amount of urine typically leaked: small amount  Pads used to help with leaking: Yes I use this many pads per day: 1 I use this type/brand: Depend    Bowel History:  Frequency of bowel movement: varies  Consistency of stool: Other varies  Ignores the urge to defecate: No  Other bowel issues: Straining to have bowel movement  Length of time spent trying to have a bowel movement: 10 min    Sexual Function History:  Sexual orientation: Straight    Sexually active: No  Lubrication used: No No  Pelvic pain:      Pain or difficulty with orgasms/erection/ejaculation:      State of menopause:    Hormone medications: Yes eligard, abiraterone, prednisone    Are you currently pregnant: No, Do you get regular exercise: No, Have you tried pelvic floor strengthening exercises for 4 weeks: No    Discussed reason for referral regarding pelvic health needs and external/internal pelvic floor muscle examination with patient/guardian.  Opportunity provided to ask questions and verbal consent for assessment and intervention was given.    PAIN: Pain Level at Rest: 0/10  Pain Level with Use: 0/10  POSTURE: WNL  LUMBAR SCREEN: AROM WNL  HIP SCREEN:  Strength: WNL   Functional Strength Testing:     PELVIC/SI SCREEN:     PAIN PROVOCATION TEST:   PELVIS/SI SPECIAL TESTS:   BREATHING SYMMETRY: WNL    PELVIC EXAM  External Visual Inspection:      Integumentary:       External Digital Palpation per Perineum:       Scar:   Location/Type:   Mobility:     Internal Digital Palpation:  Per Vagina:      Per Rectum:        Pelvic Organ Prolapse:       ABDOMINAL ASSESSMENT  Diastasis Rectus Abdominis (NEIDA):      Abdominal Activation/Strength:  WNL    Scar:   Location/Type:   Mobility:     Fascial Tension/Restriction/Tone:     BIOFEEDBACK:  Position: Supine  Surface  Electrodes: Perineal    Abdominals: Synergistic with pelvic floor    Perianals:   Baseline muscle activity: 10 microvolts  Peak Amplitude/MVC: 13 microvolts  Duration of Sustained Contraction: 3 seconds    DERMATOMES:   DTR S:     Assessment & Plan   CLINICAL IMPRESSIONS  Medical Diagnosis:      Treatment Diagnosis:     Impression/Assessment: Patient is a 72 year old male with urinary  complaints.  The following significant findings have been identified: Impaired muscle performance. These impairments interfere with their ability to perform self care tasks, recreational activities, household chores, driving , household mobility, and community mobility as compared to previous level of function.     Clinical Decision Making (Complexity):  Clinical Presentation: Stable/Uncomplicated  Clinical Presentation Rationale: based on medical and personal factors listed in PT evaluation  Clinical Decision Making (Complexity): Low complexity    PLAN OF CARE  Treatment Interventions:  Interventions: Therapeutic Exercise    Long Term Goals     PT Goal 1  Goal Identifier: incontinence  Goal Description: pt able to delay urination for 10 minutes  Rationale: to maximize safety and independence with performance of ADLs and functional tasks;to maximize safety and independence within the home;to maximize safety and independence within the community;to maximize safety and independence with transportation;to maximize safety and independence with self cares  Target Date: 11/24/23      Frequency of Treatment: 1x/week  Duration of Treatment: 10 weeks    Recommended Referrals to Other Professionals:   Education Assessment:   Learner/Method: No Barriers to Learning    Risks and benefits of evaluation/treatment have been explained.   Patient/Family/caregiver agrees with Plan of Care.     Evaluation Time:     PT Eval, Low Complexity Minutes (72459): 30       Signing Clinician: Aron Reagan PT      [facility]                                                                                    OUTPATIENT PHYSICAL THERAPY      PLAN OF TREATMENT FOR OUTPATIENT REHABILITATION   Patient's Last Name, First Name, Don Sun YOB: 1950   Provider's Name   [facility]   Medical Record No.  [id number]     Onset Date: 07/11/23  Start of Care Date: 09/15/23     Medical Diagnosis:         PT Treatment Diagnosis:    Plan of Treatment  Frequency/Duration: 1x/week/ 10 weeks    Certification date from 09/15/23 to 11/24/23         See note for plan of treatment details and functional goals     Aron Reagan, PT                         I CERTIFY THE NEED FOR THESE SERVICES FURNISHED UNDER        THIS PLAN OF TREATMENT AND WHILE UNDER MY CARE     (Physician attestation of this document indicates review and certification of the therapy plan).                Referring Provider:  Saurav Ashby      Initial Assessment  See Epic Evaluation- Start of Care Date: 09/15/23

## 2023-09-22 ENCOUNTER — THERAPY VISIT (OUTPATIENT)
Dept: PHYSICAL THERAPY | Facility: CLINIC | Age: 73
End: 2023-09-22
Attending: PHYSICIAN ASSISTANT
Payer: MEDICARE

## 2023-09-22 DIAGNOSIS — R32 URINARY INCONTINENCE, UNSPECIFIED TYPE: Primary | ICD-10-CM

## 2023-09-22 PROCEDURE — 97110 THERAPEUTIC EXERCISES: CPT | Mod: GP | Performed by: PHYSICAL THERAPIST

## 2023-09-25 ENCOUNTER — ANCILLARY PROCEDURE (OUTPATIENT)
Dept: ULTRASOUND IMAGING | Facility: CLINIC | Age: 73
End: 2023-09-25
Attending: UROLOGY
Payer: MEDICARE

## 2023-09-25 DIAGNOSIS — N20.0 CALCULUS OF KIDNEY: ICD-10-CM

## 2023-09-25 PROCEDURE — 76770 US EXAM ABDO BACK WALL COMP: CPT

## 2023-09-29 ENCOUNTER — THERAPY VISIT (OUTPATIENT)
Dept: PHYSICAL THERAPY | Facility: CLINIC | Age: 73
End: 2023-09-29
Attending: PHYSICIAN ASSISTANT
Payer: MEDICARE

## 2023-09-29 DIAGNOSIS — R32 URINARY INCONTINENCE, UNSPECIFIED TYPE: Primary | ICD-10-CM

## 2023-09-29 PROCEDURE — 97110 THERAPEUTIC EXERCISES: CPT | Mod: GP | Performed by: PHYSICAL THERAPIST

## 2023-10-05 ENCOUNTER — THERAPY VISIT (OUTPATIENT)
Dept: PHYSICAL THERAPY | Facility: CLINIC | Age: 73
End: 2023-10-05
Payer: MEDICARE

## 2023-10-05 DIAGNOSIS — R32 URINARY INCONTINENCE, UNSPECIFIED TYPE: Primary | ICD-10-CM

## 2023-10-05 PROCEDURE — 97110 THERAPEUTIC EXERCISES: CPT | Mod: GP | Performed by: PHYSICAL THERAPIST

## 2023-10-13 ENCOUNTER — THERAPY VISIT (OUTPATIENT)
Dept: PHYSICAL THERAPY | Facility: CLINIC | Age: 73
End: 2023-10-13
Payer: MEDICARE

## 2023-10-13 DIAGNOSIS — R32 URINARY INCONTINENCE, UNSPECIFIED TYPE: Primary | ICD-10-CM

## 2023-10-13 PROCEDURE — 97110 THERAPEUTIC EXERCISES: CPT | Mod: GP | Performed by: PHYSICAL THERAPIST

## 2023-10-20 ENCOUNTER — OFFICE VISIT (OUTPATIENT)
Dept: FAMILY MEDICINE | Facility: CLINIC | Age: 73
End: 2023-10-20
Payer: MEDICARE

## 2023-10-20 ENCOUNTER — LAB (OUTPATIENT)
Dept: FAMILY MEDICINE | Facility: CLINIC | Age: 73
End: 2023-10-20

## 2023-10-20 VITALS
TEMPERATURE: 97.7 F | SYSTOLIC BLOOD PRESSURE: 130 MMHG | OXYGEN SATURATION: 97 % | DIASTOLIC BLOOD PRESSURE: 68 MMHG | WEIGHT: 161.1 LBS | HEIGHT: 70 IN | RESPIRATION RATE: 15 BRPM | HEART RATE: 70 BPM | BODY MASS INDEX: 23.06 KG/M2

## 2023-10-20 DIAGNOSIS — Z11.59 NEED FOR HEPATITIS C SCREENING TEST: ICD-10-CM

## 2023-10-20 DIAGNOSIS — Z12.11 SCREEN FOR COLON CANCER: ICD-10-CM

## 2023-10-20 DIAGNOSIS — Z13.6 CARDIOVASCULAR SCREENING; LDL GOAL LESS THAN 130: ICD-10-CM

## 2023-10-20 DIAGNOSIS — Z00.00 ENCOUNTER FOR ANNUAL WELLNESS EXAM IN MEDICARE PATIENT: Primary | ICD-10-CM

## 2023-10-20 DIAGNOSIS — H90.3 BILATERAL SENSORINEURAL HEARING LOSS: ICD-10-CM

## 2023-10-20 DIAGNOSIS — C61 PROSTATE CANCER (H): ICD-10-CM

## 2023-10-20 DIAGNOSIS — R47.01 EXPRESSIVE APHASIA: ICD-10-CM

## 2023-10-20 DIAGNOSIS — Z13.29 SCREENING FOR THYROID DISORDER: ICD-10-CM

## 2023-10-20 LAB
ALBUMIN SERPL BCG-MCNC: 4.6 G/DL (ref 3.5–5.2)
ALP SERPL-CCNC: 90 U/L (ref 40–129)
ALT SERPL W P-5'-P-CCNC: 23 U/L (ref 0–70)
ANION GAP SERPL CALCULATED.3IONS-SCNC: 11 MMOL/L (ref 7–15)
AST SERPL W P-5'-P-CCNC: 24 U/L (ref 0–45)
BASO+EOS+MONOS # BLD AUTO: ABNORMAL 10*3/UL
BASO+EOS+MONOS NFR BLD AUTO: ABNORMAL %
BASOPHILS # BLD AUTO: 0 10E3/UL (ref 0–0.2)
BASOPHILS NFR BLD AUTO: 0 %
BILIRUB SERPL-MCNC: 0.8 MG/DL
BUN SERPL-MCNC: 18.9 MG/DL (ref 8–23)
CALCIUM SERPL-MCNC: 9.8 MG/DL (ref 8.8–10.2)
CHLORIDE SERPL-SCNC: 102 MMOL/L (ref 98–107)
CREAT SERPL-MCNC: 0.87 MG/DL (ref 0.67–1.17)
DEPRECATED HCO3 PLAS-SCNC: 28 MMOL/L (ref 22–29)
EGFRCR SERPLBLD CKD-EPI 2021: >90 ML/MIN/1.73M2
EOSINOPHIL # BLD AUTO: 0.1 10E3/UL (ref 0–0.7)
EOSINOPHIL NFR BLD AUTO: 1 %
ERYTHROCYTE [DISTWIDTH] IN BLOOD BY AUTOMATED COUNT: 11.8 % (ref 10–15)
GLUCOSE SERPL-MCNC: 93 MG/DL (ref 70–99)
HCT VFR BLD AUTO: 44.8 % (ref 40–53)
HCV AB SERPL QL IA: NONREACTIVE
HGB BLD-MCNC: 14.4 G/DL (ref 13.3–17.7)
IMM GRANULOCYTES # BLD: 0.1 10E3/UL
IMM GRANULOCYTES NFR BLD: 0 %
LYMPHOCYTES # BLD AUTO: 4.8 10E3/UL (ref 0.8–5.3)
LYMPHOCYTES NFR BLD AUTO: 43 %
MCH RBC QN AUTO: 31.4 PG (ref 26.5–33)
MCHC RBC AUTO-ENTMCNC: 32.1 G/DL (ref 31.5–36.5)
MCV RBC AUTO: 98 FL (ref 78–100)
MONOCYTES # BLD AUTO: 0.8 10E3/UL (ref 0–1.3)
MONOCYTES NFR BLD AUTO: 7 %
NEUTROPHILS # BLD AUTO: 5.4 10E3/UL (ref 1.6–8.3)
NEUTROPHILS NFR BLD AUTO: 48 %
PLATELET # BLD AUTO: 290 10E3/UL (ref 150–450)
POTASSIUM SERPL-SCNC: 3.8 MMOL/L (ref 3.4–5.3)
PROT SERPL-MCNC: 7.1 G/DL (ref 6.4–8.3)
RBC # BLD AUTO: 4.59 10E6/UL (ref 4.4–5.9)
SODIUM SERPL-SCNC: 141 MMOL/L (ref 135–145)
TSH SERPL DL<=0.005 MIU/L-ACNC: 2.23 UIU/ML (ref 0.3–4.2)
WBC # BLD AUTO: 11.3 10E3/UL (ref 4–11)

## 2023-10-20 PROCEDURE — 85025 COMPLETE CBC W/AUTO DIFF WBC: CPT | Performed by: PHYSICIAN ASSISTANT

## 2023-10-20 PROCEDURE — 86803 HEPATITIS C AB TEST: CPT | Performed by: PHYSICIAN ASSISTANT

## 2023-10-20 PROCEDURE — G0439 PPPS, SUBSEQ VISIT: HCPCS | Performed by: PHYSICIAN ASSISTANT

## 2023-10-20 PROCEDURE — 80053 COMPREHEN METABOLIC PANEL: CPT | Performed by: PHYSICIAN ASSISTANT

## 2023-10-20 PROCEDURE — 84443 ASSAY THYROID STIM HORMONE: CPT | Performed by: PHYSICIAN ASSISTANT

## 2023-10-20 PROCEDURE — 36415 COLL VENOUS BLD VENIPUNCTURE: CPT | Performed by: PHYSICIAN ASSISTANT

## 2023-10-20 ASSESSMENT — ENCOUNTER SYMPTOMS
CHILLS: 0
WEAKNESS: 1
DIZZINESS: 1
HEMATURIA: 0
FREQUENCY: 1
DIARRHEA: 0
HEMATOCHEZIA: 0
ARTHRALGIAS: 0
CONSTIPATION: 1
PARESTHESIAS: 0
SHORTNESS OF BREATH: 0
COUGH: 0
PALPITATIONS: 0
MYALGIAS: 0
NAUSEA: 0
SORE THROAT: 0
FEVER: 0
ABDOMINAL PAIN: 0
DYSURIA: 0
HEARTBURN: 0
JOINT SWELLING: 0
EYE PAIN: 1
HEADACHES: 0
NERVOUS/ANXIOUS: 0

## 2023-10-20 ASSESSMENT — PAIN SCALES - GENERAL: PAINLEVEL: NO PAIN (0)

## 2023-10-20 ASSESSMENT — ACTIVITIES OF DAILY LIVING (ADL): CURRENT_FUNCTION: NO ASSISTANCE NEEDED

## 2023-10-20 NOTE — PROGRESS NOTES
"SUBJECTIVE:   Don is a 72 year old who presents for Preventive Visit.    Here today for his annual wellness visit.  Overall doing well.  Continues to have close follow-up with urology for history of prostate cancer. On Eligard.  Concerned over side effects vs benefit.     -Requesting audiology testing.    -Experienced expressive aphasia during hospital stay in June 2023.  Per hospitalist note unlikely neurovascular process and mostly related to urinary tract infection/toxic metabolic encephalopathy.  Requesting further evaluation with speech.  Option to see neuro in the future if needed.  No additional deficits.    -Discussed need previously for colon cancer screening.  Opted for Cologuard.    Are you in the first 12 months of your Medicare coverage?  No    Healthy Habits:     In general, how would you rate your overall health?  Fair    Frequency of exercise:  2-3 days/week    Duration of exercise:  15-30 minutes    Do you usually eat at least 4 servings of fruit and vegetables a day, include whole grains    & fiber and avoid regularly eating high fat or \"junk\" foods?  Yes    Taking medications regularly:  Yes    Medication side effects:  No muscle aches, Lightheadedness and Other    Ability to successfully perform activities of daily living:  No assistance needed    Home Safety:  No safety concerns identified    Hearing Impairment:  Difficulty following a conversation in a noisy restaurant or crowded room, need to ask people to speak up or repeat themselves and difficulty understanding soft or whispered speech    In the past 6 months, have you been bothered by leaking of urine? Yes    In general, how would you rate your overall mental or emotional health?  Good    Additional concerns today:  Yes      Today's PHQ-2 Score:       10/20/2023     9:22 AM   PHQ-2 ( 1999 [employer])   Q1: Little interest or pleasure in doing things 0   Q2: Feeling down, depressed or hopeless 0   PHQ-2 Score 0   Q1: Little interest or " pleasure in doing things Not at all   Q2: Feeling down, depressed or hopeless Not at all   PHQ-2 Score 0       Have you ever done Advance Care Planning? (For example, a Health Directive, POLST, or a discussion with a medical provider or your loved ones about your wishes): No, advance care planning information given to patient to review.  Patient plans to discuss their wishes with loved ones or provider.         Fall risk  Fallen 2 or more times in the past year?: No  Any fall with injury in the past year?: No    Cognitive Screening   1) Repeat 3 items (Leader, Season, Table)      2) Clock draw: NORMAL    3) 3 item recall: }Recalls 3 objects  Results: 3 items recalled: COGNITIVE IMPAIRMENT LESS LIKELY    Mini-CogTM Copyright [employer]. Licensed by the author for use in [facility] [employer]; reprinted with permission (sam@[facility]). All rights reserved.      Do you have sleep apnea, excessive snoring or daytime drowsiness? : no    Reviewed and updated as needed this visit by clinical staff   Tobacco  Allergies  Meds   Med Hx   Fam Hx          Reviewed and updated as needed this visit by Provider     Meds   Med Hx   Fam Hx         Social History     Tobacco Use    Smoking status: Never    Smokeless tobacco: Never   Substance Use Topics    Alcohol use: Not Currently             10/20/2023     9:21 AM   Alcohol Use   Prescreen: >3 drinks/day or >7 drinks/week? Not Applicable     Do you have a current opioid prescription? No  Do you use any other controlled substances or medications that are not prescribed by a provider? None      Current providers sharing in care for this patient include: Patient Care Team:  Saurav Ashby PA-C as PCP - General (Physician Assistant - Medical)  Saurav Ashby PA-C as Assigned PCP    The following health maintenance items are reviewed in Epic and correct as of today:  Health Maintenance   Topic Date Due    COVID-19 Vaccine (1) Never done    COLORECTAL CANCER SCREENING   Never done    HEPATITIS C SCREENING  Never done    DTAP/TDAP/TD IMMUNIZATION (1 - Tdap) Never done    ZOSTER IMMUNIZATION (1 of 2) Never done    RSV VACCINE 60+ (1 - 1-dose 60+ series) Never done    MEDICARE ANNUAL WELLNESS VISIT  Never done    Pneumococcal Vaccine: 65+ Years (1 - PCV) Never done    INFLUENZA VACCINE (1) Never done    ANNUAL REVIEW OF HM ORDERS  07/20/2024    FALL RISK ASSESSMENT  10/20/2024    LIPID  06/22/2028    ADVANCE CARE PLANNING  10/20/2028    PHQ-2 (once per calendar year)  Completed    AORTIC ANEURYSM SCREENING (SYSTEM ASSIGNED)  Completed    IPV IMMUNIZATION  Aged Out    HPV IMMUNIZATION  Aged Out    MENINGITIS IMMUNIZATION  Aged Out     Lab work is in process  Labs reviewed in EPIC  BP Readings from Last 3 Encounters:   10/20/23 130/68   08/22/23 (!) 154/75   08/11/23 125/70    Wt Readings from Last 3 Encounters:   10/20/23 73.1 kg (161 lb 1.6 oz)   08/22/23 68.1 kg (150 lb 3.2 oz)   08/11/23 67.1 kg (148 lb)                  Patient Active Problem List   Diagnosis    Urinary retention    Pyelonephritis, acute    SHANON (acute kidney injury) (H24)    Prostate cancer (H)    Dizziness    Expressive aphasia    UTI (urinary tract infection)    Urinary incontinence, unspecified type     Past Surgical History:   Procedure Laterality Date    BIOPSY  4.22.2023    CYSTOSCOPY, TRANSURETHRAL RESECTION (TUR) PROSTATE, COMBINED N/A 04/23/2023    Procedure: CYSTOSCOPY, LIMITED TRANSURETHRAL RESECTION OF PROSTATE;  Surgeon: Matthias Cuellar MD;  Location:  OR    CYSTOSCOPY, TRANSURETHRAL RESECTION (TUR) PROSTATE, COMBINED Right 7/11/2023    Procedure: Cystoscopy, transurethral resection (TUR) prostate, combined;  Surgeon: Kasi Dey MD;  Location:  OR    CYSTOSCOPY, URETEROSCOPY, INSERT STENT Right 7/11/2023    Procedure: RIGHT URETERAL STENT EXCHANGE;  Surgeon: Kasi Dey MD;  Location:  OR    IR NEPHROSTOMY TUBE PLACEMENT RIGHT  04/24/2023    IR URETERAL STENT PLACEMENT  RIGHT  04/26/2023    LASER HOLMIUM LITHOTRIPSY URETER(S), INSERT STENT, COMBINED Right 8/22/2023    Procedure: CYSTOSCOPY , RIGHT URTEROSCOPY , RIGHT URETERAL STENT EXCHANGE;  Surgeon: Kasi Dey MD;  Location:  OR       Social History     Tobacco Use    Smoking status: Never    Smokeless tobacco: Never   Substance Use Topics    Alcohol use: Not Currently     Family History   Problem Relation Age of Onset    Diabetes Mother     Cerebrovascular Disease Father     Dementia Father     Thyroid Disease Sister     Cerebrovascular Disease Sister     Hypertension Sister     Hyperlipidemia Sister     Diabetes Sister     Thyroid Disease Sister     Coronary Artery Disease Brother     Coronary Artery Disease Maternal Grandfather     Breast Cancer Paternal Grandmother     Coronary Artery Disease Paternal Grandfather     Other Cancer Other         maternal aunt         Current Outpatient Medications   Medication Sig Dispense Refill    abiraterone (ZYTIGA) 250 MG tablet Take 1,000 mg by mouth daily      Multiple Minerals-Vitamins (PROSTEON) TABS Take 2 capsules by mouth 2 times daily      NONFORMULARY Take 2 capsules by mouth every evening (Intestinal cleanser by Dr. Kelley's)      NONFORMULARY Patient takes a mens herbal supplement daily which includes saw palmetto.      predniSONE (DELTASONE) 5 MG tablet Take 5 mg by mouth 2 times daily       Allergies   Allergen Reactions    Gluten Meal      Recent Labs   Lab Test 08/11/23  1128 07/12/23  0655 06/23/23  0812 06/22/23  2042 04/23/23  1958 04/23/23  0653   A1C  --   --   --  5.3  --   --    LDL  --   --   --  87  --   --    HDL  --   --   --  54  --   --    TRIG  --   --   --  91  --   --    ALT  --  23  --  18  --  14   CR 0.86 0.83   < > 0.97   < > 1.51*   GFRESTIMATED >90 >90   < > 83   < > 49*   POTASSIUM 4.9 4.0   < > 3.8   < > 3.7    < > = values in this interval not displayed.          Review of Systems   Constitutional:  Negative for chills and fever.   HENT:   "Negative for congestion, ear pain, hearing loss and sore throat.    Eyes:  Positive for pain and visual disturbance.   Respiratory:  Negative for cough and shortness of breath.    Cardiovascular:  Negative for chest pain, palpitations and peripheral edema.   Gastrointestinal:  Positive for constipation. Negative for abdominal pain, diarrhea, heartburn, hematochezia and nausea.   Genitourinary:  Positive for frequency and urgency. Negative for dysuria, genital sores, hematuria, impotence and penile discharge.   Musculoskeletal:  Negative for arthralgias, joint swelling and myalgias.   Skin:  Negative for rash.   Neurological:  Positive for dizziness and weakness. Negative for headaches and paresthesias.   Psychiatric/Behavioral:  Negative for mood changes. The patient is not nervous/anxious.          OBJECTIVE:   /68 (BP Location: Right arm, Patient Position: Sitting, Cuff Size: Adult Regular)   Pulse 70   Temp 97.7  F (36.5  C) (Oral)   Resp 15   Ht 1.778 m (5' 10\")   Wt 73.1 kg (161 lb 1.6 oz)   SpO2 97%   BMI 23.12 kg/m   Estimated body mass index is 23.12 kg/m  as calculated from the following:    Height as of this encounter: 1.778 m (5' 10\").    Weight as of this encounter: 73.1 kg (161 lb 1.6 oz).  Physical Exam  GENERAL: healthy, alert and no distress  EYES: Eyes grossly normal to inspection, PERRL and conjunctivae and sclerae normal  HENT: ear canals and TM's normal, nose and mouth without ulcers or lesions  NECK: no adenopathy, no asymmetry, masses, or scars and thyroid normal to palpation  RESP: lungs clear to auscultation - no rales, rhonchi or wheezes  CV: regular rate and rhythm, normal S1 S2, no S3 or S4, no murmur, click or rub, no peripheral edema and peripheral pulses strong  ABDOMEN: soft, nontender, no hepatosplenomegaly, no masses and bowel sounds normal  MS: no gross musculoskeletal defects noted, no edema  SKIN: no suspicious lesions or rashes  NEURO: Normal strength and tone, " mentation intact and speech normal  PSYCH: mentation appears normal, affect normal/bright      ASSESSMENT / PLAN:   Don was seen today for physical.    Diagnoses and all orders for this visit:    Encounter for annual wellness exam in Medicare patient    Annual wellness labs today.  Declines immunizations today.  Healthy diet and exercise.  Cologuard ordered.  Close follow-up with urology.  Follow-up 1 year.  Sooner if needed.    -     CBC with platelets and differential; Future  -     Comprehensive metabolic panel (BMP + Alb, Alk Phos, ALT, AST, Total. Bili, TP); Future  -     TSH with free T4 reflex; Future  -     CBC with platelets and differential  -     Comprehensive metabolic panel (BMP + Alb, Alk Phos, ALT, AST, Total. Bili, TP)  -     TSH with free T4 reflex    Prostate cancer (H)  Close follow-up with urology.  Screen for colon cancer  Cologuard ordered.  Needs colonoscopy if positive.  -     COLOGUARD([employer]); Future    CARDIOVASCULAR SCREENING; LDL GOAL LESS THAN 130  Lipid panel performed at recent hospital visit which was reviewed.    Need for hepatitis C screening test  -     Hepatitis C Screen Reflex to HCV RNA Quant and Genotype    Screening for thyroid disorder  Significant family history for thyroid disorders.  -     TSH with free T4 reflex    Bilateral sensorineural hearing loss  -     Adult Audiology  Referral; Future    Expressive aphasia  -     Speech Therapy Referral; Future    Patient has been advised of split billing requirements and indicates understanding: Yes      COUNSELING:  Reviewed preventive health counseling, as reflected in patient instructions       Regular exercise       Healthy diet/nutrition       Vision screening       Fall risk prevention       Hepatitis C screening       Colon cancer screening        He reports that he has never smoked. He has never used smokeless tobacco.      Appropriate preventive services were discussed with this patient, including  applicable screening as appropriate for fall prevention, nutrition, physical activity, Tobacco-use cessation, weight loss and cognition.  Checklist reviewing preventive services available has been given to the patient.    Reviewed patients plan of care and provided an AVS. The Basic Care Plan (routine screening as documented in Health Maintenance) for Don meets the Care Plan requirement. This Care Plan has been established and reviewed with the Patient.          The likelihood of other entities in the differential is insufficient to justify any further testing for them at this time. This was explained to the patient. The patient was advised that persistent or worsening symptoms would require further evaluation. Patient advised to call the office and if unable to reach to go to the emergency room if they develop any new or worsening symptoms. Expressed understanding and agreement with above stated plan.     aSurav Ashby PA-C  [facility]

## 2023-11-03 ENCOUNTER — THERAPY VISIT (OUTPATIENT)
Dept: PHYSICAL THERAPY | Facility: CLINIC | Age: 73
End: 2023-11-03
Payer: MEDICARE

## 2023-11-03 DIAGNOSIS — R32 URINARY INCONTINENCE, UNSPECIFIED TYPE: Primary | ICD-10-CM

## 2023-11-03 PROCEDURE — 97110 THERAPEUTIC EXERCISES: CPT | Mod: GP | Performed by: PHYSICAL THERAPIST

## 2023-11-17 LAB — NONINV COLON CA DNA+OCC BLD SCRN STL QL: NEGATIVE

## 2023-11-17 NOTE — RESULT ENCOUNTER NOTE
Steffen Ochoa,     I hope all is well. Good news - Your Cologuard test returned negative, and should be repeated every 3 years to have comparable sensitivity to that of a colonoscopy.      Let me know if you have any questions or concerns,     Saurav Ashby PA-C  [facility]

## 2023-12-20 NOTE — PROGRESS NOTES
DISCHARGE  Reason for Discharge: pt is progressing towards goals     Equipment Issued: none    Discharge Plan: Patient to continue home program.    Referring Provider:  Saurav Ashby

## 2024-04-18 ENCOUNTER — MEDICAL CORRESPONDENCE (OUTPATIENT)
Dept: HEALTH INFORMATION MANAGEMENT | Facility: CLINIC | Age: 74
End: 2024-04-18
Payer: MEDICARE

## 2024-04-21 ENCOUNTER — MYC MEDICAL ADVICE (OUTPATIENT)
Dept: FAMILY MEDICINE | Facility: CLINIC | Age: 74
End: 2024-04-21
Payer: MEDICARE

## 2024-04-25 ENCOUNTER — OFFICE VISIT (OUTPATIENT)
Dept: FAMILY MEDICINE | Facility: CLINIC | Age: 74
End: 2024-04-25
Payer: MEDICARE

## 2024-04-25 VITALS
HEIGHT: 70 IN | OXYGEN SATURATION: 97 % | WEIGHT: 169 LBS | RESPIRATION RATE: 16 BRPM | TEMPERATURE: 96.9 F | BODY MASS INDEX: 24.2 KG/M2 | DIASTOLIC BLOOD PRESSURE: 66 MMHG | SYSTOLIC BLOOD PRESSURE: 146 MMHG | HEART RATE: 72 BPM

## 2024-04-25 DIAGNOSIS — H53.8 BLURRED VISION: ICD-10-CM

## 2024-04-25 DIAGNOSIS — R03.0 ELEVATED BLOOD PRESSURE READING WITHOUT DIAGNOSIS OF HYPERTENSION: Primary | ICD-10-CM

## 2024-04-25 DIAGNOSIS — C61 PROSTATE CANCER (H): ICD-10-CM

## 2024-04-25 PROCEDURE — 99214 OFFICE O/P EST MOD 30 MIN: CPT | Performed by: PHYSICIAN ASSISTANT

## 2024-04-25 ASSESSMENT — PAIN SCALES - GENERAL: PAINLEVEL: NO PAIN (0)

## 2024-04-25 NOTE — NURSING NOTE
"BP:  BP (!) 146/66   Pulse 72   Temp 96.9  F (36.1  C) (Temporal)   Resp 16   Ht 1.778 m (5' 10\")   Wt 76.7 kg (169 lb)   SpO2 97%   BMI 24.25 kg/m       72  Disposition: provider notified while patient in the clinic    "

## 2024-04-25 NOTE — PROGRESS NOTES
HPI: Don is a 72 yo male with prostate ca here with blurred vision and some elevated BP readings.  /60 at chiro this week  Home BP monitor not being used as he is not trusting he knows how to use this.  Pt is not diabetic and glucose normal here in Oct and normal when checked a few weeks ago at urology (he did show me is online account)  He has seen an eye doctor for this issue and no signs of glaucoma or cataracts  The vision gets more blurry as he gets more tired. Improves with rest/nap.  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  Has been on zytiga and prednisone for about one year.      Past Medical History:   Diagnosis Date    SHANON (acute kidney injury) (H24)     Dizziness     Expressive aphasia     Prostate cancer (H)     Pyelonephritis, acute     Urinary retention     UTI (urinary tract infection)      Past Surgical History:   Procedure Laterality Date    BIOPSY  4.22.2023    CYSTOSCOPY, TRANSURETHRAL RESECTION (TUR) PROSTATE, COMBINED N/A 04/23/2023    Procedure: CYSTOSCOPY, LIMITED TRANSURETHRAL RESECTION OF PROSTATE;  Surgeon: Matthias Cuellar MD;  Location:  OR    CYSTOSCOPY, TRANSURETHRAL RESECTION (TUR) PROSTATE, COMBINED Right 7/11/2023    Procedure: Cystoscopy, transurethral resection (TUR) prostate, combined;  Surgeon: Kasi Dey MD;  Location:  OR    CYSTOSCOPY, URETEROSCOPY, INSERT STENT Right 7/11/2023    Procedure: RIGHT URETERAL STENT EXCHANGE;  Surgeon: Kasi Dey MD;  Location:  OR    IR NEPHROSTOMY TUBE PLACEMENT RIGHT  04/24/2023    IR URETERAL STENT PLACEMENT RIGHT  04/26/2023    LASER HOLMIUM LITHOTRIPSY URETER(S), INSERT STENT, COMBINED Right 8/22/2023    Procedure: CYSTOSCOPY , RIGHT URTEROSCOPY , RIGHT URETERAL STENT EXCHANGE;  Surgeon: Kasi Dey MD;  Location:  OR     Social History     Tobacco Use    Smoking status: Never    Smokeless tobacco: Never   Substance Use Topics    Alcohol  "use: Not Currently     Current Outpatient Medications   Medication Sig Dispense Refill    abiraterone (ZYTIGA) 250 MG tablet Take 1,000 mg by mouth daily      Multiple Minerals-Vitamins (PROSTEON) TABS Take 2 capsules by mouth 2 times daily      NONFORMULARY Take 2 capsules by mouth every evening (Intestinal cleanser by Dr. Kelley's)      NONFORMULARY Patient takes a mens herbal supplement daily which includes saw palmetto.      predniSONE (DELTASONE) 5 MG tablet Take 5 mg by mouth 2 times daily       Allergies   Allergen Reactions    Gluten Meal Unknown    Tamsulosin Unknown     FAMILY HISTORY NOTED AND REVIEWED      PHYSICAL EXAM:    BP (!) 146/66   Pulse 72   Temp 96.9  F (36.1  C) (Temporal)   Resp 16   Ht 1.778 m (5' 10\")   Wt 76.7 kg (169 lb)   SpO2 97%   BMI 24.25 kg/m      Patient appears non toxic  Lungs: CTA bilat  Heart: RRR without m/r/g  Extr: no edema    Assessment and Plan:     (R03.0) Elevated blood pressure reading without diagnosis of hypertension  (primary encounter diagnosis)  Comment: Pt recently had BMP at [facility] so that is not repeated today. Glucose and Cr normal  Plan: abiraterone can cause elevated BP so pt to discuss alternative treatment options with urology. If there isn't a good option, we could certainly prescribed BP lowering medication.  He has a home BP monitor and will show him how to use that today so can do some monitoring.    (H53.8) Blurred vision  Comment:   Plan: blood sugar has been normal. He has seen the eye doctor and no eye dz found. May be SE of medication. Will discuss alt with urol as above.    (C61) Prostate cancer (H)  Comment:   Plan: followed my [place] urology      Lauren Givens PA-C        "

## 2024-05-01 ENCOUNTER — TRANSCRIBE ORDERS (OUTPATIENT)
Dept: OTHER | Age: 74
End: 2024-05-01

## 2024-05-01 DIAGNOSIS — H53.8 OTHER VISUAL DISTURBANCES: Primary | ICD-10-CM

## 2024-09-20 ENCOUNTER — PATIENT OUTREACH (OUTPATIENT)
Dept: CARE COORDINATION | Facility: CLINIC | Age: 74
End: 2024-09-20
Payer: MEDICARE

## 2024-10-03 ENCOUNTER — VIRTUAL VISIT (OUTPATIENT)
Dept: FAMILY MEDICINE | Facility: CLINIC | Age: 74
End: 2024-10-03
Payer: MEDICARE

## 2024-10-03 DIAGNOSIS — H90.3 BILATERAL SENSORINEURAL HEARING LOSS: ICD-10-CM

## 2024-10-03 DIAGNOSIS — C61 PROSTATE CANCER (H): ICD-10-CM

## 2024-10-03 DIAGNOSIS — C79.51 MALIGNANT NEOPLASM METASTATIC TO BONE (H): ICD-10-CM

## 2024-10-03 DIAGNOSIS — L60.8 NAIL DISCOLORATION: ICD-10-CM

## 2024-10-03 DIAGNOSIS — R03.0 ELEVATED BLOOD PRESSURE READING WITHOUT DIAGNOSIS OF HYPERTENSION: ICD-10-CM

## 2024-10-03 DIAGNOSIS — L71.9 ROSACEA: ICD-10-CM

## 2024-10-03 DIAGNOSIS — Z00.00 ENCOUNTER FOR ANNUAL WELLNESS EXAM IN MEDICARE PATIENT: Primary | ICD-10-CM

## 2024-10-03 DIAGNOSIS — R06.09 DOE (DYSPNEA ON EXERTION): ICD-10-CM

## 2024-10-03 PROCEDURE — 99214 OFFICE O/P EST MOD 30 MIN: CPT | Mod: 95 | Performed by: PHYSICIAN ASSISTANT

## 2024-10-03 PROCEDURE — G0439 PPPS, SUBSEQ VISIT: HCPCS | Mod: 95 | Performed by: PHYSICIAN ASSISTANT

## 2024-10-03 RX ORDER — EFINACONAZOLE 100 MG/ML
SOLUTION TOPICAL DAILY
Qty: 8 ML | Refills: 0 | Status: SHIPPED | OUTPATIENT
Start: 2024-10-03 | End: 2024-10-04

## 2024-10-03 SDOH — HEALTH STABILITY: PHYSICAL HEALTH: ON AVERAGE, HOW MANY DAYS PER WEEK DO YOU ENGAGE IN MODERATE TO STRENUOUS EXERCISE (LIKE A BRISK WALK)?: 4 DAYS

## 2024-10-03 SDOH — HEALTH STABILITY: PHYSICAL HEALTH: ON AVERAGE, HOW MANY MINUTES DO YOU ENGAGE IN EXERCISE AT THIS LEVEL?: 40 MIN

## 2024-10-03 ASSESSMENT — SOCIAL DETERMINANTS OF HEALTH (SDOH): HOW OFTEN DO YOU GET TOGETHER WITH FRIENDS OR RELATIVES?: TWICE A WEEK

## 2024-10-03 NOTE — PROGRESS NOTES
Don is a 73 year old who is being evaluated via a billable video visit.    How would you like to obtain your AVS? MyChart  If the video visit is dropped, the invitation should be resent by: Text to cell phone: [phone]  Will anyone else be joining your video visit? No      Assessment & Plan     Encounter for annual wellness exam in Medicare patient  Annual labs ordered. Healthy diet and exercise reviewed. Recommended routine dental, eye, and skin screenings.  Declines immunizations    - CBC with platelets and differential  - Comprehensive metabolic panel (BMP + Alb, Alk Phos, ALT, AST, Total. Bili, TP)  - Lipid panel reflex to direct LDL Fasting    Prostate cancer (H)  Malignant neoplasm of the bone  Follow-up with urology    Elevated blood pressure reading without diagnosis of hypertension  Labs.  At home blood pressure monitoring for 3 weeks.  Sent SnapTell message with results of blood pressures.  Could consider 24-hour blood pressure monitor as well.  - CBC with platelets and differential  - Comprehensive metabolic panel (BMP + Alb, Alk Phos, ALT, AST, Total. Bili, TP)    Bilateral sensorineural hearing loss  Expressive aphasia  ENT specialty care for audiology + speech therapy    Nail discoloration  Suspect onychomycosis.  Trial topical antifungal.  - Efinaconazole (JUBLIA) 10 % SOLN  Dispense: 8 mL; Refill: 0    Rosacea  Referral placed to dermatology for evaluation of rosacea  - Adult Dermatology  Referral    ROSADO (dyspnea on exertion)  Expressed my concern over the symptoms.  Seemingly longstanding and seemingly stable however concerning nonetheless.  Echo stress test ordered.  Reviewed signs symptoms in the meantime that warrant urgent/emergent reevaluation.  - Echocardiogram Exercise Stress    Patient has been advised of split billing requirements and indicates understanding: Yes        Counseling  Appropriate preventive services were addressed with this patient via screening, questionnaire,  or discussion as appropriate for fall prevention, nutrition, physical activity, Tobacco-use cessation, social engagement, weight loss and cognition.  Checklist reviewing preventive services available has been given to the patient.  Reviewed patient's diet, addressing concerns and/or questions.   The patient was instructed to see the dentist every 6 months.         Ellis Ochoa is a 73 year old, presenting for the following health issues:  Physical    Here today for his annual wellness visit virtually.  Has a few different concerns today about his health.    -Continues to follow with urology for history of prostate cancer.  On prednisone and previously  Abiraterone.  Recent switch to Xtandi.  Updated med list.  Was experiencing blurry vision/dizziness.  Will await to see if this improves his symptoms.  Off Flomax.  Otherwise just takes supplements/vitamins.    -Evaluated by one of my colleagues in April for above concerns as well as elevated blood pressure.  Has not been consistently monitoring his blood pressures at home.  Previously never elevated at prior visits.    In discussing his blood pressure further he has noted over the past 3 to 4 years episodes of chest pain as well as dyspnea on exertion.  Gets more short of breath walking up stairs.  No leg swelling or palpitations.  Significant family history for coronary artery disease.  EKG from 2023 reviewed.  No signs of ST segment abnormalities.    -Has been unable to see audiology/speech therapy which we had previously discussed for loss of hearing as well as expressive aphasia.    -Additionally, notes discoloration of the nail on his left index finger.  Lateral side of nail as well as 1 great toe.  Shows discoloration of left nail via video visit.    -Wishes to see dermatology for history of rosacea.    Video Start Time: 3:03 PM    Annual Wellness Visit     Patient has been advised of split billing requirements and indicates understanding: Yes        Health Care Directive  Patient does not have a Health Care Directive or Living Will: Discussed advance care planning with patient; however, patient declined at this time.      10/3/2024   General Health   How would you rate your overall physical health? Good   Feel stress (tense, anxious, or unable to sleep) Not at all             10/3/2024   Nutrition   Diet: Other   If other, please elaborate: No sugar diet            10/3/2024   Exercise   Days per week of moderate/strenous exercise 4 days   Average minutes spent exercising at this level 40 min              10/3/2024   Social Factors   Frequency of gathering with friends or relatives Twice a week   Worry food won't last until get money to buy more No   Food not last or not have enough money for food? No   Do you have housing? (Housing is defined as stable permanent housing and does not include staying ouside in a car, in a tent, in an abandoned building, in an overnight shelter, or couch-surfing.) Yes   Are you worried about losing your housing? No   Lack of transportation? No   Unable to get utilities (heat,electricity)? No              10/3/2024   Fall Risk   Fallen 2 or more times in the past year? No   Trouble with walking or balance? No             10/3/2024   Activities of Daily Living- Home Safety   Needs help with the following daily activites None of the above   Safety concerns in the home None of the above            10/3/2024   Dental   Dentist two times every year? (!) NO            10/3/2024   Hearing Screening   Hearing concerns? None of the above            10/3/2024   Driving Risk Screening   Patient/family members have concerns about driving No            10/3/2024   General Alertness/Fatigue Screening   Have you been more tired than usual lately? No            10/3/2024   Urinary Incontinence Screening   Bothered by leaking urine in past 6 months No            10/3/2024   TB Screening   Were you born outside of the [place]? No          Social  History     Tobacco Use    Smoking status: Never    Smokeless tobacco: Never   Vaping Use    Vaping status: Never Used   Substance Use Topics    Alcohol use: Not Currently    Drug use: Not Currently         Today's PHQ-2 Score:       10/3/2024     2:52 PM   PHQ-2 ( 1999 [employer])   Q1: Little interest or pleasure in doing things 0   Q2: Feeling down, depressed or hopeless 0   PHQ-2 Score 0   Q1: Little interest or pleasure in doing things Not at all   Q2: Feeling down, depressed or hopeless Not at all   PHQ-2 Score 0           10/3/2024   AAA Screening   Family history of Abdominal Aortic Aneurysm (AAA)? No      ASCVD Risk   The 10-year ASCVD risk score (Sabrina WADE, et al., 2019) is: 24%    Values used to calculate the score:      Age: 73 years      Sex: Male      Is Non- : No      Diabetic: No      Tobacco smoker: No      Systolic Blood Pressure: 146 mmHg      Is BP treated: No      HDL Cholesterol: 54 mg/dL      Total Cholesterol: 159 mg/dL              Reviewed and updated as needed this visit by Provider     Meds                Past Medical History:   Diagnosis Date    SHANON (acute kidney injury) (H)     Dizziness     Expressive aphasia     Prostate cancer (H)     Pyelonephritis, acute     Urinary retention     UTI (urinary tract infection)      Past Surgical History:   Procedure Laterality Date    BIOPSY  4.22.2023    CYSTOSCOPY, TRANSURETHRAL RESECTION (TUR) PROSTATE, COMBINED N/A 04/23/2023    Procedure: CYSTOSCOPY, LIMITED TRANSURETHRAL RESECTION OF PROSTATE;  Surgeon: Matthias Cuellar MD;  Location:  OR    CYSTOSCOPY, TRANSURETHRAL RESECTION (TUR) PROSTATE, COMBINED Right 7/11/2023    Procedure: Cystoscopy, transurethral resection (TUR) prostate, combined;  Surgeon: Kasi Dey MD;  Location:  OR    CYSTOSCOPY, URETEROSCOPY, INSERT STENT Right 7/11/2023    Procedure: RIGHT URETERAL STENT EXCHANGE;  Surgeon: Kasi Dey MD;  Location:  OR    IR  NEPHROSTOMY TUBE PLACEMENT RIGHT  04/24/2023    IR URETERAL STENT PLACEMENT RIGHT  04/26/2023    LASER HOLMIUM LITHOTRIPSY URETER(S), INSERT STENT, COMBINED Right 8/22/2023    Procedure: CYSTOSCOPY , RIGHT URTEROSCOPY , RIGHT URETERAL STENT EXCHANGE;  Surgeon: Kasi Dey MD;  Location:  OR     Lab work is in process  Labs reviewed in EPIC  BP Readings from Last 3 Encounters:   04/25/24 (!) 146/66   10/20/23 130/68   08/22/23 (!) 154/75    Wt Readings from Last 3 Encounters:   04/25/24 76.7 kg (169 lb)   10/20/23 73.1 kg (161 lb 1.6 oz)   08/22/23 68.1 kg (150 lb 3.2 oz)                  Patient Active Problem List   Diagnosis    Urinary retention    Pyelonephritis, acute    SHANON (acute kidney injury) (H)    Prostate cancer (H)    Dizziness    Expressive aphasia    UTI (urinary tract infection)    Urinary incontinence, unspecified type     Past Surgical History:   Procedure Laterality Date    BIOPSY  4.22.2023    CYSTOSCOPY, TRANSURETHRAL RESECTION (TUR) PROSTATE, COMBINED N/A 04/23/2023    Procedure: CYSTOSCOPY, LIMITED TRANSURETHRAL RESECTION OF PROSTATE;  Surgeon: Matthias Cuellar MD;  Location:  OR    CYSTOSCOPY, TRANSURETHRAL RESECTION (TUR) PROSTATE, COMBINED Right 7/11/2023    Procedure: Cystoscopy, transurethral resection (TUR) prostate, combined;  Surgeon: Kasi Dey MD;  Location:  OR    CYSTOSCOPY, URETEROSCOPY, INSERT STENT Right 7/11/2023    Procedure: RIGHT URETERAL STENT EXCHANGE;  Surgeon: Kasi Dey MD;  Location:  OR    IR NEPHROSTOMY TUBE PLACEMENT RIGHT  04/24/2023    IR URETERAL STENT PLACEMENT RIGHT  04/26/2023    LASER HOLMIUM LITHOTRIPSY URETER(S), INSERT STENT, COMBINED Right 8/22/2023    Procedure: CYSTOSCOPY , RIGHT URTEROSCOPY , RIGHT URETERAL STENT EXCHANGE;  Surgeon: Kasi Dey MD;  Location:  OR       Social History     Tobacco Use    Smoking status: Never    Smokeless tobacco: Never   Substance Use Topics    Alcohol use: Not  Currently     Family History   Problem Relation Age of Onset    Diabetes Mother     Cerebrovascular Disease Father     Dementia Father     Thyroid Disease Sister     Cerebrovascular Disease Sister     Hypertension Sister     Hyperlipidemia Sister     Diabetes Sister     Thyroid Disease Sister     Coronary Artery Disease Brother     Coronary Artery Disease Maternal Grandfather     Breast Cancer Paternal Grandmother     Coronary Artery Disease Paternal Grandfather     Other Cancer Other         maternal aunt         Current Outpatient Medications   Medication Sig Dispense Refill    Efinaconazole (JUBLIA) 10 % SOLN Externally apply topically daily. To affected nail beds 8 mL 0    enzalutamide (XTANDI) 40 MG capsule Take 80 mg by mouth daily.      Multiple Minerals-Vitamins (PROSTEON) TABS Take 2 capsules by mouth 2 times daily      NONFORMULARY Take 2 capsules by mouth every evening (Intestinal cleanser by Dr. Kelley's)      NONFORMULARY Patient takes a mens herbal supplement daily which includes saw palmetto.      predniSONE (DELTASONE) 5 MG tablet Take 5 mg by mouth 2 times daily       Allergies   Allergen Reactions    Gluten Meal Unknown    Tamsulosin Unknown       Current providers sharing in care for this patient include:  Patient Care Team:  Saurav Ashby PA-C as PCP - General (Physician Assistant - Medical)  Saurav Ashby PA-C as Assigned PCP    The following health maintenance items are reviewed in Epic and correct as of today:  Health Maintenance   Topic Date Due    COVID-19 Vaccine (1) Never done    Pneumococcal Vaccine: 65+ Years (1 of 2 - PCV) Never done    ZOSTER IMMUNIZATION (1 of 2) Never done    DTAP/TDAP/TD IMMUNIZATION (1 - Tdap) Never done    RSV VACCINE (1 - Risk 60-74 years 1-dose series) Never done    INFLUENZA VACCINE (1) Never done    MEDICARE ANNUAL WELLNESS VISIT  10/20/2024    ANNUAL REVIEW OF HM ORDERS  10/03/2025    FALL RISK ASSESSMENT  10/03/2025    GLUCOSE  10/20/2026     "COLORECTAL CANCER SCREENING  11/09/2026    LIPID  06/22/2028    ADVANCE CARE PLANNING  10/03/2029    HEPATITIS C SCREENING  Completed    PHQ-2 (once per calendar year)  Completed    HPV IMMUNIZATION  Aged Out    MENINGITIS IMMUNIZATION  Aged Out    RSV MONOCLONAL ANTIBODY  Aged Out       Appropriate preventive services were discussed with this patient, including applicable screening as appropriate for fall prevention, nutrition, physical activity, Tobacco-use cessation, weight loss and cognition.  Checklist reviewing preventive services available has been given to the patient.          10/3/2024   Mini Cog   Mini-Cog Not Completed (choose reason) Patient declines                     Review of Systems  Constitutional, neuro, ENT, endocrine, pulmonary, cardiac, gastrointestinal, genitourinary, musculoskeletal, integument and psychiatric systems are negative, except as otherwise noted.      Objective    Vitals - Patient Reported  Weight (Patient Reported): 72.6 kg (160 lb)  Height (Patient Reported): 177.8 cm (5' 10\")  BMI (Based on Pt Reported Ht/Wt): 22.96  Pain Score: No Pain (0)      Vitals:  No vitals were obtained today due to virtual visit.    Physical Exam   GENERAL: alert and no distress  EYES: Eyes grossly normal to inspection.  No discharge or erythema, or obvious scleral/conjunctival abnormalities.  RESP: No audible wheeze, cough, or visible cyanosis.    SKIN: Visible skin clear. No significant rash, abnormal pigmentation or lesions.  NEURO: Cranial nerves grossly intact.  Mentation and speech appropriate for age.  PSYCH: Appropriate affect, tone, and pace of words        Video-Visit Details    Type of service:  Video Visit   Video End Time: 3:35  Originating Location (pt. Location): Home    Distant Location (provider location):  On-site  Platform used for Video Visit: Tiffany    The likelihood of other entities in the differential is insufficient to justify any further testing for them at this time. This " was explained to the patient. The patient was advised that persistent or worsening symptoms would require further evaluation. Patient advised to call the office and if unable to reach to go to the emergency room if they develop any new or worsening symptoms. Expressed understanding and agreement with above stated plan.       Signed Electronically by: Saurav Ashby PA-C

## 2024-10-04 ENCOUNTER — PATIENT OUTREACH (OUTPATIENT)
Dept: CARE COORDINATION | Facility: CLINIC | Age: 74
End: 2024-10-04
Payer: MEDICARE

## 2024-10-04 DIAGNOSIS — L60.8 NAIL DISCOLORATION: ICD-10-CM

## 2024-10-04 PROBLEM — C79.51 MALIGNANT NEOPLASM METASTATIC TO BONE (H): Status: ACTIVE | Noted: 2024-10-04

## 2024-10-04 RX ORDER — EFINACONAZOLE 100 MG/ML
SOLUTION TOPICAL DAILY
Qty: 8 ML | Refills: 0 | Status: SHIPPED | OUTPATIENT
Start: 2024-10-04

## 2025-02-11 ENCOUNTER — LAB (OUTPATIENT)
Dept: LAB | Facility: CLINIC | Age: 75
End: 2025-02-11
Payer: MEDICARE

## 2025-02-11 DIAGNOSIS — Z00.00 ENCOUNTER FOR ANNUAL WELLNESS EXAM IN MEDICARE PATIENT: ICD-10-CM

## 2025-02-11 DIAGNOSIS — R03.0 ELEVATED BLOOD PRESSURE READING WITHOUT DIAGNOSIS OF HYPERTENSION: ICD-10-CM

## 2025-02-11 LAB
ALBUMIN SERPL BCG-MCNC: 4.2 G/DL (ref 3.5–5.2)
ALP SERPL-CCNC: 109 U/L (ref 40–150)
ALT SERPL W P-5'-P-CCNC: 21 U/L (ref 0–70)
ANION GAP SERPL CALCULATED.3IONS-SCNC: 13 MMOL/L (ref 7–15)
AST SERPL W P-5'-P-CCNC: 27 U/L (ref 0–45)
BASOPHILS # BLD AUTO: 0 10E3/UL (ref 0–0.2)
BASOPHILS NFR BLD AUTO: 0 %
BILIRUB SERPL-MCNC: 0.5 MG/DL
BUN SERPL-MCNC: 17.9 MG/DL (ref 8–23)
CALCIUM SERPL-MCNC: 10.1 MG/DL (ref 8.8–10.4)
CHLORIDE SERPL-SCNC: 104 MMOL/L (ref 98–107)
CHOLEST SERPL-MCNC: 200 MG/DL
CREAT SERPL-MCNC: 0.9 MG/DL (ref 0.67–1.17)
EGFRCR SERPLBLD CKD-EPI 2021: 90 ML/MIN/1.73M2
EOSINOPHIL # BLD AUTO: 0.1 10E3/UL (ref 0–0.7)
EOSINOPHIL NFR BLD AUTO: 2 %
ERYTHROCYTE [DISTWIDTH] IN BLOOD BY AUTOMATED COUNT: 12.3 % (ref 10–15)
FASTING STATUS PATIENT QL REPORTED: YES
FASTING STATUS PATIENT QL REPORTED: YES
GLUCOSE SERPL-MCNC: 102 MG/DL (ref 70–99)
HCO3 SERPL-SCNC: 25 MMOL/L (ref 22–29)
HCT VFR BLD AUTO: 41 % (ref 40–53)
HDLC SERPL-MCNC: 63 MG/DL
HGB BLD-MCNC: 13.5 G/DL (ref 13.3–17.7)
IMM GRANULOCYTES # BLD: 0 10E3/UL
IMM GRANULOCYTES NFR BLD: 0 %
LDLC SERPL CALC-MCNC: 111 MG/DL
LYMPHOCYTES # BLD AUTO: 3.1 10E3/UL (ref 0.8–5.3)
LYMPHOCYTES NFR BLD AUTO: 44 %
MCH RBC QN AUTO: 29.7 PG (ref 26.5–33)
MCHC RBC AUTO-ENTMCNC: 32.9 G/DL (ref 31.5–36.5)
MCV RBC AUTO: 90 FL (ref 78–100)
MONOCYTES # BLD AUTO: 0.5 10E3/UL (ref 0–1.3)
MONOCYTES NFR BLD AUTO: 8 %
NEUTROPHILS # BLD AUTO: 3.1 10E3/UL (ref 1.6–8.3)
NEUTROPHILS NFR BLD AUTO: 45 %
NONHDLC SERPL-MCNC: 137 MG/DL
PLATELET # BLD AUTO: 280 10E3/UL (ref 150–450)
POTASSIUM SERPL-SCNC: 4.8 MMOL/L (ref 3.4–5.3)
PROT SERPL-MCNC: 6.8 G/DL (ref 6.4–8.3)
RBC # BLD AUTO: 4.54 10E6/UL (ref 4.4–5.9)
SODIUM SERPL-SCNC: 142 MMOL/L (ref 135–145)
TRIGL SERPL-MCNC: 129 MG/DL
WBC # BLD AUTO: 6.9 10E3/UL (ref 4–11)

## 2025-02-11 PROCEDURE — 80053 COMPREHEN METABOLIC PANEL: CPT

## 2025-02-11 PROCEDURE — 36415 COLL VENOUS BLD VENIPUNCTURE: CPT

## 2025-02-11 PROCEDURE — 85025 COMPLETE CBC W/AUTO DIFF WBC: CPT

## 2025-02-11 PROCEDURE — 80061 LIPID PANEL: CPT

## 2025-02-11 NOTE — RESULT ENCOUNTER NOTE
Fuentes Ochoa,     -Stable blood counts. No signs of anemia.     -Cholesterol levels are satisfactory    -Your comprehensive metabolic panel which includes tests of liver function (ALT, AST, total bilirubin, alkaline phosphatase), kidney function (creatinine, BUN), electrolytes (sodium, potassium, calcium), and blood sugar (glucose) returned stable for you.    Let me know if you have any questions or concerns,     Saurav Ashby PA-C  [facility]

## 2025-02-13 ENCOUNTER — HOSPITAL ENCOUNTER (OUTPATIENT)
Dept: CARDIOLOGY | Facility: CLINIC | Age: 75
End: 2025-02-13
Attending: PHYSICIAN ASSISTANT
Payer: MEDICARE

## 2025-02-13 DIAGNOSIS — R06.09 DOE (DYSPNEA ON EXERTION): ICD-10-CM

## 2025-02-13 PROCEDURE — 93325 DOPPLER ECHO COLOR FLOW MAPG: CPT | Mod: TC

## 2025-02-13 PROCEDURE — 93350 STRESS TTE ONLY: CPT | Mod: TC

## 2025-02-17 DIAGNOSIS — R94.39 ABNORMAL STRESS TEST: ICD-10-CM

## 2025-02-17 DIAGNOSIS — R06.09 DOE (DYSPNEA ON EXERTION): Primary | ICD-10-CM

## 2025-02-18 ENCOUNTER — OFFICE VISIT (OUTPATIENT)
Dept: CARDIOLOGY | Facility: CLINIC | Age: 75
End: 2025-02-18
Attending: PHYSICIAN ASSISTANT
Payer: MEDICARE

## 2025-02-18 VITALS
HEIGHT: 70 IN | SYSTOLIC BLOOD PRESSURE: 132 MMHG | OXYGEN SATURATION: 100 % | DIASTOLIC BLOOD PRESSURE: 78 MMHG | WEIGHT: 152.8 LBS | HEART RATE: 71 BPM | BODY MASS INDEX: 21.88 KG/M2

## 2025-02-18 DIAGNOSIS — R94.39 ABNORMAL STRESS TEST: ICD-10-CM

## 2025-02-18 DIAGNOSIS — R06.09 DOE (DYSPNEA ON EXERTION): ICD-10-CM

## 2025-02-18 PROCEDURE — 99204 OFFICE O/P NEW MOD 45 MIN: CPT | Performed by: INTERNAL MEDICINE

## 2025-02-18 PROCEDURE — 93000 ELECTROCARDIOGRAM COMPLETE: CPT | Performed by: INTERNAL MEDICINE

## 2025-02-18 NOTE — LETTER
2/18/2025    TUSHAR Mcgowan-ZAK  [address]  [facility] [address]    RE: Don Venegas       Dear Colleague,     I had the pleasure of seeing Don Venegas in the [facility].    SERVICE DATE: February 18, 2025      Assessment & Plan    Chronic noncardiac symptoms (infrequent, not with activity, present for over 30 years) with submaximal stress test limited by dyspnea suggestive of physical deconditioning.   No cardiac symptoms during regular physical activity. Normal ventricular function, normal cardiac valves, normal stress EKG and no wall motion abnormalities at obtained heart rate of 77% and 7.0 METS.  -Encourage increased physical activity and exercise.  -Reassured patient that I think his symptoms are noncardiac.  Patient is motivated to take up more exercise.  If episodic dyspnea does not improve with exercise, send me a chart message and we will consider a pharmacological nuclear stress test.      Hyperlipidemia  Slight increase in total cholesterol and LDL since 2023, likely due to decreased physical activity.  -Encourage increased physical activity and healthy diet to manage cholesterol levels.    Follow-up  As needed if symptoms worsen or do not improve with increased physical activity.      Tracy Gerardo MD  Cardiology    History of Present Illness  Don Venegas is a 74 year old male who presents with shortness of breath and an abnormal stress test. He was referred by his PA, Saurav Ashby, for evaluation of shortness of breath.    He has experienced occasional chest pain over the last 30 years, described as a 'little bit of a pain' sometimes over the sternum and sometimes slightly to the left. During a recent stress test, he was unable to continue beyond five minutes due to shortness of breath, achieving only 77% of his target heart rate.    He is retired and is trying to be more active. He walks around the [facility] during warmer months and uses a small  trampoline, or rebounder, during colder months. No chest pain or shortness of breath during these activities.    His past medical history is significant for prostate cancer diagnosed three years ago, which has been treated. He is not on any prescription medications but takes over-the-counter herbal supplements. He reports a decline in physical activity over the past two years, partly due to side effects from prostate cancer therapy.    Family history is notable for a grandfather and a brother who both had heart attacks. His brother, who is about a year and a half older, had a heart attack three to four years ago.    No history of tobacco use.    Physical Exam  VITALS: P- 70, BP- 132/78  MEASUREMENTS: BMI- 20.0.  CARDIOVASCULAR: Heart sounds normal, no murmurs, no bruits.    Results  LABS  Total cholesterol: 200 (02/11/2025)  HDL: 63 (02/11/2025)  LDL: 111 (02/11/2025)  Triglycerides: 129 (02/11/2025)  Creatinine: 0.9 (02/11/2025)  GFR: 90 (02/11/2025)  Hb: 13.5 (02/11/2025)    RADIOLOGY  Head CT angiogram: Normal neck arteries, no plaque or blockages (2023)    DIAGNOSTIC  EKG: Normal sinus rhythm, 68 bpm, normal cardiac intervals, no ischemia (02/18/2025)  Stress echocardiogram: Submaximal, 77% target heart rate, normal left ventricular function, normal heart valves, no wall motion abnormalities, normal EKG, max heart rate 113, 7.0 METs (02/16/2025)      New patient.  45 minutes spent by me on the date of the encounter doing chart review, history and exam, documentation and further activities per the note. Moderate complexity medical decision making.      This note was completed in part using dictation via voice recognition software. Some word and grammatical errors may occur and must be interpreted in the appropriate clinical context. If there are any questions pertaining to this issue, please contact me for further clarification.     Orders Placed This Encounter   Procedures     EKG 12-lead complete w/read -  "Clinics (performed today)         Vitals: /78 (BP Location: Right arm, Patient Position: Sitting)   Pulse 71   Ht 1.778 m (5' 10\")   Wt 69.3 kg (152 lb 12.8 oz)   SpO2 100%   BMI 21.92 kg/m        CURRENT MEDICATIONS:  Current Outpatient Medications   Medication Sig Dispense Refill     Multiple Minerals-Vitamins (PROSTEON) TABS Take 2 capsules by mouth 2 times daily       NONFORMULARY Take 2 capsules by mouth every evening (Intestinal cleanser by Dr. Kelley's)       NONFORMULARY Patient takes a mens herbal supplement daily which includes saw palmetto.       JUBLIA 10 % SOLN EXTERNALLY APPLY TOPICALLY DAILY TO AFFECTED NAIL BEDS (Patient not taking: Reported on 2/18/2025) 8 mL 0         ALLERGIES:  Allergies   Allergen Reactions     Enzalutamide Dizziness and Swelling     Gluten Meal Unknown     Tamsulosin Unknown         Thank you for allowing me to participate in the care of your patient.      Sincerely,     Tracy Gerardo MD     [facility]  cc:   Saurav Ashby PA-C  [address] [address]      "

## 2025-06-30 ENCOUNTER — TELEPHONE (OUTPATIENT)
Dept: FAMILY MEDICINE | Facility: CLINIC | Age: 75
End: 2025-06-30
Payer: MEDICARE

## 2025-06-30 DIAGNOSIS — L60.8 NAIL DISCOLORATION: Primary | ICD-10-CM

## 2025-06-30 RX ORDER — CICLOPIROX 80 MG/ML
SOLUTION TOPICAL
Qty: 6.6 ML | Refills: 11 | Status: SHIPPED | OUTPATIENT
Start: 2025-06-30

## 2025-06-30 NOTE — TELEPHONE ENCOUNTER
Outpatient Medication Detail     Disp Refills Start End NADEGE   JUBLIA 10 % SOLN 8 mL 0 10/4/2024 -- No   Sig - Route: EXTERNALLY APPLY TOPICALLY DAILY TO AFFECTED NAIL BEDS - Apply externally   Patient not taking: Reported on 2/18/2025   Sent to pharmacy as: Jublia 10 % External Solution (Efinaconazole)   Class: E-Prescribe   Order: [id number]   E-Prescribing Status: Receipt confirmed by pharmacy (10/4/2024 12:41 PM CDT)     Associated Diagnoses    Nail discoloration [L60.8]            Fax from pharmacy - medication is over $1600 with insurance    Please send alternative     RT Brianna (R)

## (undated) DEVICE — SYR ELLIK EVACUATOR W/ADAPT LATEX

## (undated) DEVICE — PAD CHUX UNDERPAD 23X24" 7136

## (undated) DEVICE — CATH URETERAL FLEX TIP TIGERTAIL 06FRX70CM 139006

## (undated) DEVICE — SOL NACL 0.9% IRRIG 1000ML BOTTLE 2F7124

## (undated) DEVICE — SOL NACL 0.9% IRRIG 3000ML BAG 2B7477

## (undated) DEVICE — PACK CYSTOSCOPY SBA15CYFSI

## (undated) DEVICE — DRAPE GYN/UROLOGY FLUID POUCH TUR 29455

## (undated) DEVICE — GLOVE BIOGEL PI MICRO SZ 7.5 48575

## (undated) DEVICE — GUIDEWIRE SENSOR DUAL FLEX STR 0.035"X150CM M0066703080

## (undated) DEVICE — CATH TRAY FOLEY SURESTEP 16FR W/URNE MTR STLK LATEX A303316A

## (undated) DEVICE — ESU ELEC CUTTING LOOP 24/26FR .35MM BIPOLAR 27040GP1-S

## (undated) DEVICE — LINEN TOWEL PACK X5 5464

## (undated) DEVICE — BAG DRAIN URO FOR SIEMENS 8MM ADAPTER NS CC164NS-A

## (undated) DEVICE — GUIDEWIRE ZIPWIRE ANG .035"X150CM M006630206B0

## (undated) DEVICE — DECANTER BAG 2002S

## (undated) DEVICE — EVACUATOR BLADDER UROVAC LATEX M0067301250

## (undated) DEVICE — RAD RX ISOVUE 300 (50ML) 61% IOPAMIDOL CHARGE PER ML

## (undated) DEVICE — PACK TUR CUSTOM SBA15RUFSE

## (undated) DEVICE — BAG URINARY DRAIN 4000ML LF 153509

## (undated) DEVICE — CATH FOLEY 3WAY 22FR 30ML LATEX 0167SI22

## (undated) DEVICE — CATH URETERAL OPEN END 6FR AXXCESS

## (undated) DEVICE — ENDO ADAPTER CHECK-FLO DISP 27550-CKU

## (undated) DEVICE — SOL WATER IRRIG 1000ML BOTTLE 2F7114

## (undated) RX ORDER — LIDOCAINE HYDROCHLORIDE 10 MG/ML
INJECTION, SOLUTION INFILTRATION; PERINEURAL
Status: DISPENSED
Start: 2023-04-26

## (undated) RX ORDER — ONDANSETRON 2 MG/ML
INJECTION INTRAMUSCULAR; INTRAVENOUS
Status: DISPENSED
Start: 2023-07-11

## (undated) RX ORDER — FENTANYL CITRATE 50 UG/ML
INJECTION, SOLUTION INTRAMUSCULAR; INTRAVENOUS
Status: DISPENSED
Start: 2023-08-22

## (undated) RX ORDER — CIPROFLOXACIN 2 MG/ML
INJECTION, SOLUTION INTRAVENOUS
Status: DISPENSED
Start: 2023-07-11

## (undated) RX ORDER — LIDOCAINE HYDROCHLORIDE 10 MG/ML
INJECTION, SOLUTION INFILTRATION; PERINEURAL
Status: DISPENSED
Start: 2023-04-24

## (undated) RX ORDER — DEXAMETHASONE SODIUM PHOSPHATE 4 MG/ML
INJECTION, SOLUTION INTRA-ARTICULAR; INTRALESIONAL; INTRAMUSCULAR; INTRAVENOUS; SOFT TISSUE
Status: DISPENSED
Start: 2023-07-11

## (undated) RX ORDER — CIPROFLOXACIN 2 MG/ML
INJECTION, SOLUTION INTRAVENOUS
Status: DISPENSED
Start: 2023-08-22

## (undated) RX ORDER — HYDROMORPHONE HYDROCHLORIDE 1 MG/ML
INJECTION, SOLUTION INTRAMUSCULAR; INTRAVENOUS; SUBCUTANEOUS
Status: DISPENSED
Start: 2023-04-23

## (undated) RX ORDER — PROPOFOL 10 MG/ML
INJECTION, EMULSION INTRAVENOUS
Status: DISPENSED
Start: 2023-07-11

## (undated) RX ORDER — PROPOFOL 10 MG/ML
INJECTION, EMULSION INTRAVENOUS
Status: DISPENSED
Start: 2023-08-22

## (undated) RX ORDER — DEXAMETHASONE SODIUM PHOSPHATE 4 MG/ML
INJECTION, SOLUTION INTRA-ARTICULAR; INTRALESIONAL; INTRAMUSCULAR; INTRAVENOUS; SOFT TISSUE
Status: DISPENSED
Start: 2023-04-23

## (undated) RX ORDER — EPHEDRINE SULFATE 50 MG/ML
INJECTION, SOLUTION INTRAMUSCULAR; INTRAVENOUS; SUBCUTANEOUS
Status: DISPENSED
Start: 2023-08-22

## (undated) RX ORDER — KETOROLAC TROMETHAMINE 30 MG/ML
INJECTION, SOLUTION INTRAMUSCULAR; INTRAVENOUS
Status: DISPENSED
Start: 2023-07-11

## (undated) RX ORDER — ONDANSETRON 2 MG/ML
INJECTION INTRAMUSCULAR; INTRAVENOUS
Status: DISPENSED
Start: 2023-08-22

## (undated) RX ORDER — EPHEDRINE SULFATE 50 MG/ML
INJECTION, SOLUTION INTRAMUSCULAR; INTRAVENOUS; SUBCUTANEOUS
Status: DISPENSED
Start: 2023-07-11

## (undated) RX ORDER — EPHEDRINE SULFATE 50 MG/ML
INJECTION, SOLUTION INTRAMUSCULAR; INTRAVENOUS; SUBCUTANEOUS
Status: DISPENSED
Start: 2023-04-23

## (undated) RX ORDER — LIDOCAINE HYDROCHLORIDE 20 MG/ML
JELLY TOPICAL
Status: DISPENSED
Start: 2023-04-26

## (undated) RX ORDER — FENTANYL CITRATE 50 UG/ML
INJECTION, SOLUTION INTRAMUSCULAR; INTRAVENOUS
Status: DISPENSED
Start: 2023-04-26

## (undated) RX ORDER — ONDANSETRON 2 MG/ML
INJECTION INTRAMUSCULAR; INTRAVENOUS
Status: DISPENSED
Start: 2023-04-23

## (undated) RX ORDER — FENTANYL CITRATE 50 UG/ML
INJECTION, SOLUTION INTRAMUSCULAR; INTRAVENOUS
Status: DISPENSED
Start: 2023-07-11

## (undated) RX ORDER — FENTANYL CITRATE 50 UG/ML
INJECTION, SOLUTION INTRAMUSCULAR; INTRAVENOUS
Status: DISPENSED
Start: 2023-04-23

## (undated) RX ORDER — FENTANYL CITRATE 50 UG/ML
INJECTION, SOLUTION INTRAMUSCULAR; INTRAVENOUS
Status: DISPENSED
Start: 2023-04-24

## (undated) RX ORDER — ACETAMINOPHEN 325 MG/1
TABLET ORAL
Status: DISPENSED
Start: 2023-07-11